# Patient Record
Sex: MALE | Race: WHITE | NOT HISPANIC OR LATINO | Employment: OTHER | URBAN - METROPOLITAN AREA
[De-identification: names, ages, dates, MRNs, and addresses within clinical notes are randomized per-mention and may not be internally consistent; named-entity substitution may affect disease eponyms.]

---

## 2021-11-11 ENCOUNTER — TELEPHONE (OUTPATIENT)
Dept: GERIATRICS | Facility: CLINIC | Age: 76
End: 2021-11-11
Payer: COMMERCIAL

## 2021-11-17 ENCOUNTER — ASSISTED LIVING VISIT (OUTPATIENT)
Dept: GERIATRICS | Facility: CLINIC | Age: 76
End: 2021-11-17
Payer: MEDICARE

## 2021-11-17 VITALS
DIASTOLIC BLOOD PRESSURE: 78 MMHG | BODY MASS INDEX: 28.89 KG/M2 | WEIGHT: 190 LBS | HEART RATE: 77 BPM | SYSTOLIC BLOOD PRESSURE: 121 MMHG | OXYGEN SATURATION: 95 % | TEMPERATURE: 97.8 F | RESPIRATION RATE: 18 BRPM

## 2021-11-17 DIAGNOSIS — F32.A DEPRESSION, UNSPECIFIED DEPRESSION TYPE: ICD-10-CM

## 2021-11-17 DIAGNOSIS — M54.2 CERVICALGIA: ICD-10-CM

## 2021-11-17 DIAGNOSIS — I60.9 SAH (SUBARACHNOID HEMORRHAGE) (H): ICD-10-CM

## 2021-11-17 DIAGNOSIS — Z71.89 ACP (ADVANCE CARE PLANNING): ICD-10-CM

## 2021-11-17 DIAGNOSIS — R29.6 RECURRENT FALLS: Primary | ICD-10-CM

## 2021-11-17 DIAGNOSIS — N40.0 BENIGN PROSTATIC HYPERPLASIA WITHOUT LOWER URINARY TRACT SYMPTOMS: ICD-10-CM

## 2021-11-17 RX ORDER — LATANOPROST 50 UG/ML
1 SOLUTION/ DROPS OPHTHALMIC AT BEDTIME
Status: ON HOLD | COMMUNITY

## 2021-11-17 RX ORDER — BRIMONIDINE TARTRATE AND TIMOLOL MALEATE 2; 5 MG/ML; MG/ML
1 SOLUTION OPHTHALMIC 2 TIMES DAILY
Status: ON HOLD | COMMUNITY
End: 2024-10-02

## 2021-11-17 RX ORDER — POLYETHYLENE GLYCOL 3350 17 G/17G
1 POWDER, FOR SOLUTION ORAL DAILY PRN
Status: ON HOLD | COMMUNITY

## 2021-11-17 RX ORDER — BRIMONIDINE TARTRATE 2 MG/ML
1 SOLUTION/ DROPS OPHTHALMIC 2 TIMES DAILY
Status: ON HOLD | COMMUNITY
End: 2024-10-02

## 2021-11-17 RX ORDER — DULOXETIN HYDROCHLORIDE 30 MG/1
30 CAPSULE, DELAYED RELEASE ORAL 2 TIMES DAILY
Status: ON HOLD | COMMUNITY

## 2021-11-17 RX ORDER — MORPHINE SULFATE 15 MG/1
15 TABLET ORAL 3 TIMES DAILY PRN
COMMUNITY
End: 2022-01-10

## 2021-11-17 RX ORDER — ACETAMINOPHEN 500 MG
500 TABLET ORAL
Status: ON HOLD | COMMUNITY

## 2021-11-17 NOTE — LETTER
2021        RE: Franco Bhakta  [address] [facility] [address]        [place] [facility]  PRIMARY CARE PROVIDER AND CLINIC:  Blaise Rodriguez, MEMO CNP, [address] [facility] [address] / [facility] [address]  Chief Complaint   Patient presents with     Establish Care     [place] Medical Record Number:  [id number]  Place of Service where encounter took place:  [facility] ([facility]) [233]    Franco Bhakta  is a 75 year old  (1945), admitted to the above facility from [facility]..  Admitted to this facility for  rehab, medical management and nursing care.    HPI:    HPI information obtained from: facility chart records, facility staff, patient report, [facility] chart review and family/first contact dtr report.   Brief Summary of Hospital Course:     Falls: h/o falls. Living indep in apt. After spouse , became isolative, started to have increase etoh use per dtr.  Has recurrent falls. ED visit 21 after fall, head trauma. Per CT had small SAH. Thought fall r/t mechanical fall vs syncope.  Has 1st deg. AV block.    SAH: s/p fall as above. Neuro consult.  Asa dc'd.  Started on keppra, depakote for seizure prophylaxis. dc'd to TCU.  21 had alt. Mental status. Head CT stable.  Thought to have metabolic encephalopathy.  Keppra, depakote dc'd.  Has memory loss, confusion at times.    Cervicalgia: s/p cervical fusion, chronic LUE pain, decreased sensation.  Had been followed by pain clinic per dtr. Neurontin, lyrica ineffective. Currently taking cymbalta, prn MS.    BPH: cont on flomax. No recent dysuria    Depression: anxiety at times.  Unhappy with AL placement-wants to live independently.  Has cog. Impairment per testing at TCU. At AL, ACT score 4.4, MMSE 25/30.  Cont. On cymbalta as above.      DMII: cont. On latus.  BG levels variable 100s-200s, > 300 x1 since admit.  Po intake stable. No recent hgba1c      Updates on  Status Since Skilled nursing Admission: po intake stable.  Gait steady with walker    CODE STATUS/ADVANCE DIRECTIVES DISCUSSION:   DNR/DNI code   Patient's living condition: lives in an assisted living facility  ALLERGIES: Atorvastatin, Lisinopril, Oxycodone, and Sertraline  PAST MEDICAL HISTORY:  has a past medical history of Chronic kidney disease, Diabetes (H), Hyperlipidemia, and Hypertension.  PAST SURGICAL HISTORY:   has a past surgical history that includes joint replacement.  FAMILY HISTORY: family history is not on file.  SOCIAL HISTORY:   reports that he has quit smoking. He does not have any smokeless tobacco history on file.    Post Discharge Medication Reconciliation Status: discharge medications reconciled and changed, per note/orders    Current Outpatient Medications   Medication Sig Dispense Refill     acetaminophen (TYLENOL) 500 MG tablet Take 500 mg by mouth 3 times daily       brimonidine (ALPHAGAN) 0.2 % ophthalmic solution Place 1 drop into the right eye 2 times daily       brimonidine-timolol (COMBIGAN) 0.2-0.5 % ophthalmic solution Place 1 drop into the right eye 2 times daily       DULoxetine (CYMBALTA) 30 MG capsule Take 30 mg by mouth 2 times daily       insulin glargine (LANTUS) 100 UNIT/ML PEN Inject 4 Units Subcutaneous At Bedtime        latanoprost (XALATAN) 0.005 % ophthalmic solution Place 1 drop into the right eye At Bedtime       morphine (MSIR) 15 MG IR tablet Take 15 mg by mouth 3 times daily as needed for severe pain       polyethylene glycol (MIRALAX) 17 g packet Take 1 packet by mouth daily as needed for constipation       tamsulosin (FLOMAX) 0.4 MG 24 hr capsule Take 0.4 mg by mouth every morning           ROS:  No chest pain, shortness of breath, fevers, chills, headache, nausea, vomiting, dysuria or bowel abnormalities.  Appetite is  normal.  No pain except LUE.    Vitals:  /78   Pulse 77   Temp 97.8  F (36.6  C)   Resp 18   Wt 86.2 kg (190 lb)   SpO2 95%   BMI  28.89 kg/m    Exam:  GENERAL APPEARANCE:  Alert, in no distress, appears healthy, cooperative  ENT:  Mouth and posterior oropharynx normal, moist mucous membranes, [place], no rhinitis  EYES:  EOM, conjunctivae, lids, pupils and irises normal, PERRL, no drainage  NECK:  No adenopathy,masses or thyromegaly, no carotid bruit, FROM  RESP:  respiratory effort and palpation of chest normal, lungs clear to auscultation , no respiratory distress  CV:  Palpation and auscultation of heart done , regular rate and rhythm, no murmur, rub, or gallop, no edema  ABDOMEN:  normal bowel sounds, soft, nontender, no hepatosplenomegaly or other masses, no guarding or rebound, no bruits  M/S:   gait steady with walker.  muscle strength 5/5 all 4 ext. good ROM shoulders  NEURO:   Cranial nerves 2-12 are normal tested and grossly at patient's baseline, no tremor, speech clear  PSYCH:  insight and judgement impaired, memory impaired , affect and mood normal    Lab/Diagnostic data:  Recent labs in [facility] reviewed by me today.     ASSESSMENT/PLAN:  (R29.6) Recurrent falls  (primary encounter diagnosis)  Comment: s/p fall with SAH. Gait currently stable with walker  Plan: 1. Therapies to start later this week  2. Walker at all times  3. Monitor for changes in gait, LE weakness    (I60.9) SAH (subarachnoid hemorrhage) (H)  Comment: s/p fall. F/u CT showed stable small SAH. Asa discontinued. Alt. Mental status with repeat hosp. Stay.  Metabolic encephalopathy.  Keppra, depakote dc'd    Plan: 1. Cont. Secured memory care unit  2. SW to do further cog. Testing  3. Monitor for changes in neuros    (M54.2) Cervicalgia  Comment: ongoing LUE pain  Plan: 1. Cont. Tylenol  2. Cont. Prn MS  3. dtr does not want further f/u at pain clinic    (N40.0) Benign prostatic hyperplasia without lower urinary tract symptoms  Comment: gen. Stable.  Plan: 1. Cont. flomax  2. Monitor for dysuria, s/s urinary retention    (F32.A) Depression, unspecified depression  type  Comment: mood gen. Stable. occ increased anxiety. Unhappy with AL placement  Plan: 1. Cont. cymbalta  2. Monitor for exit-seeking behaviors  3. Monitor for insomnia    (E11.65,  Z79.4) Insulin dependent type 2 diabetes mellitus, uncontrolled (H)  Comment: variable BG levels  Plan: 1. Cont. basaglar  2. Cont. Bid BG levels  3. Reassess BG levels over next week  4. hgba1c in next 1-2 mos    (Z71.89) ACP (advance care planning)  Comment: DNR/DNI. Comfort focus  Plan: POLST in chart      Electronically signed by:  MEMO Atwood CNP                           Sincerely,        MEMO Atwood CNP

## 2021-11-17 NOTE — PROGRESS NOTES
[place] [facility]  PRIMARY CARE PROVIDER AND CLINIC:  Blaise Rodriguez, APRN CNP, [address] / [place] [address]  Chief Complaint   Patient presents with     Establish Care     [place] Medical Record Number:  [id number]  Place of Service where encounter took place:  [facility] ([facility]) [233]    Franco Bhakta  is a 75 year old  (1945), admitted to the above facility from [facility]..  Admitted to this facility for  rehab, medical management and nursing care.    HPI:    HPI information obtained from: facility chart records, facility staff, patient report, [place] Epic chart review and family/first contact dtr report.   Brief Summary of Hospital Course:     Falls: h/o falls. Living indep in apt. After spouse , became isolative, started to have increase etoh use per dtr.  Has recurrent falls. ED visit 21 after fall, head trauma. Per CT had small SAH. Thought fall r/t mechanical fall vs syncope.  Has 1st deg. AV block.    SAH: s/p fall as above. Neuro consult.  Asa dc'd.  Started on keppra, depakote for seizure prophylaxis. dc'd to TCU.  21 had alt. Mental status. Head CT stable.  Thought to have metabolic encephalopathy.  Keppra, depakote dc'd.  Has memory loss, confusion at times.    Cervicalgia: s/p cervical fusion, chronic LUE pain, decreased sensation.  Had been followed by pain clinic per dtr. Neurontin, lyrica ineffective. Currently taking cymbalta, prn MS.    BPH: cont on flomax. No recent dysuria    Depression: anxiety at times.  Unhappy with AL placement-wants to live independently.  Has cog. Impairment per testing at TCU. At AL, ACT score 4.4, MMSE 25/30.  Cont. On cymbalta as above.      DMII: cont. On latus.  BG levels variable 100s-200s, > 300 x1 since admit.  Po intake stable. No recent hgba1c      Updates on Status Since Skilled nursing Admission: po intake stable.  Gait steady with walker    CODE STATUS/ADVANCE  DIRECTIVES DISCUSSION:   DNR/DNI code   Patient's living condition: lives in an assisted living facility  ALLERGIES: Atorvastatin, Lisinopril, Oxycodone, and Sertraline  PAST MEDICAL HISTORY:  has a past medical history of Chronic kidney disease, Diabetes (H), Hyperlipidemia, and Hypertension.  PAST SURGICAL HISTORY:   has a past surgical history that includes joint replacement.  FAMILY HISTORY: family history is not on file.  SOCIAL HISTORY:   reports that he has quit smoking. He does not have any smokeless tobacco history on file.    Post Discharge Medication Reconciliation Status: discharge medications reconciled and changed, per note/orders    Current Outpatient Medications   Medication Sig Dispense Refill     acetaminophen (TYLENOL) 500 MG tablet Take 500 mg by mouth 3 times daily       brimonidine (ALPHAGAN) 0.2 % ophthalmic solution Place 1 drop into the right eye 2 times daily       brimonidine-timolol (COMBIGAN) 0.2-0.5 % ophthalmic solution Place 1 drop into the right eye 2 times daily       DULoxetine (CYMBALTA) 30 MG capsule Take 30 mg by mouth 2 times daily       insulin glargine (LANTUS) 100 UNIT/ML PEN Inject 4 Units Subcutaneous At Bedtime        latanoprost (XALATAN) 0.005 % ophthalmic solution Place 1 drop into the right eye At Bedtime       morphine (MSIR) 15 MG IR tablet Take 15 mg by mouth 3 times daily as needed for severe pain       polyethylene glycol (MIRALAX) 17 g packet Take 1 packet by mouth daily as needed for constipation       tamsulosin (FLOMAX) 0.4 MG 24 hr capsule Take 0.4 mg by mouth every morning           ROS:  No chest pain, shortness of breath, fevers, chills, headache, nausea, vomiting, dysuria or bowel abnormalities.  Appetite is  normal.  No pain except LUE.    Vitals:  /78   Pulse 77   Temp 97.8  F (36.6  C)   Resp 18   Wt 86.2 kg (190 lb)   SpO2 95%   BMI 28.89 kg/m    Exam:  GENERAL APPEARANCE:  Alert, in no distress, appears healthy, cooperative  ENT:  Mouth  and posterior oropharynx normal, moist mucous membranes, [place], no rhinitis  EYES:  EOM, conjunctivae, lids, pupils and irises normal, PERRL, no drainage  NECK:  No adenopathy,masses or thyromegaly, no carotid bruit, FROM  RESP:  respiratory effort and palpation of chest normal, lungs clear to auscultation , no respiratory distress  CV:  Palpation and auscultation of heart done , regular rate and rhythm, no murmur, rub, or gallop, no edema  ABDOMEN:  normal bowel sounds, soft, nontender, no hepatosplenomegaly or other masses, no guarding or rebound, no bruits  M/S:   gait steady with walker.  muscle strength 5/5 all 4 ext. good ROM shoulders  NEURO:   Cranial nerves 2-12 are normal tested and grossly at patient's baseline, no tremor, speech clear  PSYCH:  insight and judgement impaired, memory impaired , affect and mood normal    Lab/Diagnostic data:  Recent labs in [facility] reviewed by me today.     ASSESSMENT/PLAN:  (R29.6) Recurrent falls  (primary encounter diagnosis)  Comment: s/p fall with SAH. Gait currently stable with walker  Plan: 1. Therapies to start later this week  2. Walker at all times  3. Monitor for changes in gait, LE weakness    (I60.9) SAH (subarachnoid hemorrhage) (H)  Comment: s/p fall. F/u CT showed stable small SAH. Asa discontinued. Alt. Mental status with repeat hosp. Stay.  Metabolic encephalopathy.  Keppra, depakote dc'[place]    Plan: 1. Cont. Secured memory care unit  2. SW to do further cog. Testing  3. Monitor for changes in neuros    (M54.2) Cervicalgia  Comment: ongoing LUE pain  Plan: 1. Cont. Tylenol  2. Cont. Prn MS  3. dtr does not want further f/u at pain clinic    (N40.0) Benign prostatic hyperplasia without lower urinary tract symptoms  Comment: gen. Stable.  Plan: 1. Cont. flomax  2. Monitor for dysuria, s/s urinary retention    (F32.A) Depression, unspecified depression type  Comment: mood gen. Stable. occ increased anxiety. Unhappy with AL placement  Plan: 1. Cont. cymbalta  2.  Monitor for exit-seeking behaviors  3. Monitor for insomnia    (E11.65,  Z79.4) Insulin dependent type 2 diabetes mellitus, uncontrolled (H)  Comment: variable BG levels  Plan: 1. Cont. basaglar  2. Cont. Bid BG levels  3. Reassess BG levels over next week  4. hgba1c in next 1-2 mos    (Z71.89) ACP (advance care planning)  Comment: DNR/DNI. Comfort focus  Plan: POLST in chart      Electronically signed by:  MEMO Atwood CNP

## 2021-11-24 ENCOUNTER — ASSISTED LIVING VISIT (OUTPATIENT)
Dept: GERIATRICS | Facility: CLINIC | Age: 76
End: 2021-11-24
Payer: MEDICARE

## 2021-11-24 VITALS
SYSTOLIC BLOOD PRESSURE: 137 MMHG | OXYGEN SATURATION: 93 % | HEART RATE: 59 BPM | DIASTOLIC BLOOD PRESSURE: 79 MMHG | RESPIRATION RATE: 18 BRPM

## 2021-11-24 DIAGNOSIS — F32.A DEPRESSION, UNSPECIFIED DEPRESSION TYPE: ICD-10-CM

## 2021-11-24 DIAGNOSIS — M79.622 PAIN OF LEFT UPPER ARM: ICD-10-CM

## 2021-11-24 NOTE — PROGRESS NOTES
[place] [facility]  [place] Medical Record Number:  [id number]  [facility] [233[facility]  Chief Complaint   Patient presents with     Diabetes     Depression       HPI:    Franco Bhakta  is a 76 year old (1945), who is being seen today for an episodic care visit.  HPI information obtained from: facility chart records, facility staff, patient report, [facility] chart review and family/first contact dtr report. Today's concern is: DMII, depression, L UE pain. For DMII cont. On lantus.  BG levels variable 100s-upper 200s with few >300.  S/p cervical spine fusion, chronic LUE pain.  Cont. On tylenol, prn MS. Reports pain has been gen stable past couple days. Gait steady with walker.  Had HC RN visit-depression screen, mod. To severe depression.  Reported suicidal ideation, no plans to harm self.  Per family reports, has had ongoing depression since loss of spouse.  Suicidal ideation not new.  Cont. On cymbalta. Po intake stable. Mood gen. Stable per staff.       Past Medical and Surgical History reviewed in Epic today.    MEDICATIONS:    Current Outpatient Medications   Medication Sig Dispense Refill     insulin glargine (LANTUS PEN) 100 UNIT/ML pen Inject 2 Units Subcutaneous every morning And 4 units q pm       acetaminophen (TYLENOL) 500 MG tablet Take 500 mg by mouth 3 times daily       brimonidine (ALPHAGAN) 0.2 % ophthalmic solution Place 1 drop into the right eye 2 times daily       brimonidine-timolol (COMBIGAN) 0.2-0.5 % ophthalmic solution Place 1 drop into the right eye 2 times daily       DULoxetine (CYMBALTA) 30 MG capsule Take 30 mg by mouth 2 times daily       latanoprost (XALATAN) 0.005 % ophthalmic solution Place 1 drop into the right eye At Bedtime       morphine (MSIR) 15 MG IR tablet Take 15 mg by mouth 3 times daily as needed for severe pain       polyethylene glycol (MIRALAX) 17 g packet Take 1 packet by mouth daily  as needed for constipation       tamsulosin (FLOMAX) 0.4 MG 24 hr capsule Take 0.4 mg by mouth every morning           REVIEW OF SYSTEMS:  CONSTITUTIONAL:  body aches, EYES:  mac deg, ENT:  neg, CV:  neg, RESPIRATORY: neg, :  Nocturia, GI:  neg, NEURO:  numbness or tingling , PSYCH: reports occ depression, no current suicial ideation, denies plan to harm self and MUSCULOSKELETAL: muscle aches, soreness    Objective:  /79   Pulse 59   Resp 18   SpO2 93%   Exam:  GENERAL APPEARANCE:  Alert, in no distress, appears healthy  ENT:  Mouth and posterior oropharynx normal, moist mucous membranes, [place]  EYES:  EOM, conjunctivae, lids, pupils and irises normal, PERRL  RESP:  respiratory effort and palpation of chest normal, lungs clear to auscultation , no respiratory distress  CV:  Palpation and auscultation of heart done , regular rate and rhythm, no murmur, rub, or gallop, no edema  ABDOMEN:  normal bowel sounds, soft, nontender, no hepatosplenomegaly or other masses, no guarding or rebound  M/S:   Gait and station normal  muscle strength 5/5 all 4 ext.  NEURO:   Cranial nerves 2-12 are normal tested and grossly at patient's baseline, no tremor  PSYCH:  affect abnormal -flat, mood appears stable.  no apparent anxiety    Labs:     Most Recent 3 CBC's:Recent Labs   Lab Test 09/27/16  1705 09/26/16  1402   WBC 7.5 7.4   HGB 16.0 16.5   MCV 92 93    151     Most Recent 3 BMP's:Recent Labs   Lab Test 09/27/16  1705 09/26/16  1402    138   POTASSIUM 4.3 4.2   CHLORIDE 103 105   CO2 27 27   BUN 22 20   CR 1.09 0.93   ANIONGAP 8 6   VIVIAN 8.8 9.0   * 167*       ASSESSMENT/PLAN:  (E11.65,  Z79.4) Insulin dependent type 2 diabetes mellitus, uncontrolled (H)  (primary encounter diagnosis)  Comment: variable BG levels, elevated at times  Plan: 1. Increase lantus to 4 units qpm, 2 units qam  2. Cont. Tid BG levels  3. hgba1c next week  4. Reassess BG levels over next few days    (F32.A) Depression,  unspecified depression type  Comment: affect flat today. Denies plan to harm self.   Plan: 1. Cont. cymbalta  2. To see SW  3. Left vm with dtr to discuss depression further, cont. Comfort care focus  4. Cont. To invite to activities throughout the day  5. Monitor for changes in mood    (M79.622) Pain of left upper arm  Comment: gen. stable  Plan: 1. Cont. Tylenol  2. Cont. Prn MS  3. Cont. cymbalta as above  4. Monitor for decreased ROM LUE, difficulty with walker    Electronically signed by:  MEMO Atwood CNP

## 2021-11-24 NOTE — LETTER
11/24/2021        RE: Franco Bhakta  [address] [facility] [address]        [place] [facility]  [place] Medical Record Number:  [id number]  [facility] [233[facility]  Chief Complaint   Patient presents with     Diabetes     Depression       HPI:    Franco Bhakta  is a 76 year old (1945), who is being seen today for an episodic care visit.  HPI information obtained from: facility chart records, facility staff, patient report, [facility] chart review and family/first contact dtr report. Today's concern is: DMII, depression, L UE pain. For DMII cont. On lantus.  BG levels variable 100s-upper 200s with few >300.  S/p cervical spine fusion, chronic LUE pain.  Cont. On tylenol, prn MS. Reports pain has been gen stable past couple days. Gait steady with walker.  Had HC RN visit-depression screen, mod. To severe depression.  Reported suicidal ideation, no plans to harm self.  Per family reports, has had ongoing depression since loss of spouse.  Suicidal ideation not new.  Cont. On cymbalta. Po intake stable. Mood gen. Stable per staff.       Past Medical and Surgical History reviewed in Epic today.    MEDICATIONS:    Current Outpatient Medications   Medication Sig Dispense Refill     insulin glargine (LANTUS PEN) 100 UNIT/ML pen Inject 2 Units Subcutaneous every morning And 4 units q pm       acetaminophen (TYLENOL) 500 MG tablet Take 500 mg by mouth 3 times daily       brimonidine (ALPHAGAN) 0.2 % ophthalmic solution Place 1 drop into the right eye 2 times daily       brimonidine-timolol (COMBIGAN) 0.2-0.5 % ophthalmic solution Place 1 drop into the right eye 2 times daily       DULoxetine (CYMBALTA) 30 MG capsule Take 30 mg by mouth 2 times daily       latanoprost (XALATAN) 0.005 % ophthalmic solution Place 1 drop into the right eye At Bedtime       morphine (MSIR) 15 MG IR tablet Take 15 mg by mouth 3 times daily  as needed for severe pain       polyethylene glycol (MIRALAX) 17 g packet Take 1 packet by mouth daily as needed for constipation       tamsulosin (FLOMAX) 0.4 MG 24 hr capsule Take 0.4 mg by mouth every morning           REVIEW OF SYSTEMS:  CONSTITUTIONAL:  body aches, EYES:  mac deg, ENT:  neg, CV:  neg, RESPIRATORY: neg, :  Nocturia, GI:  neg, NEURO:  numbness or tingling , PSYCH: reports occ depression, no current suicial ideation, denies plan to harm self and MUSCULOSKELETAL: muscle aches, soreness    Objective:  /79   Pulse 59   Resp 18   SpO2 93%   Exam:  GENERAL APPEARANCE:  Alert, in no distress, appears healthy  ENT:  Mouth and posterior oropharynx normal, moist mucous membranes, [place]  EYES:  EOM, conjunctivae, lids, pupils and irises normal, PERRL  RESP:  respiratory effort and palpation of chest normal, lungs clear to auscultation , no respiratory distress  CV:  Palpation and auscultation of heart done , regular rate and rhythm, no murmur, rub, or gallop, no edema  ABDOMEN:  normal bowel sounds, soft, nontender, no hepatosplenomegaly or other masses, no guarding or rebound  M/S:   Gait and station normal  muscle strength 5/5 all 4 ext.  NEURO:   Cranial nerves 2-12 are normal tested and grossly at patient's baseline, no tremor  PSYCH:  affect abnormal -flat, mood appears stable.  no apparent anxiety    Labs:     Most Recent 3 CBC's:Recent Labs   Lab Test 09/27/16  1705 09/26/16  1402   WBC 7.5 7.4   HGB 16.0 16.5   MCV 92 93    151     Most Recent 3 BMP's:Recent Labs   Lab Test 09/27/16  1705 09/26/16  1402    138   POTASSIUM 4.3 4.2   CHLORIDE 103 105   CO2 27 27   BUN 22 20   CR 1.09 0.93   ANIONGAP 8 6   VIVIAN 8.8 9.0   * 167*       ASSESSMENT/PLAN:  (E11.65,  Z79.4) Insulin dependent type 2 diabetes mellitus, uncontrolled (H)  (primary encounter diagnosis)  Comment: variable BG levels, elevated at times  Plan: 1. Increase lantus to 4 units qpm, 2 units qam  2. Cont. Tid BG  levels  3. hgba1c next week  4. Reassess BG levels over next few days    (F32.A) Depression, unspecified depression type  Comment: affect flat today. Denies plan to harm self.   Plan: 1. Cont. cymbalta  2. To see SW  3. Left vm with dtr to discuss depression further, cont. Comfort care focus  4. Cont. To invite to activities throughout the day  5. Monitor for changes in mood    (M79.622) Pain of left upper arm  Comment: gen. stable  Plan: 1. Cont. Tylenol  2. Cont. Prn MS  3. Cont. cymbalta as above  4. Monitor for decreased ROM LUE, difficulty with walker    Electronically signed by:  MMEO Atwood CNP                 Sincerely,        MEMO Atwood CNP

## 2021-12-11 ENCOUNTER — LAB REQUISITION (OUTPATIENT)
Dept: LAB | Facility: CLINIC | Age: 76
End: 2021-12-11
Payer: COMMERCIAL

## 2021-12-11 DIAGNOSIS — E11.9 TYPE 2 DIABETES MELLITUS WITHOUT COMPLICATIONS (H): ICD-10-CM

## 2021-12-12 ENCOUNTER — LAB REQUISITION (OUTPATIENT)
Dept: LAB | Facility: CLINIC | Age: 76
End: 2021-12-12
Payer: COMMERCIAL

## 2021-12-12 DIAGNOSIS — E11.9 TYPE 2 DIABETES MELLITUS WITHOUT COMPLICATIONS (H): ICD-10-CM

## 2021-12-13 LAB — HBA1C MFR BLD: 6.7 %

## 2021-12-13 PROCEDURE — P9603 ONE-WAY ALLOW PRORATED MILES: HCPCS | Mod: ORL | Performed by: NURSE PRACTITIONER

## 2021-12-13 PROCEDURE — 83036 HEMOGLOBIN GLYCOSYLATED A1C: CPT | Mod: ORL | Performed by: NURSE PRACTITIONER

## 2021-12-13 PROCEDURE — 36415 COLL VENOUS BLD VENIPUNCTURE: CPT | Mod: ORL | Performed by: NURSE PRACTITIONER

## 2021-12-14 LAB — HBA1C MFR BLD: 6.7 %

## 2021-12-14 PROCEDURE — 83036 HEMOGLOBIN GLYCOSYLATED A1C: CPT | Mod: ORL | Performed by: NURSE PRACTITIONER

## 2021-12-14 PROCEDURE — 36415 COLL VENOUS BLD VENIPUNCTURE: CPT | Mod: ORL | Performed by: NURSE PRACTITIONER

## 2021-12-14 PROCEDURE — P9604 ONE-WAY ALLOW PRORATED TRIP: HCPCS | Mod: ORL | Performed by: NURSE PRACTITIONER

## 2021-12-17 ENCOUNTER — ASSISTED LIVING VISIT (OUTPATIENT)
Dept: GERIATRICS | Facility: CLINIC | Age: 76
End: 2021-12-17
Payer: COMMERCIAL

## 2021-12-17 VITALS
RESPIRATION RATE: 20 BRPM | TEMPERATURE: 97.4 F | DIASTOLIC BLOOD PRESSURE: 78 MMHG | SYSTOLIC BLOOD PRESSURE: 144 MMHG | HEART RATE: 72 BPM | BODY MASS INDEX: 31.07 KG/M2 | WEIGHT: 205 LBS | HEIGHT: 68 IN

## 2021-12-17 DIAGNOSIS — Z53.9 ERRONEOUS ENCOUNTER--DISREGARD: Primary | ICD-10-CM

## 2021-12-17 ASSESSMENT — MIFFLIN-ST. JEOR: SCORE: 1624.37

## 2021-12-17 NOTE — LETTER
12/17/2021        RE: Franco Bhakta  [address] [facility] [address]        Franco Bhakta is a 76 year old male due to be seen at [facility] on December 17, 2021    Unfortunately pt's grandson passed, and pt is out with family today so not seen.   Diana Ledesma MD               Sincerely,        Diana Ledesma MD

## 2021-12-17 NOTE — PROGRESS NOTES
Franco Bhakta is a 76 year old male due to be seen at [facility] on December 17, 2021    Unfortunately pt's grandson passed, and pt is out with family today so not seen.   Diana Ledesma MD

## 2021-12-22 ENCOUNTER — ASSISTED LIVING VISIT (OUTPATIENT)
Dept: GERIATRICS | Facility: CLINIC | Age: 76
End: 2021-12-22
Payer: COMMERCIAL

## 2021-12-22 VITALS
OXYGEN SATURATION: 95 % | DIASTOLIC BLOOD PRESSURE: 85 MMHG | HEART RATE: 64 BPM | RESPIRATION RATE: 16 BRPM | SYSTOLIC BLOOD PRESSURE: 135 MMHG

## 2021-12-22 DIAGNOSIS — R52 PAIN: ICD-10-CM

## 2021-12-22 DIAGNOSIS — K59.00 CONSTIPATION, UNSPECIFIED CONSTIPATION TYPE: ICD-10-CM

## 2021-12-22 RX ORDER — TROLAMINE SALICYLATE 10 G/100G
1 CREAM TOPICAL 2 TIMES DAILY
COMMUNITY
End: 2024-09-30

## 2021-12-22 NOTE — LETTER
12/22/2021        RE: Franco Bhakta  [address] [facility] [id number]        [place] [facility]  [place] Medical Record Number:  [id number]  [facility]  Chief Complaint   Patient presents with     Diabetes       HPI:    Franco Bhakta  is a 76 year old (1945), who is being seen today for an episodic care visit.  HPI information obtained from: facility chart records, facility staff, patient report and [facility] chart review. Today's concern is: DMII, pain, constipation.  For DMII cont. On lantus.  BG levels upper 200s-300s at times.  Per staff, snacks throughout the day, not diet compliant.  Last hgb A1C 6.7% earlier this month. Has chronic LUE pain.  Reports pain of this area stable as is neck pain.  Reports some increased pain of R hip, R knee.  S/p RTKA.  No recent falls reported.  Cont. On tylenol, prn MS.  For constipation cont. On prn miralax.  Has been taking prn MS on regular basis. occ increased constipation.  Prn miralax effective.       Past Medical and Surgical History reviewed in Epic today.    MEDICATIONS:    Current Outpatient Medications   Medication Sig Dispense Refill     insulin glargine (LANTUS PEN) 100 UNIT/ML pen Inject 4 Units Subcutaneous 2 times daily       trolamine salicylate (ASPERCREME) 10 % external cream Apply 1 g topically 2 times daily       acetaminophen (TYLENOL) 500 MG tablet Take 500 mg by mouth 3 times daily       brimonidine (ALPHAGAN) 0.2 % ophthalmic solution Place 1 drop into the right eye 2 times daily       brimonidine-timolol (COMBIGAN) 0.2-0.5 % ophthalmic solution Place 1 drop into the right eye 2 times daily       DULoxetine (CYMBALTA) 30 MG capsule Take 30 mg by mouth 2 times daily       latanoprost (XALATAN) 0.005 % ophthalmic solution Place 1 drop into the right eye At Bedtime       morphine (MSIR) 15 MG IR tablet Take 15 mg by mouth 3 times  daily as needed for severe pain       polyethylene glycol (MIRALAX) 17 g packet Take 1 packet by mouth daily as needed for constipation       tamsulosin (FLOMAX) 0.4 MG 24 hr capsule Take 0.4 mg by mouth every morning           REVIEW OF SYSTEMS:  No chest pain, shortness of breath, fevers, chills, headache, nausea, vomiting, dysuria or bowel abnormalities.  Appetite is  normal.  No pain except R hip, R knee at times, worse in am, improves later in day.    Objective:  /85   Pulse 64   Resp 16   SpO2 95%   Exam:  GENERAL APPEARANCE:  Alert, in no distress, appears healthy  ENT:  Mouth and posterior oropharynx normal, moist mucous membranes, [place]  EYES:  EOM, conjunctivae, lids, pupils and irises normal, PERRL  RESP:  respiratory effort and palpation of chest normal, lungs clear to auscultation , no respiratory distress  CV:  Palpation and auscultation of heart done , regular rate and rhythm, no murmur, rub, or gallop, no edema  ABDOMEN:  normal bowel sounds, soft, nontender, no hepatosplenomegaly or other masses, no guarding or rebound  M/S:   Gait and station normal  muscle strength 5/5 all 4 ext., normal tone  NEURO:   Cranial nerves 2-12 are normal tested and grossly at patient's baseline, no tremor  PSYCH:  insight and judgement impaired, memory impaired , affect and mood normal    Labs:   Recent labs in [facility] reviewed by me today.     ASSESSMENT/PLAN:  (E11.65,  Z79.4) Insulin dependent type 2 diabetes mellitus, uncontrolled (H)  (primary encounter diagnosis)  Comment: elevated BG levels past couple weeks, not diet compliant  Plan: 1. Increase lantus to 4 units bid  2. Bmp next week  3. If bmp stable, may start metformin  4. Cont. Bid BG levels.  hgba1c in next 2-3 mos    (R52) Pain  Comment: chronic LUE. Neck pain stable as is LUE pain. Some increase in R hip, R knee pain  Plan: 1. Cont. Tylenol  2. Cont. Prn MS  3.start aspercreme bid to R hip, knee  4. Reassess over next couple weeks    (K59.00)  Constipation, unspecified constipation type  Comment: gen. Stable. occ increased s/s. Regularly taking prn MS  Plan: 1. Cont. Prn miralax  2. Encourage fluids  3. Maintain act. Level  4. For further increase in s/s, may sched. miralax    Electronically signed by:  MEMO Atwood CNP                 Sincerely,        MEMO Atwood CNP

## 2021-12-22 NOTE — PROGRESS NOTES
[place] [facility]  [place] Medical Record Number:  [id number]  [facility]  Chief Complaint   Patient presents with     Diabetes       HPI:    Franco Bhakta  is a 76 year old (1945), who is being seen today for an episodic care visit.  HPI information obtained from: facility chart records, facility staff, patient report and [facility] chart review. Today's concern is: DMII, pain, constipation.  For DMII cont. On lantus.  BG levels upper 200s-300s at times.  Per staff, snacks throughout the day, not diet compliant.  Last hgb A1C 6.7% earlier this month. Has chronic LUE pain.  Reports pain of this area stable as is neck pain.  Reports some increased pain of R hip, R knee.  S/p RTKA.  No recent falls reported.  Cont. On tylenol, prn MS.  For constipation cont. On prn miralax.  Has been taking prn MS on regular basis. occ increased constipation.  Prn miralax effective.       Past Medical and Surgical History reviewed in Epic today.    MEDICATIONS:    Current Outpatient Medications   Medication Sig Dispense Refill     insulin glargine (LANTUS PEN) 100 UNIT/ML pen Inject 4 Units Subcutaneous 2 times daily       trolamine salicylate (ASPERCREME) 10 % external cream Apply 1 g topically 2 times daily       acetaminophen (TYLENOL) 500 MG tablet Take 500 mg by mouth 3 times daily       brimonidine (ALPHAGAN) 0.2 % ophthalmic solution Place 1 drop into the right eye 2 times daily       brimonidine-timolol (COMBIGAN) 0.2-0.5 % ophthalmic solution Place 1 drop into the right eye 2 times daily       DULoxetine (CYMBALTA) 30 MG capsule Take 30 mg by mouth 2 times daily       latanoprost (XALATAN) 0.005 % ophthalmic solution Place 1 drop into the right eye At Bedtime       morphine (MSIR) 15 MG IR tablet Take 15 mg by mouth 3 times daily as needed for severe pain       polyethylene glycol (MIRALAX) 17 g packet Take 1 packet by mouth  daily as needed for constipation       tamsulosin (FLOMAX) 0.4 MG 24 hr capsule Take 0.4 mg by mouth every morning           REVIEW OF SYSTEMS:  No chest pain, shortness of breath, fevers, chills, headache, nausea, vomiting, dysuria or bowel abnormalities.  Appetite is  normal.  No pain except R hip, R knee at times, worse in am, improves later in day.    Objective:  /85   Pulse 64   Resp 16   SpO2 95%   Exam:  GENERAL APPEARANCE:  Alert, in no distress, appears healthy  ENT:  Mouth and posterior oropharynx normal, moist mucous membranes, [place]  EYES:  EOM, conjunctivae, lids, pupils and irises normal, PERRL  RESP:  respiratory effort and palpation of chest normal, lungs clear to auscultation , no respiratory distress  CV:  Palpation and auscultation of heart done , regular rate and rhythm, no murmur, rub, or gallop, no edema  ABDOMEN:  normal bowel sounds, soft, nontender, no hepatosplenomegaly or other masses, no guarding or rebound  M/S:   Gait and station normal  muscle strength 5/5 all 4 ext., normal tone  NEURO:   Cranial nerves 2-12 are normal tested and grossly at patient's baseline, no tremor  PSYCH:  insight and judgement impaired, memory impaired , affect and mood normal    Labs:   Recent labs in [facility] reviewed by me today.     ASSESSMENT/PLAN:  (E11.65,  Z79.4) Insulin dependent type 2 diabetes mellitus, uncontrolled (H)  (primary encounter diagnosis)  Comment: elevated BG levels past couple weeks, not diet compliant  Plan: 1. Increase lantus to 4 units bid  2. Bmp next week  3. If bmp stable, may start metformin  4. Cont. Bid BG levels.  hgba1c in next 2-3 mos    (R52) Pain  Comment: chronic LUE. Neck pain stable as is LUE pain. Some increase in R hip, R knee pain  Plan: 1. Cont. Tylenol  2. Cont. Prn MS  3.start aspercreme bid to R hip, knee  4. Reassess over next couple weeks    (K59.00) Constipation, unspecified constipation type  Comment: gen. Stable. occ increased s/s. Regularly taking prn  MS  Plan: 1. Cont. Prn miralax  2. Encourage fluids  3. Maintain act. Level  4. For further increase in s/s, may sched. miralax    Electronically signed by:  MEMO Atwood CNP

## 2022-01-09 DIAGNOSIS — R52 PAIN: Primary | ICD-10-CM

## 2022-01-10 RX ORDER — MORPHINE SULFATE 15 MG/1
TABLET ORAL
Qty: 60 TABLET | Refills: 0 | Status: SHIPPED | OUTPATIENT
Start: 2022-01-10 | End: 2022-02-08

## 2022-02-09 ENCOUNTER — DISCHARGE SUMMARY NURSING HOME (OUTPATIENT)
Dept: GERIATRICS | Facility: CLINIC | Age: 77
End: 2022-02-09
Payer: COMMERCIAL

## 2022-02-09 VITALS
BODY MASS INDEX: 31.17 KG/M2 | TEMPERATURE: 98.2 F | HEART RATE: 78 BPM | SYSTOLIC BLOOD PRESSURE: 133 MMHG | WEIGHT: 205 LBS | OXYGEN SATURATION: 96 % | DIASTOLIC BLOOD PRESSURE: 67 MMHG | RESPIRATION RATE: 16 BRPM

## 2022-02-09 DIAGNOSIS — M79.622 PAIN OF LEFT UPPER ARM: ICD-10-CM

## 2022-02-09 DIAGNOSIS — N40.0 BENIGN PROSTATIC HYPERPLASIA WITHOUT LOWER URINARY TRACT SYMPTOMS: ICD-10-CM

## 2022-02-09 NOTE — LETTER
2/9/2022        RE: Franco Bhakta  [address] [facility] [address]        [place] [facility] DISCHARGE SUMMARY  PATIENT'S NAME: Franco Bhakta  YOB: 1945  MEDICAL RECORD NUMBER:  [id number]  Place of Service where encounter took place:  [facility] ([facility]) [233]    PRIMARY CARE PROVIDER AND CLINIC RESPONSIBLE AFTER TRANSFER:   MEMO Atwood CNP, [address] / [place] [address]    Assisted Living: [facility], [place]     Transferring providers: MEMO Atwood CNP, Diana Ledesma MD  Recent Hospitalization/ED: [facility] 09/14/22 through 09/17/22 and  [facility] DATES: 9/10/2021 to 11/11/2021  Date of VALERIE Admission: November 11, 2021  Date of [facility] (anticipated) Discharge: 2/15/22  Discharged to: new assisted living for patient [facility]  Cognitive Scores: ACL: 4.4/5.8  Physical Function: amb. indep.  DME: none2  CODE STATUS/ADVANCE DIRECTIVES DISCUSSION:  DNR / DNI   ALLERGIES: Atorvastatin, Lisinopril, Oxycodone, and Sertraline    DISCHARGE DIAGNOSIS/NURSING FACILITY COURSE:   (E11.65,  Z79.4) Insulin dependent type 2 diabetes mellitus, uncontrolled (H)  (primary encounter diagnosis)  Comment: uncontrolled.  Had recent increased lantus dose. Not diet compliant. BG levels 200s.  Plan: 1. Increase lantus to 6 units bid  2. Cont. Bid BG levels  3. Monitor for changes in po intake    (M79.622) Pain of left upper arm  Comment: chronic, increased s/s at times. Prn MS effective  Plan: 1. Cont. Prn MS  2. Cont. Sched. Tylenol, aspercreme  3. Cont. cymbalta  4. Refer to PT, OT    (N40.0) Benign prostatic hyperplasia without lower urinary tract symptoms  Comment: no recent s/s urinary retention  Plan: 1. Cont. flomax  2. Monitor for changes in UOP, dysuria    Past Medical History:  has a past medical history of Chronic kidney disease, Diabetes (H), Hyperlipidemia, and  Hypertension.    Discharge Medications:    Current Outpatient Medications   Medication Sig Dispense Refill     insulin glargine (LANTUS PEN) 100 UNIT/ML pen Inject 6 Units Subcutaneous 2 times daily       acetaminophen (TYLENOL) 500 MG tablet Take 500 mg by mouth 3 times daily       brimonidine (ALPHAGAN) 0.2 % ophthalmic solution Place 1 drop into the right eye 2 times daily       brimonidine-timolol (COMBIGAN) 0.2-0.5 % ophthalmic solution Place 1 drop into the right eye 2 times daily       DULoxetine (CYMBALTA) 30 MG capsule Take 30 mg by mouth 2 times daily       latanoprost (XALATAN) 0.005 % ophthalmic solution Place 1 drop into the right eye At Bedtime       morphine (MSIR) 15 MG IR tablet 1 TABLET BY MOUTH THREE TIMES DAILY AS NEEDED 21 tablet 0     polyethylene glycol (MIRALAX) 17 g packet Take 1 packet by mouth daily as needed for constipation       tamsulosin (FLOMAX) 0.4 MG 24 hr capsule Take 0.4 mg by mouth every morning       trolamine salicylate (ASPERCREME) 10 % external cream Apply 1 g topically 2 times daily         Medication Changes/Rationale:     Increased lantus due to BG levels 200s    Controlled medications sent with patient:   Medication: MS , to be determined number of tabs given to patient at the time of discharge to take home     ROS:   No chest pain, shortness of breath, fevers, chills, headache, nausea, vomiting, dysuria or bowel abnormalities.  Appetite is  normal.  No pain except occ LUE.    Physical Exam:   Vitals: /67   Pulse 78   Temp 98.2  F (36.8  C)   Resp 16   Wt 93 kg (205 lb)   SpO2 96%   BMI 31.17 kg/m    BMI= Body mass index is 31.17 kg/m .  GENERAL APPEARANCE:  Alert, in no distress, appears healthy  ENT:  Mouth and posterior oropharynx normal, moist mucous membranes, normal hearing acuity  EYES:  EOM, conjunctivae, lids, pupils and irises normal, PERRL  RESP:  respiratory effort and palpation of chest normal, lungs clear to auscultation , no respiratory  distress  CV:  Palpation and auscultation of heart done , regular rate and rhythm, no murmur, rub, or gallop, no edema  ABDOMEN:  normal bowel sounds, soft, nontender, no hepatosplenomegaly or other masses, no guarding or rebound  M/S:   Gait and station normal  muscle strength 5/5 all 4 ext.  NEURO:   Cranial nerves 2-12 are normal tested and grossly at patient's baseline, no tremor  PSYCH:  insight and judgement impaired, memory impaired , affect and mood normal     SNF labs: Recent labs in [facility] reviewed by me today.       DISCHARGE PLAN:    Follow up labs: No labs orders/due    Medical Follow Up:      Follow up with primary care provider in 3 weeks    MTM referral needed and placed by this provider: No    Current [place] scheduled appointments:   none    Discharge Services: PT, OT    Discharge Instructions Verbalized to Patient at Discharge:     None      TOTAL DISCHARGE TIME:   Greater than 30 minutes  Electronically signed by:  MEMO Atwood CNP         Documentation of Face-to-Face and Certification for Home Health Services     Patient: Franco Bhakta   YOB: 1945  MR Number: [id number]  Today's Date: 2/9/2022    I certify that patient: Franco Bhakta is under my care and that I, or a nurse practitioner or physician's assistant working with me, had a face-to-face encounter that meets the physician face-to-face encounter requirements with this patient on: 2/9/2022.    This encounter with the patient was in whole, or in part, for the following medical condition, which is the primary reason for home health care: LUE pain, memory loss.    I certify that, based on my findings, the following services are medically necessary home health services: Occupational Therapy and Physical Therapy.    My clinical findings support the need for the above services because: Occupational Therapy Services are needed to assess and treat cognitive ability and address ADL safety due to impairment in  memory. and Physical Therapy Services are needed to assess and treat the following functional impairments: LUE pain.    Further, I certify that my clinical findings support that this patient is homebound (i.e. absences from home require considerable and taxing effort and are for medical reasons or Scient[employer] services or infrequently or of short duration when for other reasons) because: Requires assistance of another person or specialized equipment to access medical services because patient:  memory loss..    Based on the above findings. I certify that this patient is confined to the home and needs intermittent skilled nursing care, physical therapy and/or speech therapy.  The patient is under my care, and I have initiated the establishment of the plan of care.  This patient will be followed by a physician who will periodically review the plan of care.  Physician/Provider to provide follow up care: Blaise Rodriguez    Responsible Medicare certified [place] Physician: Diana Ledesma  Physician Signature: See electronic signature associated with these discharge orders.  Date: 2/9/2022              Sincerely,        MEMO Atwood CNP

## 2022-02-09 NOTE — PROGRESS NOTES
[place] [facility] DISCHARGE SUMMARY  PATIENT'S NAME: Franco Bhakta  YOB: 1945  MEDICAL RECORD NUMBER:  [id number]  Place of Service where encounter took place:  [facility] ([facility]) [233]    PRIMARY CARE PROVIDER AND CLINIC RESPONSIBLE AFTER TRANSFER:   MEMO Atwood CNP, [address] / [place] [address]    Assisted Living: [facility], [place]     Transferring providers: MEMO Atwood CNP, Diana Ledesma MD  Recent Hospitalization/ED: [facility] 09/14/22 through 09/17/22 and  [facility] DATES: 9/10/2021 to 11/11/2021  Date of VALERIE Admission: November 11, 2021  Date of [facility] (anticipated) Discharge: 2/15/22  Discharged to: new assisted living for patient [facility]  Cognitive Scores: ACL: 4.4/5.8  Physical Function: amb. indep.  DME: none2  CODE STATUS/ADVANCE DIRECTIVES DISCUSSION:  DNR / DNI   ALLERGIES: Atorvastatin, Lisinopril, Oxycodone, and Sertraline    DISCHARGE DIAGNOSIS/NURSING FACILITY COURSE:   (E11.65,  Z79.4) Insulin dependent type 2 diabetes mellitus, uncontrolled (H)  (primary encounter diagnosis)  Comment: uncontrolled.  Had recent increased lantus dose. Not diet compliant. BG levels 200s.  Plan: 1. Increase lantus to 6 units bid  2. Cont. Bid BG levels  3. Monitor for changes in po intake    (M79.622) Pain of left upper arm  Comment: chronic, increased s/s at times. Prn MS effective  Plan: 1. Cont. Prn MS  2. Cont. Sched. Tylenol, aspercreme  3. Cont. cymbalta  4. Refer to PT, OT    (N40.0) Benign prostatic hyperplasia without lower urinary tract symptoms  Comment: no recent s/s urinary retention  Plan: 1. Cont. flomax  2. Monitor for changes in UOP, dysuria    Past Medical History:  has a past medical history of Chronic kidney disease, Diabetes (H), Hyperlipidemia, and Hypertension.    Discharge Medications:    Current Outpatient Medications   Medication Sig Dispense Refill      insulin glargine (LANTUS PEN) 100 UNIT/ML pen Inject 6 Units Subcutaneous 2 times daily       acetaminophen (TYLENOL) 500 MG tablet Take 500 mg by mouth 3 times daily       brimonidine (ALPHAGAN) 0.2 % ophthalmic solution Place 1 drop into the right eye 2 times daily       brimonidine-timolol (COMBIGAN) 0.2-0.5 % ophthalmic solution Place 1 drop into the right eye 2 times daily       DULoxetine (CYMBALTA) 30 MG capsule Take 30 mg by mouth 2 times daily       latanoprost (XALATAN) 0.005 % ophthalmic solution Place 1 drop into the right eye At Bedtime       morphine (MSIR) 15 MG IR tablet 1 TABLET BY MOUTH THREE TIMES DAILY AS NEEDED 21 tablet 0     polyethylene glycol (MIRALAX) 17 g packet Take 1 packet by mouth daily as needed for constipation       tamsulosin (FLOMAX) 0.4 MG 24 hr capsule Take 0.4 mg by mouth every morning       trolamine salicylate (ASPERCREME) 10 % external cream Apply 1 g topically 2 times daily         Medication Changes/Rationale:     Increased lantus due to BG levels 200s    Controlled medications sent with patient:   Medication: MS , to be determined number of tabs given to patient at the time of discharge to take home     ROS:   No chest pain, shortness of breath, fevers, chills, headache, nausea, vomiting, dysuria or bowel abnormalities.  Appetite is  normal.  No pain except occ LUE.    Physical Exam:   Vitals: /67   Pulse 78   Temp 98.2  F (36.8  C)   Resp 16   Wt 93 kg (205 lb)   SpO2 96%   BMI 31.17 kg/m    BMI= Body mass index is 31.17 kg/m .  GENERAL APPEARANCE:  Alert, in no distress, appears healthy  ENT:  Mouth and posterior oropharynx normal, moist mucous membranes, normal hearing acuity  EYES:  EOM, conjunctivae, lids, pupils and irises normal, PERRL  RESP:  respiratory effort and palpation of chest normal, lungs clear to auscultation , no respiratory distress  CV:  Palpation and auscultation of heart done , regular rate and rhythm, no murmur, rub, or gallop, no  edema  ABDOMEN:  normal bowel sounds, soft, nontender, no hepatosplenomegaly or other masses, no guarding or rebound  M/S:   Gait and station normal  muscle strength 5/5 all 4 ext.  NEURO:   Cranial nerves 2-12 are normal tested and grossly at patient's baseline, no tremor  PSYCH:  insight and judgement impaired, memory impaired , affect and mood normal     SNF labs: Recent labs in [facility] reviewed by me today.       DISCHARGE PLAN:    Follow up labs: No labs orders/due    Medical Follow Up:      Follow up with primary care provider in 3 weeks    MT referral needed and placed by this provider: No    Current [place] scheduled appointments:   none    Discharge Services: PT, OT    Discharge Instructions Verbalized to Patient at Discharge:     None      TOTAL DISCHARGE TIME:   Greater than 30 minutes  Electronically signed by:  MEMO Atwood CNP         Documentation of Face-to-Face and Certification for Home Health Services     Patient: Franco Bhakta   YOB: 1945  MR Number: [id number]  Today's Date: 2/9/2022    I certify that patient: Franco Bhakta is under my care and that I, or a nurse practitioner or physician's assistant working with me, had a face-to-face encounter that meets the physician face-to-face encounter requirements with this patient on: 2/9/2022.    This encounter with the patient was in whole, or in part, for the following medical condition, which is the primary reason for home health care: LUE pain, memory loss.    I certify that, based on my findings, the following services are medically necessary home health services: Occupational Therapy and Physical Therapy.    My clinical findings support the need for the above services because: Occupational Therapy Services are needed to assess and treat cognitive ability and address ADL safety due to impairment in memory. and Physical Therapy Services are needed to assess and treat the following functional impairments: LUE  pain.    Further, I certify that my clinical findings support that this patient is homebound (i.e. absences from home require considerable and taxing effort and are for medical reasons or Tenriism services or infrequently or of short duration when for other reasons) because: Requires assistance of another person or specialized equipment to access medical services because patient:  memory loss..    Based on the above findings. I certify that this patient is confined to the home and needs intermittent skilled nursing care, physical therapy and/or speech therapy.  The patient is under my care, and I have initiated the establishment of the plan of care.  This patient will be followed by a physician who will periodically review the plan of care.  Physician/Provider to provide follow up care: Blaise Rodriguez    Responsible Medicare certified [facility]: Diana Ledesma  Physician Signature: See electronic signature associated with these discharge orders.  Date: 2/9/2022

## 2022-02-15 ENCOUNTER — MEDICAL CORRESPONDENCE (OUTPATIENT)
Dept: HEALTH INFORMATION MANAGEMENT | Facility: CLINIC | Age: 77
End: 2022-02-15

## 2023-07-17 ENCOUNTER — MEDICAL CORRESPONDENCE (OUTPATIENT)
Dept: HEALTH INFORMATION MANAGEMENT | Facility: CLINIC | Age: 78
End: 2023-07-17

## 2023-09-28 ENCOUNTER — MEDICAL CORRESPONDENCE (OUTPATIENT)
Dept: HEALTH INFORMATION MANAGEMENT | Facility: CLINIC | Age: 78
End: 2023-09-28
Payer: COMMERCIAL

## 2024-07-08 ENCOUNTER — MEDICAL CORRESPONDENCE (OUTPATIENT)
Dept: HEALTH INFORMATION MANAGEMENT | Facility: CLINIC | Age: 79
End: 2024-07-08

## 2024-09-18 ENCOUNTER — MEDICAL CORRESPONDENCE (OUTPATIENT)
Dept: HEALTH INFORMATION MANAGEMENT | Facility: CLINIC | Age: 79
End: 2024-09-18
Payer: COMMERCIAL

## 2024-09-25 ENCOUNTER — DOCUMENTATION ONLY (OUTPATIENT)
Dept: OTHER | Facility: CLINIC | Age: 79
End: 2024-09-25
Payer: COMMERCIAL

## 2024-09-30 ENCOUNTER — ANESTHESIA EVENT (OUTPATIENT)
Dept: SURGERY | Facility: CLINIC | Age: 79
DRG: 469 | End: 2024-09-30
Payer: COMMERCIAL

## 2024-09-30 RX ORDER — CHOLECALCIFEROL (VITAMIN D3) 50 MCG
1 TABLET ORAL DAILY
COMMUNITY

## 2024-09-30 RX ORDER — NYSTATIN 100000 [USP'U]/G
POWDER TOPICAL 2 TIMES DAILY PRN
COMMUNITY

## 2024-09-30 RX ORDER — ASPIRIN 81 MG/1
81 TABLET ORAL DAILY
Status: ON HOLD | COMMUNITY
End: 2024-10-03

## 2024-09-30 RX ORDER — FOLIC ACID 1 MG/1
1 TABLET ORAL DAILY
COMMUNITY

## 2024-09-30 RX ORDER — FUROSEMIDE 20 MG
10 TABLET ORAL DAILY
COMMUNITY

## 2024-09-30 RX ORDER — ACETAMINOPHEN 325 MG/1
975 TABLET ORAL ONCE
Status: CANCELLED | OUTPATIENT
Start: 2024-09-30 | End: 2024-09-30

## 2024-09-30 RX ORDER — BISACODYL 10 MG
10 SUPPOSITORY, RECTAL RECTAL DAILY PRN
COMMUNITY

## 2024-09-30 RX ORDER — TROLAMINE SALICYLATE 10 G/100G
CREAM TOPICAL PRN
COMMUNITY

## 2024-09-30 RX ORDER — AMOXICILLIN 250 MG
2 CAPSULE ORAL DAILY
COMMUNITY

## 2024-09-30 ASSESSMENT — LIFESTYLE VARIABLES: TOBACCO_USE: 1

## 2024-10-01 ENCOUNTER — APPOINTMENT (OUTPATIENT)
Dept: GENERAL RADIOLOGY | Facility: CLINIC | Age: 79
DRG: 469 | End: 2024-10-01
Attending: ORTHOPAEDIC SURGERY
Payer: COMMERCIAL

## 2024-10-01 ENCOUNTER — ANESTHESIA (OUTPATIENT)
Dept: SURGERY | Facility: CLINIC | Age: 79
DRG: 469 | End: 2024-10-01
Payer: COMMERCIAL

## 2024-10-01 ENCOUNTER — HOSPITAL ENCOUNTER (INPATIENT)
Facility: CLINIC | Age: 79
LOS: 4 days | Discharge: NURSING, REHAB OR LONG-TERM CARE | End: 2024-10-05
Attending: ORTHOPAEDIC SURGERY | Admitting: ORTHOPAEDIC SURGERY
Payer: COMMERCIAL

## 2024-10-01 ENCOUNTER — APPOINTMENT (OUTPATIENT)
Dept: CARDIOLOGY | Facility: CLINIC | Age: 79
DRG: 469 | End: 2024-10-01
Attending: ANESTHESIOLOGY
Payer: COMMERCIAL

## 2024-10-01 DIAGNOSIS — Z96.642 HISTORY OF REVISION OF TOTAL REPLACEMENT OF LEFT HIP JOINT: Primary | ICD-10-CM

## 2024-10-01 PROBLEM — I95.81 HYPOTENSION AFTER PROCEDURE: Status: ACTIVE | Noted: 2024-10-01

## 2024-10-01 LAB
ABO/RH(D): NORMAL
ALBUMIN SERPL BCG-MCNC: 3.8 G/DL (ref 3.5–5.2)
ALP SERPL-CCNC: 72 U/L (ref 40–150)
ALT SERPL W P-5'-P-CCNC: 11 U/L (ref 0–70)
ANION GAP SERPL CALCULATED.3IONS-SCNC: 20 MMOL/L (ref 7–15)
ANTIBODY SCREEN: NEGATIVE
APTT PPP: 31 SECONDS (ref 22–38)
AST SERPL W P-5'-P-CCNC: 19 U/L (ref 0–45)
BILIRUB SERPL-MCNC: 1.5 MG/DL
BUN SERPL-MCNC: 28.1 MG/DL (ref 8–23)
CA-I BLD-MCNC: 4.8 MG/DL (ref 4.4–5.2)
CALCIUM SERPL-MCNC: 9.3 MG/DL (ref 8.8–10.4)
CHLORIDE SERPL-SCNC: 101 MMOL/L (ref 98–107)
CREAT SERPL-MCNC: 1.28 MG/DL (ref 0.67–1.17)
EGFRCR SERPLBLD CKD-EPI 2021: 57 ML/MIN/1.73M2
ERYTHROCYTE [DISTWIDTH] IN BLOOD BY AUTOMATED COUNT: 13.3 % (ref 10–15)
FIBRINOGEN PPP-MCNC: 325 MG/DL (ref 170–510)
FLUAV RNA SPEC QL NAA+PROBE: NEGATIVE
FLUBV RNA RESP QL NAA+PROBE: NEGATIVE
GLUCOSE BLDC GLUCOMTR-MCNC: 128 MG/DL (ref 70–99)
GLUCOSE BLDC GLUCOMTR-MCNC: 168 MG/DL (ref 70–99)
GLUCOSE SERPL-MCNC: 108 MG/DL (ref 70–99)
GLUCOSE SERPL-MCNC: 165 MG/DL (ref 70–99)
HCO3 SERPL-SCNC: 16 MMOL/L (ref 22–29)
HCT VFR BLD AUTO: 36.1 % (ref 40–53)
HGB BLD-MCNC: 11.9 G/DL (ref 13.3–17.7)
HGB BLD-MCNC: 12.2 G/DL (ref 13.3–17.7)
INR PPP: 1.24 (ref 0.85–1.15)
LACTATE SERPL-SCNC: 3.8 MMOL/L (ref 0.7–2)
LVEF ECHO: NORMAL
MAGNESIUM SERPL-MCNC: 2 MG/DL (ref 1.7–2.3)
MCH RBC QN AUTO: 34.1 PG (ref 26.5–33)
MCHC RBC AUTO-ENTMCNC: 33.8 G/DL (ref 31.5–36.5)
MCV RBC AUTO: 101 FL (ref 78–100)
PHOSPHATE SERPL-MCNC: 3.4 MG/DL (ref 2.5–4.5)
PLATELET # BLD AUTO: 165 10E3/UL (ref 150–450)
POTASSIUM SERPL-SCNC: 4.6 MMOL/L (ref 3.4–5.3)
POTASSIUM SERPL-SCNC: 4.7 MMOL/L (ref 3.4–5.3)
PROT SERPL-MCNC: 6.5 G/DL (ref 6.4–8.3)
RBC # BLD AUTO: 3.58 10E6/UL (ref 4.4–5.9)
RSV RNA SPEC NAA+PROBE: NEGATIVE
SARS-COV-2 RNA RESP QL NAA+PROBE: NEGATIVE
SODIUM SERPL-SCNC: 137 MMOL/L (ref 135–145)
SPECIMEN EXPIRATION DATE: NORMAL
TROPONIN T SERPL HS-MCNC: 27 NG/L
WBC # BLD AUTO: 10 10E3/UL (ref 4–11)

## 2024-10-01 PROCEDURE — 82330 ASSAY OF CALCIUM: CPT

## 2024-10-01 PROCEDURE — 258N000003 HC RX IP 258 OP 636: Performed by: NURSE ANESTHETIST, CERTIFIED REGISTERED

## 2024-10-01 PROCEDURE — 85610 PROTHROMBIN TIME: CPT

## 2024-10-01 PROCEDURE — 999N000179 XR SURGERY CARM FLUORO LESS THAN 5 MIN W STILLS

## 2024-10-01 PROCEDURE — 85384 FIBRINOGEN ACTIVITY: CPT

## 2024-10-01 PROCEDURE — 258N000003 HC RX IP 258 OP 636: Performed by: PHYSICIAN ASSISTANT

## 2024-10-01 PROCEDURE — 250N000011 HC RX IP 250 OP 636: Performed by: PHYSICIAN ASSISTANT

## 2024-10-01 PROCEDURE — 250N000009 HC RX 250: Performed by: NURSE ANESTHETIST, CERTIFIED REGISTERED

## 2024-10-01 PROCEDURE — 250N000009 HC RX 250

## 2024-10-01 PROCEDURE — 272N000001 HC OR GENERAL SUPPLY STERILE: Performed by: ORTHOPAEDIC SURGERY

## 2024-10-01 PROCEDURE — 99291 CRITICAL CARE FIRST HOUR: CPT

## 2024-10-01 PROCEDURE — 710N000011 HC RECOVERY PHASE 1, LEVEL 3, PER MIN: Performed by: ORTHOPAEDIC SURGERY

## 2024-10-01 PROCEDURE — 250N000011 HC RX IP 250 OP 636: Performed by: ANESTHESIOLOGY

## 2024-10-01 PROCEDURE — 86901 BLOOD TYPING SEROLOGIC RH(D): CPT

## 2024-10-01 PROCEDURE — 258N000003 HC RX IP 258 OP 636: Performed by: ORTHOPAEDIC SURGERY

## 2024-10-01 PROCEDURE — 83605 ASSAY OF LACTIC ACID: CPT

## 2024-10-01 PROCEDURE — 272N000002 HC OR SUPPLY OTHER OPNP: Performed by: ORTHOPAEDIC SURGERY

## 2024-10-01 PROCEDURE — 84100 ASSAY OF PHOSPHORUS: CPT

## 2024-10-01 PROCEDURE — 84484 ASSAY OF TROPONIN QUANT: CPT | Performed by: ANESTHESIOLOGY

## 2024-10-01 PROCEDURE — 82947 ASSAY GLUCOSE BLOOD QUANT: CPT

## 2024-10-01 PROCEDURE — 258N000003 HC RX IP 258 OP 636: Performed by: ANESTHESIOLOGY

## 2024-10-01 PROCEDURE — 0SRB04Z REPLACEMENT OF LEFT HIP JOINT WITH CERAMIC ON POLYETHYLENE SYNTHETIC SUBSTITUTE, OPEN APPROACH: ICD-10-PCS | Performed by: ORTHOPAEDIC SURGERY

## 2024-10-01 PROCEDURE — 87641 MR-STAPH DNA AMP PROBE: CPT

## 2024-10-01 PROCEDURE — 250N000011 HC RX IP 250 OP 636: Performed by: ORTHOPAEDIC SURGERY

## 2024-10-01 PROCEDURE — 999N000208 ECHOCARDIOGRAM COMPLETE

## 2024-10-01 PROCEDURE — 250N000011 HC RX IP 250 OP 636: Performed by: NURSE ANESTHETIST, CERTIFIED REGISTERED

## 2024-10-01 PROCEDURE — 85730 THROMBOPLASTIN TIME PARTIAL: CPT

## 2024-10-01 PROCEDURE — 36415 COLL VENOUS BLD VENIPUNCTURE: CPT | Performed by: ANESTHESIOLOGY

## 2024-10-01 PROCEDURE — 120N000004 HC R&B MS OVERFLOW

## 2024-10-01 PROCEDURE — 999N000063 XR PELVIS PORT 1/2 VIEWS

## 2024-10-01 PROCEDURE — 0QP704Z REMOVAL OF INTERNAL FIXATION DEVICE FROM LEFT UPPER FEMUR, OPEN APPROACH: ICD-10-PCS | Performed by: ORTHOPAEDIC SURGERY

## 2024-10-01 PROCEDURE — 93005 ELECTROCARDIOGRAM TRACING: CPT

## 2024-10-01 PROCEDURE — 85018 HEMOGLOBIN: CPT | Performed by: ANESTHESIOLOGY

## 2024-10-01 PROCEDURE — 87637 SARSCOV2&INF A&B&RSV AMP PRB: CPT

## 2024-10-01 PROCEDURE — 250N000025 HC SEVOFLURANE, PER MIN: Performed by: ORTHOPAEDIC SURGERY

## 2024-10-01 PROCEDURE — 85018 HEMOGLOBIN: CPT

## 2024-10-01 PROCEDURE — 93306 TTE W/DOPPLER COMPLETE: CPT | Mod: 26 | Performed by: INTERNAL MEDICINE

## 2024-10-01 PROCEDURE — 250N000009 HC RX 250: Performed by: ORTHOPAEDIC SURGERY

## 2024-10-01 PROCEDURE — 82947 ASSAY GLUCOSE BLOOD QUANT: CPT | Performed by: ANESTHESIOLOGY

## 2024-10-01 PROCEDURE — 83735 ASSAY OF MAGNESIUM: CPT

## 2024-10-01 PROCEDURE — 99207 PR NO BILLABLE SERVICE THIS VISIT: CPT | Performed by: HOSPITALIST

## 2024-10-01 PROCEDURE — 93010 ELECTROCARDIOGRAM REPORT: CPT | Performed by: INTERNAL MEDICINE

## 2024-10-01 PROCEDURE — 258N000001 HC RX 258: Performed by: ORTHOPAEDIC SURGERY

## 2024-10-01 PROCEDURE — 999N000141 HC STATISTIC PRE-PROCEDURE NURSING ASSESSMENT: Performed by: ORTHOPAEDIC SURGERY

## 2024-10-01 PROCEDURE — C1713 ANCHOR/SCREW BN/BN,TIS/BN: HCPCS | Performed by: ORTHOPAEDIC SURGERY

## 2024-10-01 PROCEDURE — P9045 ALBUMIN (HUMAN), 5%, 250 ML: HCPCS | Mod: JZ | Performed by: ANESTHESIOLOGY

## 2024-10-01 PROCEDURE — 360N000084 HC SURGERY LEVEL 4 W/ FLUORO, PER MIN: Performed by: ORTHOPAEDIC SURGERY

## 2024-10-01 PROCEDURE — 250N000013 HC RX MED GY IP 250 OP 250 PS 637: Performed by: PHYSICIAN ASSISTANT

## 2024-10-01 PROCEDURE — 86900 BLOOD TYPING SEROLOGIC ABO: CPT

## 2024-10-01 PROCEDURE — 84132 ASSAY OF SERUM POTASSIUM: CPT | Performed by: ANESTHESIOLOGY

## 2024-10-01 PROCEDURE — 370N000017 HC ANESTHESIA TECHNICAL FEE, PER MIN: Performed by: ORTHOPAEDIC SURGERY

## 2024-10-01 PROCEDURE — 250N000009 HC RX 250: Performed by: ANESTHESIOLOGY

## 2024-10-01 PROCEDURE — 255N000002 HC RX 255 OP 636: Performed by: ANESTHESIOLOGY

## 2024-10-01 PROCEDURE — 3E033XZ INTRODUCTION OF VASOPRESSOR INTO PERIPHERAL VEIN, PERCUTANEOUS APPROACH: ICD-10-PCS | Performed by: ORTHOPAEDIC SURGERY

## 2024-10-01 PROCEDURE — C1776 JOINT DEVICE (IMPLANTABLE): HCPCS | Performed by: ORTHOPAEDIC SURGERY

## 2024-10-01 DEVICE — PINNACLE CANCELLOUS BONE SCREW 6.5MM X 25MM
Type: IMPLANTABLE DEVICE | Site: HIP | Status: FUNCTIONAL
Brand: PINNACLE

## 2024-10-01 DEVICE — APEX HOLE ELIMINATOR - PS
Type: IMPLANTABLE DEVICE | Site: HIP | Status: FUNCTIONAL
Brand: APEX

## 2024-10-01 DEVICE — CORAIL HIP SYSTEM CEMENTLESS FEMORAL STEM HA COATED REVISION 12/14 AMT 135 DEGREES STANDARD COLLAR SIZE 14
Type: IMPLANTABLE DEVICE | Site: HIP | Status: FUNCTIONAL
Brand: CORAIL

## 2024-10-01 DEVICE — PINNACLE CANCELLOUS BONE SCREW 6.5MM X 20MM
Type: IMPLANTABLE DEVICE | Site: HIP | Status: FUNCTIONAL
Brand: PINNACLE

## 2024-10-01 DEVICE — PINNACLE HIP SOLUTIONS ALTRX POLYETHYLENE ACETABULAR LINER +4 NEUTRAL 36MM ID 54MM OD
Type: IMPLANTABLE DEVICE | Site: HIP | Status: FUNCTIONAL
Brand: PINNACLE ALTRX

## 2024-10-01 DEVICE — IMP CUP ACE PINNACLE 54MM 1217-22-054: Type: IMPLANTABLE DEVICE | Site: HIP | Status: FUNCTIONAL

## 2024-10-01 DEVICE — BIOLOX DELTA CERAMIC FEMORAL HEAD +5.0 36MM DIA 12/14 TAPER
Type: IMPLANTABLE DEVICE | Site: HIP | Status: FUNCTIONAL
Brand: BIOLOX DELTA

## 2024-10-01 DEVICE — K-WIRE: Type: IMPLANTABLE DEVICE | Site: HIP | Status: FUNCTIONAL

## 2024-10-01 RX ORDER — FENTANYL CITRATE 0.05 MG/ML
50 INJECTION, SOLUTION INTRAMUSCULAR; INTRAVENOUS EVERY 5 MIN PRN
Status: DISCONTINUED | OUTPATIENT
Start: 2024-10-01 | End: 2024-10-01 | Stop reason: HOSPADM

## 2024-10-01 RX ORDER — LABETALOL HYDROCHLORIDE 5 MG/ML
10 INJECTION, SOLUTION INTRAVENOUS
Status: DISCONTINUED | OUTPATIENT
Start: 2024-10-01 | End: 2024-10-01 | Stop reason: HOSPADM

## 2024-10-01 RX ORDER — AMOXICILLIN 250 MG
2 CAPSULE ORAL DAILY
Status: DISCONTINUED | OUTPATIENT
Start: 2024-10-02 | End: 2024-10-01

## 2024-10-01 RX ORDER — HYDROXYZINE HYDROCHLORIDE 10 MG/1
10 TABLET, FILM COATED ORAL EVERY 6 HOURS PRN
Status: DISCONTINUED | OUTPATIENT
Start: 2024-10-01 | End: 2024-10-05 | Stop reason: HOSPADM

## 2024-10-01 RX ORDER — PROCHLORPERAZINE MALEATE 5 MG
5 TABLET ORAL EVERY 6 HOURS PRN
Status: DISCONTINUED | OUTPATIENT
Start: 2024-10-01 | End: 2024-10-01

## 2024-10-01 RX ORDER — DEXTROSE MONOHYDRATE 25 G/50ML
25-50 INJECTION, SOLUTION INTRAVENOUS
Status: DISCONTINUED | OUTPATIENT
Start: 2024-10-01 | End: 2024-10-05 | Stop reason: HOSPADM

## 2024-10-01 RX ORDER — AMOXICILLIN 250 MG
1 CAPSULE ORAL 2 TIMES DAILY
Status: DISCONTINUED | OUTPATIENT
Start: 2024-10-01 | End: 2024-10-05 | Stop reason: HOSPADM

## 2024-10-01 RX ORDER — ONDANSETRON 2 MG/ML
4 INJECTION INTRAMUSCULAR; INTRAVENOUS EVERY 6 HOURS PRN
Status: DISCONTINUED | OUTPATIENT
Start: 2024-10-01 | End: 2024-10-05 | Stop reason: HOSPADM

## 2024-10-01 RX ORDER — ONDANSETRON 2 MG/ML
INJECTION INTRAMUSCULAR; INTRAVENOUS PRN
Status: DISCONTINUED | OUTPATIENT
Start: 2024-10-01 | End: 2024-10-01

## 2024-10-01 RX ORDER — ROPIVACAINE IN 0.9% SOD CHL/PF 0.1 %
.01-.125 PLASTIC BAG, INJECTION (ML) EPIDURAL CONTINUOUS
Status: DISCONTINUED | OUTPATIENT
Start: 2024-10-01 | End: 2024-10-02

## 2024-10-01 RX ORDER — BRIMONIDINE TARTRATE AND TIMOLOL MALEATE 2; 5 MG/ML; MG/ML
1 SOLUTION OPHTHALMIC 2 TIMES DAILY
Status: DISCONTINUED | OUTPATIENT
Start: 2024-10-01 | End: 2024-10-01

## 2024-10-01 RX ORDER — LIDOCAINE HYDROCHLORIDE 20 MG/ML
INJECTION, SOLUTION INFILTRATION; PERINEURAL PRN
Status: DISCONTINUED | OUTPATIENT
Start: 2024-10-01 | End: 2024-10-01

## 2024-10-01 RX ORDER — ACETAMINOPHEN 325 MG/1
650 TABLET ORAL EVERY 4 HOURS PRN
Status: DISCONTINUED | OUTPATIENT
Start: 2024-10-04 | End: 2024-10-05 | Stop reason: HOSPADM

## 2024-10-01 RX ORDER — ONDANSETRON 4 MG/1
4 TABLET, ORALLY DISINTEGRATING ORAL EVERY 6 HOURS PRN
Status: DISCONTINUED | OUTPATIENT
Start: 2024-10-01 | End: 2024-10-01

## 2024-10-01 RX ORDER — ACETAMINOPHEN 325 MG/1
650 TABLET ORAL EVERY 4 HOURS PRN
Status: DISCONTINUED | OUTPATIENT
Start: 2024-10-01 | End: 2024-10-01

## 2024-10-01 RX ORDER — ACETAMINOPHEN 325 MG/10.15ML
650 LIQUID ORAL EVERY 4 HOURS PRN
Status: DISCONTINUED | OUTPATIENT
Start: 2024-10-04 | End: 2024-10-02

## 2024-10-01 RX ORDER — DEXAMETHASONE SODIUM PHOSPHATE 4 MG/ML
4 INJECTION, SOLUTION INTRA-ARTICULAR; INTRALESIONAL; INTRAMUSCULAR; INTRAVENOUS; SOFT TISSUE
Status: DISCONTINUED | OUTPATIENT
Start: 2024-10-01 | End: 2024-10-01 | Stop reason: HOSPADM

## 2024-10-01 RX ORDER — PROPOFOL 10 MG/ML
INJECTION, EMULSION INTRAVENOUS PRN
Status: DISCONTINUED | OUTPATIENT
Start: 2024-10-01 | End: 2024-10-01

## 2024-10-01 RX ORDER — SODIUM CHLORIDE, SODIUM LACTATE, POTASSIUM CHLORIDE, CALCIUM CHLORIDE 600; 310; 30; 20 MG/100ML; MG/100ML; MG/100ML; MG/100ML
INJECTION, SOLUTION INTRAVENOUS CONTINUOUS
Status: DISCONTINUED | OUTPATIENT
Start: 2024-10-01 | End: 2024-10-04

## 2024-10-01 RX ORDER — HYDROMORPHONE HCL IN WATER/PF 6 MG/30 ML
0.4 PATIENT CONTROLLED ANALGESIA SYRINGE INTRAVENOUS EVERY 5 MIN PRN
Status: DISCONTINUED | OUTPATIENT
Start: 2024-10-01 | End: 2024-10-01 | Stop reason: HOSPADM

## 2024-10-01 RX ORDER — NYSTATIN 100000 [USP'U]/G
POWDER TOPICAL 2 TIMES DAILY
Status: DISCONTINUED | OUTPATIENT
Start: 2024-10-01 | End: 2024-10-01

## 2024-10-01 RX ORDER — CHOLECALCIFEROL (VITAMIN D3) 50 MCG
50 TABLET ORAL DAILY
Status: DISCONTINUED | OUTPATIENT
Start: 2024-10-02 | End: 2024-10-05 | Stop reason: HOSPADM

## 2024-10-01 RX ORDER — PHENYLEPHRINE HCL IN 0.9% NACL 50MG/250ML
.1-6 PLASTIC BAG, INJECTION (ML) INTRAVENOUS CONTINUOUS
Status: DISCONTINUED | OUTPATIENT
Start: 2024-10-01 | End: 2024-10-01 | Stop reason: HOSPADM

## 2024-10-01 RX ORDER — TAMSULOSIN HYDROCHLORIDE 0.4 MG/1
0.4 CAPSULE ORAL EVERY MORNING
Status: DISCONTINUED | OUTPATIENT
Start: 2024-10-02 | End: 2024-10-05 | Stop reason: HOSPADM

## 2024-10-01 RX ORDER — POLYETHYLENE GLYCOL 3350 17 G/17G
17 POWDER, FOR SOLUTION ORAL DAILY
Status: DISCONTINUED | OUTPATIENT
Start: 2024-10-02 | End: 2024-10-05 | Stop reason: HOSPADM

## 2024-10-01 RX ORDER — BISACODYL 10 MG
10 SUPPOSITORY, RECTAL RECTAL DAILY PRN
Status: DISCONTINUED | OUTPATIENT
Start: 2024-10-04 | End: 2024-10-01

## 2024-10-01 RX ORDER — AMOXICILLIN 250 MG
1 CAPSULE ORAL 2 TIMES DAILY PRN
Status: DISCONTINUED | OUTPATIENT
Start: 2024-10-01 | End: 2024-10-05 | Stop reason: HOSPADM

## 2024-10-01 RX ORDER — FOLIC ACID 1 MG/1
1 TABLET ORAL DAILY
Status: DISCONTINUED | OUTPATIENT
Start: 2024-10-02 | End: 2024-10-05 | Stop reason: HOSPADM

## 2024-10-01 RX ORDER — LATANOPROST 50 UG/ML
1 SOLUTION/ DROPS OPHTHALMIC AT BEDTIME
Status: DISCONTINUED | OUTPATIENT
Start: 2024-10-01 | End: 2024-10-05 | Stop reason: HOSPADM

## 2024-10-01 RX ORDER — AMOXICILLIN 250 MG
2 CAPSULE ORAL 2 TIMES DAILY PRN
Status: DISCONTINUED | OUTPATIENT
Start: 2024-10-01 | End: 2024-10-05 | Stop reason: HOSPADM

## 2024-10-01 RX ORDER — POLYETHYLENE GLYCOL 3350 17 G/17G
17 POWDER, FOR SOLUTION ORAL DAILY PRN
Status: DISCONTINUED | OUTPATIENT
Start: 2024-10-01 | End: 2024-10-01

## 2024-10-01 RX ORDER — BISACODYL 10 MG
10 SUPPOSITORY, RECTAL RECTAL DAILY PRN
Status: DISCONTINUED | OUTPATIENT
Start: 2024-10-01 | End: 2024-10-01

## 2024-10-01 RX ORDER — LIDOCAINE 40 MG/G
CREAM TOPICAL
Status: DISCONTINUED | OUTPATIENT
Start: 2024-10-01 | End: 2024-10-01 | Stop reason: HOSPADM

## 2024-10-01 RX ORDER — ONDANSETRON 2 MG/ML
4 INJECTION INTRAMUSCULAR; INTRAVENOUS EVERY 6 HOURS PRN
Status: DISCONTINUED | OUTPATIENT
Start: 2024-10-01 | End: 2024-10-01

## 2024-10-01 RX ORDER — CEFAZOLIN SODIUM 1 G/3ML
1 INJECTION, POWDER, FOR SOLUTION INTRAMUSCULAR; INTRAVENOUS EVERY 8 HOURS
Status: COMPLETED | OUTPATIENT
Start: 2024-10-01 | End: 2024-10-02

## 2024-10-01 RX ORDER — SODIUM CHLORIDE, SODIUM LACTATE, POTASSIUM CHLORIDE, CALCIUM CHLORIDE 600; 310; 30; 20 MG/100ML; MG/100ML; MG/100ML; MG/100ML
INJECTION, SOLUTION INTRAVENOUS CONTINUOUS
Status: DISCONTINUED | OUTPATIENT
Start: 2024-10-01 | End: 2024-10-01 | Stop reason: HOSPADM

## 2024-10-01 RX ORDER — FUROSEMIDE 20 MG/1
10 TABLET ORAL DAILY
Status: DISCONTINUED | OUTPATIENT
Start: 2024-10-02 | End: 2024-10-05 | Stop reason: HOSPADM

## 2024-10-01 RX ORDER — NALOXONE HYDROCHLORIDE 0.4 MG/ML
0.4 INJECTION, SOLUTION INTRAMUSCULAR; INTRAVENOUS; SUBCUTANEOUS
Status: DISCONTINUED | OUTPATIENT
Start: 2024-10-01 | End: 2024-10-05 | Stop reason: HOSPADM

## 2024-10-01 RX ORDER — TROLAMINE SALICYLATE 10 G/100G
CREAM TOPICAL DAILY PRN
Status: DISCONTINUED | OUTPATIENT
Start: 2024-10-01 | End: 2024-10-05 | Stop reason: HOSPADM

## 2024-10-01 RX ORDER — HYDROMORPHONE HCL IN WATER/PF 6 MG/30 ML
0.2 PATIENT CONTROLLED ANALGESIA SYRINGE INTRAVENOUS EVERY 5 MIN PRN
Status: DISCONTINUED | OUTPATIENT
Start: 2024-10-01 | End: 2024-10-01 | Stop reason: HOSPADM

## 2024-10-01 RX ORDER — VANCOMYCIN HYDROCHLORIDE 1 G/20ML
INJECTION, POWDER, LYOPHILIZED, FOR SOLUTION INTRAVENOUS
Status: DISCONTINUED
Start: 2024-10-01 | End: 2024-10-01 | Stop reason: HOSPADM

## 2024-10-01 RX ORDER — LIDOCAINE 40 MG/G
CREAM TOPICAL
Status: DISCONTINUED | OUTPATIENT
Start: 2024-10-01 | End: 2024-10-05 | Stop reason: HOSPADM

## 2024-10-01 RX ORDER — ACETAMINOPHEN 325 MG/1
975 TABLET ORAL EVERY 8 HOURS
Status: DISPENSED | OUTPATIENT
Start: 2024-10-01 | End: 2024-10-04

## 2024-10-01 RX ORDER — GLYCOPYRROLATE 0.2 MG/ML
INJECTION, SOLUTION INTRAMUSCULAR; INTRAVENOUS PRN
Status: DISCONTINUED | OUTPATIENT
Start: 2024-10-01 | End: 2024-10-01

## 2024-10-01 RX ORDER — DEXAMETHASONE SODIUM PHOSPHATE 4 MG/ML
INJECTION, SOLUTION INTRA-ARTICULAR; INTRALESIONAL; INTRAMUSCULAR; INTRAVENOUS; SOFT TISSUE PRN
Status: DISCONTINUED | OUTPATIENT
Start: 2024-10-01 | End: 2024-10-01

## 2024-10-01 RX ORDER — FENTANYL CITRATE 50 UG/ML
INJECTION, SOLUTION INTRAMUSCULAR; INTRAVENOUS PRN
Status: DISCONTINUED | OUTPATIENT
Start: 2024-10-01 | End: 2024-10-01

## 2024-10-01 RX ORDER — HYDROMORPHONE HCL IN WATER/PF 6 MG/30 ML
0.2 PATIENT CONTROLLED ANALGESIA SYRINGE INTRAVENOUS
Status: DISCONTINUED | OUTPATIENT
Start: 2024-10-01 | End: 2024-10-05 | Stop reason: HOSPADM

## 2024-10-01 RX ORDER — PROCHLORPERAZINE MALEATE 5 MG
5 TABLET ORAL EVERY 6 HOURS PRN
Status: DISCONTINUED | OUTPATIENT
Start: 2024-10-01 | End: 2024-10-05 | Stop reason: HOSPADM

## 2024-10-01 RX ORDER — PROCHLORPERAZINE 25 MG
12.5 SUPPOSITORY, RECTAL RECTAL EVERY 12 HOURS PRN
Status: DISCONTINUED | OUTPATIENT
Start: 2024-10-01 | End: 2024-10-05 | Stop reason: HOSPADM

## 2024-10-01 RX ORDER — NALOXONE HYDROCHLORIDE 0.4 MG/ML
0.2 INJECTION, SOLUTION INTRAMUSCULAR; INTRAVENOUS; SUBCUTANEOUS
Status: DISCONTINUED | OUTPATIENT
Start: 2024-10-01 | End: 2024-10-05 | Stop reason: HOSPADM

## 2024-10-01 RX ORDER — HYDROMORPHONE HCL IN WATER/PF 6 MG/30 ML
0.4 PATIENT CONTROLLED ANALGESIA SYRINGE INTRAVENOUS
Status: DISCONTINUED | OUTPATIENT
Start: 2024-10-01 | End: 2024-10-05 | Stop reason: HOSPADM

## 2024-10-01 RX ORDER — POLYETHYLENE GLYCOL 3350 17 G/17G
17 POWDER, FOR SOLUTION ORAL DAILY PRN
Status: DISCONTINUED | OUTPATIENT
Start: 2024-10-01 | End: 2024-10-05 | Stop reason: HOSPADM

## 2024-10-01 RX ORDER — NICOTINE POLACRILEX 4 MG
15-30 LOZENGE BUCCAL
Status: DISCONTINUED | OUTPATIENT
Start: 2024-10-01 | End: 2024-10-05 | Stop reason: HOSPADM

## 2024-10-01 RX ORDER — HYDROMORPHONE HYDROCHLORIDE 2 MG/1
4 TABLET ORAL EVERY 4 HOURS PRN
Status: DISCONTINUED | OUTPATIENT
Start: 2024-10-01 | End: 2024-10-05 | Stop reason: HOSPADM

## 2024-10-01 RX ORDER — ONDANSETRON 2 MG/ML
4 INJECTION INTRAMUSCULAR; INTRAVENOUS EVERY 30 MIN PRN
Status: DISCONTINUED | OUTPATIENT
Start: 2024-10-01 | End: 2024-10-01 | Stop reason: HOSPADM

## 2024-10-01 RX ORDER — BRIMONIDINE TARTRATE 2 MG/ML
1 SOLUTION/ DROPS OPHTHALMIC 2 TIMES DAILY
Status: DISCONTINUED | OUTPATIENT
Start: 2024-10-01 | End: 2024-10-01

## 2024-10-01 RX ORDER — ONDANSETRON 4 MG/1
4 TABLET, ORALLY DISINTEGRATING ORAL EVERY 6 HOURS PRN
Status: DISCONTINUED | OUTPATIENT
Start: 2024-10-01 | End: 2024-10-05 | Stop reason: HOSPADM

## 2024-10-01 RX ORDER — FENTANYL CITRATE 50 UG/ML
50 INJECTION, SOLUTION INTRAMUSCULAR; INTRAVENOUS
Status: DISCONTINUED | OUTPATIENT
Start: 2024-10-01 | End: 2024-10-01 | Stop reason: HOSPADM

## 2024-10-01 RX ORDER — NALOXONE HYDROCHLORIDE 0.4 MG/ML
0.1 INJECTION, SOLUTION INTRAMUSCULAR; INTRAVENOUS; SUBCUTANEOUS
Status: DISCONTINUED | OUTPATIENT
Start: 2024-10-01 | End: 2024-10-01 | Stop reason: HOSPADM

## 2024-10-01 RX ORDER — CEFAZOLIN SODIUM/WATER 2 G/20 ML
2 SYRINGE (ML) INTRAVENOUS SEE ADMIN INSTRUCTIONS
Status: DISCONTINUED | OUTPATIENT
Start: 2024-10-01 | End: 2024-10-01 | Stop reason: HOSPADM

## 2024-10-01 RX ORDER — SODIUM CHLORIDE, SODIUM LACTATE, POTASSIUM CHLORIDE, CALCIUM CHLORIDE 600; 310; 30; 20 MG/100ML; MG/100ML; MG/100ML; MG/100ML
INJECTION, SOLUTION INTRAVENOUS CONTINUOUS PRN
Status: DISCONTINUED | OUTPATIENT
Start: 2024-10-01 | End: 2024-10-01

## 2024-10-01 RX ORDER — HYDROMORPHONE HYDROCHLORIDE 2 MG/1
2 TABLET ORAL EVERY 4 HOURS PRN
Status: DISCONTINUED | OUTPATIENT
Start: 2024-10-01 | End: 2024-10-05 | Stop reason: HOSPADM

## 2024-10-01 RX ORDER — ONDANSETRON 4 MG/1
4 TABLET, ORALLY DISINTEGRATING ORAL EVERY 30 MIN PRN
Status: DISCONTINUED | OUTPATIENT
Start: 2024-10-01 | End: 2024-10-01 | Stop reason: HOSPADM

## 2024-10-01 RX ORDER — CEFAZOLIN SODIUM/WATER 2 G/20 ML
2 SYRINGE (ML) INTRAVENOUS
Status: COMPLETED | OUTPATIENT
Start: 2024-10-01 | End: 2024-10-01

## 2024-10-01 RX ORDER — VANCOMYCIN HYDROCHLORIDE 1 G/20ML
INJECTION, POWDER, LYOPHILIZED, FOR SOLUTION INTRAVENOUS PRN
Status: DISCONTINUED | OUTPATIENT
Start: 2024-10-01 | End: 2024-10-01 | Stop reason: HOSPADM

## 2024-10-01 RX ORDER — FENTANYL CITRATE 0.05 MG/ML
25 INJECTION, SOLUTION INTRAMUSCULAR; INTRAVENOUS EVERY 5 MIN PRN
Status: DISCONTINUED | OUTPATIENT
Start: 2024-10-01 | End: 2024-10-01 | Stop reason: HOSPADM

## 2024-10-01 RX ORDER — BISACODYL 10 MG
10 SUPPOSITORY, RECTAL RECTAL DAILY PRN
Status: DISCONTINUED | OUTPATIENT
Start: 2024-10-01 | End: 2024-10-05 | Stop reason: HOSPADM

## 2024-10-01 RX ORDER — DULOXETIN HYDROCHLORIDE 30 MG/1
30 CAPSULE, DELAYED RELEASE ORAL 2 TIMES DAILY
Status: DISCONTINUED | OUTPATIENT
Start: 2024-10-01 | End: 2024-10-05 | Stop reason: HOSPADM

## 2024-10-01 RX ORDER — HYDRALAZINE HYDROCHLORIDE 20 MG/ML
2.5-5 INJECTION INTRAMUSCULAR; INTRAVENOUS EVERY 10 MIN PRN
Status: DISCONTINUED | OUTPATIENT
Start: 2024-10-01 | End: 2024-10-01 | Stop reason: HOSPADM

## 2024-10-01 RX ORDER — MAGNESIUM HYDROXIDE 1200 MG/15ML
LIQUID ORAL PRN
Status: DISCONTINUED | OUTPATIENT
Start: 2024-10-01 | End: 2024-10-01 | Stop reason: HOSPADM

## 2024-10-01 RX ORDER — ASPIRIN 325 MG
325 TABLET, DELAYED RELEASE (ENTERIC COATED) ORAL DAILY
Status: DISCONTINUED | OUTPATIENT
Start: 2024-10-02 | End: 2024-10-05 | Stop reason: HOSPADM

## 2024-10-01 RX ORDER — TRANEXAMIC ACID 650 MG/1
1950 TABLET ORAL ONCE
Status: COMPLETED | OUTPATIENT
Start: 2024-10-01 | End: 2024-10-01

## 2024-10-01 RX ADMIN — HUMAN ALBUMIN MICROSPHERES AND PERFLUTREN 9 ML: 10; .22 INJECTION, SOLUTION INTRAVENOUS at 20:29

## 2024-10-01 RX ADMIN — DEXAMETHASONE SODIUM PHOSPHATE 4 MG: 4 INJECTION, SOLUTION INTRA-ARTICULAR; INTRALESIONAL; INTRAMUSCULAR; INTRAVENOUS; SOFT TISSUE at 14:05

## 2024-10-01 RX ADMIN — PROPOFOL 150 MG: 10 INJECTION, EMULSION INTRAVENOUS at 13:22

## 2024-10-01 RX ADMIN — ALBUMIN HUMAN 12.5 G: 0.05 INJECTION, SOLUTION INTRAVENOUS at 19:36

## 2024-10-01 RX ADMIN — ROCURONIUM BROMIDE 20 MG: 50 INJECTION, SOLUTION INTRAVENOUS at 14:59

## 2024-10-01 RX ADMIN — MICONAZOLE NITRATE: 2 POWDER TOPICAL at 23:35

## 2024-10-01 RX ADMIN — Medication 200 MG: at 17:25

## 2024-10-01 RX ADMIN — ROCURONIUM BROMIDE 10 MG: 50 INJECTION, SOLUTION INTRAVENOUS at 15:39

## 2024-10-01 RX ADMIN — FENTANYL CITRATE 50 MCG: 50 INJECTION INTRAMUSCULAR; INTRAVENOUS at 16:30

## 2024-10-01 RX ADMIN — ROCURONIUM BROMIDE 10 MG: 50 INJECTION, SOLUTION INTRAVENOUS at 16:03

## 2024-10-01 RX ADMIN — SODIUM CHLORIDE, POTASSIUM CHLORIDE, SODIUM LACTATE AND CALCIUM CHLORIDE: 600; 310; 30; 20 INJECTION, SOLUTION INTRAVENOUS at 13:11

## 2024-10-01 RX ADMIN — SODIUM CHLORIDE, POTASSIUM CHLORIDE, SODIUM LACTATE AND CALCIUM CHLORIDE: 600; 310; 30; 20 INJECTION, SOLUTION INTRAVENOUS at 22:14

## 2024-10-01 RX ADMIN — Medication 0.5 MCG/KG/MIN: at 19:40

## 2024-10-01 RX ADMIN — ROCURONIUM BROMIDE 50 MG: 50 INJECTION, SOLUTION INTRAVENOUS at 13:22

## 2024-10-01 RX ADMIN — SODIUM CHLORIDE, POTASSIUM CHLORIDE, SODIUM LACTATE AND CALCIUM CHLORIDE: 600; 310; 30; 20 INJECTION, SOLUTION INTRAVENOUS at 10:59

## 2024-10-01 RX ADMIN — PHENYLEPHRINE HYDROCHLORIDE 100 MCG: 10 INJECTION INTRAVENOUS at 17:06

## 2024-10-01 RX ADMIN — CEFAZOLIN 1 G: 1 INJECTION, POWDER, FOR SOLUTION INTRAMUSCULAR; INTRAVENOUS at 23:34

## 2024-10-01 RX ADMIN — TRANEXAMIC ACID 1950 MG: 650 TABLET ORAL at 10:59

## 2024-10-01 RX ADMIN — PHENYLEPHRINE HYDROCHLORIDE 100 MCG: 10 INJECTION INTRAVENOUS at 16:37

## 2024-10-01 RX ADMIN — GLYCOPYRROLATE 0.2 MG: 0.2 INJECTION, SOLUTION INTRAMUSCULAR; INTRAVENOUS at 14:35

## 2024-10-01 RX ADMIN — PHENYLEPHRINE HYDROCHLORIDE 50 MCG: 10 INJECTION INTRAVENOUS at 17:10

## 2024-10-01 RX ADMIN — NOREPINEPHRINE BITARTRATE 0.03 MCG/KG/MIN: 0.02 INJECTION, SOLUTION INTRAVENOUS at 22:03

## 2024-10-01 RX ADMIN — Medication 2 G: at 13:27

## 2024-10-01 RX ADMIN — SODIUM CHLORIDE, POTASSIUM CHLORIDE, SODIUM LACTATE AND CALCIUM CHLORIDE: 600; 310; 30; 20 INJECTION, SOLUTION INTRAVENOUS at 20:23

## 2024-10-01 RX ADMIN — PHENYLEPHRINE HYDROCHLORIDE 0.2 MCG/KG/MIN: 10 INJECTION INTRAVENOUS at 13:46

## 2024-10-01 RX ADMIN — ROCURONIUM BROMIDE 20 MG: 50 INJECTION, SOLUTION INTRAVENOUS at 14:05

## 2024-10-01 RX ADMIN — PHENYLEPHRINE HYDROCHLORIDE 50 MCG: 10 INJECTION INTRAVENOUS at 16:09

## 2024-10-01 RX ADMIN — LIDOCAINE HYDROCHLORIDE 100 MG: 20 INJECTION, SOLUTION INFILTRATION; PERINEURAL at 13:22

## 2024-10-01 RX ADMIN — FENTANYL CITRATE 100 MCG: 50 INJECTION INTRAMUSCULAR; INTRAVENOUS at 13:22

## 2024-10-01 RX ADMIN — FENTANYL CITRATE 50 MCG: 50 INJECTION INTRAMUSCULAR; INTRAVENOUS at 15:17

## 2024-10-01 RX ADMIN — VASOPRESSIN 1 UNITS/HR: 20 INJECTION, SOLUTION INTRAMUSCULAR; SUBCUTANEOUS at 19:30

## 2024-10-01 RX ADMIN — PHENYLEPHRINE HYDROCHLORIDE 0.4 MCG/KG/MIN: 10 INJECTION INTRAVENOUS at 13:49

## 2024-10-01 RX ADMIN — PROPOFOL 50 MG: 10 INJECTION, EMULSION INTRAVENOUS at 13:27

## 2024-10-01 RX ADMIN — ONDANSETRON 4 MG: 2 INJECTION INTRAMUSCULAR; INTRAVENOUS at 16:52

## 2024-10-01 RX ADMIN — PHENYLEPHRINE HYDROCHLORIDE 100 MCG: 10 INJECTION INTRAVENOUS at 14:59

## 2024-10-01 RX ADMIN — LATANOPROST 1 DROP: 50 SOLUTION OPHTHALMIC at 23:35

## 2024-10-01 ASSESSMENT — ACTIVITIES OF DAILY LIVING (ADL)
ADLS_ACUITY_SCORE: 28
ADLS_ACUITY_SCORE: 26
ADLS_ACUITY_SCORE: 26
ADLS_ACUITY_SCORE: 28
ADLS_ACUITY_SCORE: 26
ADLS_ACUITY_SCORE: 26
ADLS_ACUITY_SCORE: 35
ADLS_ACUITY_SCORE: 26
ADLS_ACUITY_SCORE: 28
ADLS_ACUITY_SCORE: 26
ADLS_ACUITY_SCORE: 35
ADLS_ACUITY_SCORE: 28

## 2024-10-01 NOTE — ANESTHESIA CARE TRANSFER NOTE
Patient: Chris Bhakta    Procedure: Procedure(s):  LEFT TOTAL HIP ARTHROPLASTY  LEFT FEMORAL INTRAMEDULARY NAIL REMOVAL       Diagnosis: Osteoarthritis of left hip [M16.12]  Diagnosis Additional Information: No value filed.    Anesthesia Type:   General     Note:    Oropharynx: oropharynx clear of all foreign objects  Level of Consciousness: awake  Oxygen Supplementation: face mask    Independent Airway: airway patency satisfactory and stable  Dentition: dentition unchanged  Vital Signs Stable: post-procedure vital signs reviewed and stable  Report to RN Given: handoff report given  Patient transferred to: PACU    Handoff Report: Identifed the Patient, Identified the Reponsible Provider, Reviewed the pertinent medical history, Discussed the surgical course, Reviewed Intra-OP anesthesia mangement and issues during anesthesia, Set expectations for post-procedure period and Allowed opportunity for questions and acknowledgement of understanding  Vitals:  Vitals Value Taken Time   BP     Temp     Pulse     Resp     SpO2         Electronically Signed By: MEMO Strauss CRNA  October 1, 2024  5:38 PM

## 2024-10-01 NOTE — ANESTHESIA PROCEDURE NOTES
Fascia iliaca Procedure Note    Pre-Procedure   Staff -        Anesthesiologist:  Ailyn Gonzalez MD       Performed By: anesthesiologist       Location: pre-op       Pre-Anesthestic Checklist: patient identified, IV checked, site marked, risks and benefits discussed, informed consent, monitors and equipment checked, pre-op evaluation, at physician/surgeon's request and post-op pain management  Timeout:       Correct Patient: Yes        Correct Procedure: Yes        Correct Site: Yes        Correct Position: Yes        Correct Laterality: Yes        Site Marked: Yes  Procedure Documentation  Procedure: Fascia iliaca       Patient Position: supine       Patient Prep/Sterile Barriers: sterile gloves, mask       Skin prep: Chloraprep       Local skin infiltrated with mL of 1% lidocaine.        Needle Type: short bevel and insulated       Needle Gauge: 20.        Needle Length (millimeters): 100        Ultrasound guided       1. Ultrasound was used to identify targeted nerve, plexus, vascular marker, or fascial plane and place a needle adjacent to it in real-time.       2. Ultrasound was used to visualize the spread of anesthetic in close proximity to the above referenced structure.       3. A permanent image is entered into the patient's record.    Assessment/Narrative         The placement was negative for: blood aspirated, painful injection and site bleeding       Paresthesias: No.       Test dose of mL at.         Test dose negative, 3 minutes after injection, for signs of intravascular, subdural, or intrathecal injection.       Bolus given via needle..        Secured via.        Insertion/Infusion Method: Single Shot       Complications: none       Injection made incrementally with aspirations every 5 mL.     Comments:  Ultrasound Interpretation, Peripheral Nerve Block    1. Under ultrasound guidance, the needle was inserted and placed in close proximity to the target nerve(s).  2. Ultrasound was also used to  "visualize the spread of the anesthetic in close proximity to the nerve(s) being blocked.  Local anesthetic was administered in incremental doses, with intermittent negative aspiration.    3. The nerve(s) appeared anatomically normal.  4. There were no apparent abnormal pathological findings.  5. A permanent ultrasound image was saved in the patient's record.    Pt tolerated well.    No complications.      The surgeon has given a verbal order transferring care of this patient to me for the performance of a regional analgesia block for post-op pain control. It is requested of me because I am uniquely trained and qualified to perform this block and the surgeon is neither trained nor qualified to perform this procedure.      FOR [facility] ([facility]) ONLY:   Pain Team Contact information: please page the Pain Team Via [employer]. Search \"Pain\". During daytime hours, please page the attending first. At night please page the resident first.      "

## 2024-10-01 NOTE — ANESTHESIA PREPROCEDURE EVALUATION
Anesthesia Pre-Procedure Evaluation    Patient: Chris Bhakta   MRN: [id number] : 1945        Procedure : Procedure(s):  LEFT TOTAL HIP ARTHROPLASTY  LEFT FEMORAL INTRAMEDULARY NAIL REMOVAL          Past Medical History:   Diagnosis Date    Anxiety disorder     BPH (benign prostatic hyperplasia)     CAD (coronary artery disease)     Cardiomyopathy (H)     Cataract     Cervical cord myelomalacia (H)     CHF (congestive heart failure) (H)     Chronic ischemic heart disease     Chronic pain syndrome with opioid dependence     CKD (chronic kidney disease) stage 3, GFR 30-59 ml/min (H)     Closed comminuted intertrochanteric fracture of proximal end of left femur (H)     Closed fracture of neck of left femur (H)     Congenital multiple renal cysts     Cyst of right kidney     Dysarthria     Dyslipidemia     Dysphagia     Dyspnea     Exudative age-related macular degeneration of right eye with inactive choroidal neovascularization (H)     Femur fracture, left 2024    surgery in october    Folic acid deficiency     GERD (gastroesophageal reflux disease)     Hyperglycemia     Hyperlipidemia     Hypertension     Insomnia     Macular degeneration     MDD (major depressive disorder)     Mixed hyperlipidemia     Obesity     RAYMOND (obstructive sleep apnea)     Osteoarthritis     Osteoporosis     Pathological fracture of left femur due to age-related osteoporosis (H)     Primary osteoarthritis of right knee     PVD (peripheral vascular disease) (H)     Renal insufficiency syndrome     SAH (subarachnoid hemorrhage) (H)     Somnolence     Spinal stenosis     TBI (traumatic brain injury) (H)     Toxic encephalopathy     Type 2 diabetes mellitus (H)     Urinary incontinence       Past Surgical History:   Procedure Laterality Date    CARPAL TUNNEL RELEASE      CERVICAL FUSION      CERVICAL SPINE SURGERY  2009    X2    CORONARY ARTERY BYPASS GRAFT  2003    KIDNEY CYST REMOVAL      LUMBAR DECOMPRESSION L3-S1  2010     LUMBAR SPINE SURGERY  2009    PTCA OF LAD (FOR FAILED GRAFT FAILURE)  2004    TOTAL HIP ARTHROPLASTY Right 2020    ULNAR TUNNEL RELEASE  2012      Allergies   Allergen Reactions    Iodinated Contrast Media Shortness Of Breath    Atorvastatin Fatigue    Lisinopril Cough    Oxycodone Confusion    Sertraline Dizziness      Social History     Tobacco Use    Smoking status: Former     Current packs/day: 0.00     Average packs/day: 0.5 packs/day for 53.9 years (26.9 ttl pk-yrs)     Types: Cigarettes     Start date: 1970     Quit date: 2024     Years since quittin.1    Smokeless tobacco: Never   Substance Use Topics    Alcohol use: Not Currently     Comment: rare      Wt Readings from Last 1 Encounters:   22 93 kg (205 lb)        Anesthesia Evaluation   Pt has had prior anesthetic.     No history of anesthetic complications       ROS/MED HX  ENT/Pulmonary:     (+) sleep apnea,               tobacco use, Current use,                       Neurologic: Comment: H/o TBI; Cervical cord myelomalacia; encephalopathy; SAH      Cardiovascular:     (+) Dyslipidemia hypertension- Peripheral Vascular Disease-  CAD -  CABG-date: . stent (to LAD 2/2 graft failure)-      CHF etiology: Ischemic Last EF: 40 date: 2024                             METS/Exercise Tolerance:     Hematologic:       Musculoskeletal:   (+)  arthritis,             GI/Hepatic:     (+) GERD,                   Renal/Genitourinary:     (+) renal disease (Stage 3; Congenital cystic), type: CRI,            Endo:     (+)  type II DM,             Obesity,       Psychiatric/Substance Use:     (+) psychiatric history anxiety and depression  H/O chronic opiod use .     Infectious Disease:       Malignancy:       Other:      (+)  , H/O Chronic Pain,            OUTSIDE LABS:  CBC:   Lab Results   Component Value Date    WBC 7.5 2016    WBC 7.4 2016    HGB 16.0 2016    HGB 16.5 2016    HCT 45.6 2016    HCT 47.3  09/26/2016     09/27/2016     09/26/2016     BMP:   Lab Results   Component Value Date     09/27/2016     09/26/2016    POTASSIUM 4.3 09/27/2016    POTASSIUM 4.2 09/26/2016    CHLORIDE 103 09/27/2016    CHLORIDE 105 09/26/2016    CO2 27 09/27/2016    CO2 27 09/26/2016    BUN 22 09/27/2016    BUN 20 09/26/2016    CR 1.09 09/27/2016    CR 0.93 09/26/2016     (H) 09/27/2016     (H) 09/26/2016     COAGS:   Lab Results   Component Value Date    PTT 31 09/26/2016    INR 1.08 09/26/2016     POC:   Lab Results   Component Value Date     (H) 09/28/2016     HEPATIC:   Lab Results   Component Value Date    ALBUMIN 3.6 09/26/2016    PROTTOTAL 7.5 09/26/2016    ALT 27 09/26/2016    AST 22 09/26/2016    ALKPHOS 92 09/26/2016    BILITOTAL 1.2 09/26/2016     OTHER:   Lab Results   Component Value Date    A1C 6.7 (H) 12/14/2021    VIVIAN 8.8 09/27/2016    LIPASE 66 (L) 09/26/2016       Anesthesia Plan    ASA Status:  4    NPO Status:  NPO Appropriate    Anesthesia Type: General.     - Airway: ETT   Induction: Intravenous.   Maintenance: Balanced.   Techniques and Equipment:     - Lines/Monitors: 2nd IV     Consents    Anesthesia Plan(s) and associated risks, benefits, and realistic alternatives discussed. Questions answered and patient/representative(s) expressed understanding.     - Discussed:     - Discussed with:  Patient            Postoperative Care    Pain management: IV analgesics, Oral pain medications, Multi-modal analgesia, Peripheral nerve block (Single Shot).   PONV prophylaxis: Ondansetron (or other 5HT-3)     Comments:               Carlos Bucio MD    I have reviewed the pertinent notes and labs in the chart from the past 30 days and (re)examined the patient.  Any updates or changes from those notes are reflected in this note.             # Drug Induced Platelet Defect: home medication list includes an antiplatelet medication

## 2024-10-01 NOTE — ANESTHESIA PROCEDURE NOTES
Airway       Patient location during procedure: OR       Procedure Start/Stop Times: 10/1/2024 1:23 PM  Staff -        Anesthesiologist:  Ailyn Gonzalez MD       CRNA: Marie Reyes APRN CRNA       Performed By: CRNA  Consent for Airway        Urgency: elective  Indications and Patient Condition       Indications for airway management: yuval-procedural       Induction type:intravenous       Mask difficulty assessment: 1 - vent by mask    Final Airway Details       Final airway type: endotracheal airway       Successful airway: ETT - single  Endotracheal Airway Details        ETT size (mm): 8.0       Cuffed: yes       Cuff volume (mL): 10       Successful intubation technique: direct laryngoscopy       DL Blade Type: MAC 4       Grade View of Cords: 1       Adjucts: stylet       Position: Right       Measured from: gums/teeth       Secured at (cm): 23       Bite block used: None    Post intubation assessment        Placement verified by: capnometry, equal breath sounds and chest rise        Number of attempts at approach: 1       Number of other approaches attempted: 0       Secured with: tape       Ease of procedure: easy       Dentition: Intact and Unchanged    Medication(s) Administered   Medication Administration Time: 10/1/2024 1:23 PM

## 2024-10-01 NOTE — PROGRESS NOTES
Bp slowly dropping to 61/45. Position change and iv albumen per dr Abraham. Pt remains alert on room air.

## 2024-10-01 NOTE — OP NOTE
[facility]  OPERATIVE NOTE:    Chris Bhakta  [id number]  Date of Surgery: 10/1/2024     PRE-OPERATIVE DIAGNOSIS: left hip subtrochanteric nonunion and osteoarthritis left hip    POST-OPERATIVE DIAGNOSIS:  same    PROCEDURE:  Procedure(s):  LEFT TOTAL HIP ARTHROPLASTY  LEFT FEMORAL INTRAMEDULARY NAIL REMOVAL    SURGEON:  Surgeons and Role:     * Kathryn Nicole MD - Primary     * Les Douglas PA-C - Assisting    DESCRIPTION OF PROCEDURE:    Patient was taken to the operating room and placed on the operating table in supine position.  After adequate induction of a general anesthetic he was transferred to the lateral position held this way with the Keyes hip cristina is held with his left hip up axillary roll was placed care was taken to pad all bony prominences.  He was given 2 g of Ancef for infection prophylaxis given 1 hour prior to incision and performed sterile prep and drape of the left hip and left lower extremity after sterile prep and drape a lateral incision was made and sharply dissected down to the tensor fascia divided in line with its fibers.  Then exposed the proximal femur.  We dissected through the abductor muscles and found the tasneem and this screw going into the head.  Then went to the distal portion of the femur and removed the distal locking screw.  We then removed the screw compression screw going into the head and then at that point we were able to remove the tasneem without difficulty.  At the site of the subsequent nonunion she had very stable fibrous nonunion.  No we proceeded to take down the anterior one third of the abductors tagged and retracted medially.  Did a T capsulotomy preserving the capsule later repair.  They are then able to dislocate the hip upon dislocation of the hip we made an appropriate femoral neck cut and placed retractors anteriorly superiorly and posteriorly to expose the acetabulum.  We then reamed to 55 mm and eventually put in  a 56 mm cup prior to placement of the cup we took reamings from the hip femoral head to uses bone graft once the cup was fully seated 2 screws were applied for additional fixation.  We then put in a manhole cover followed by a +4 neutral liner for a 6 mm cup and a 36 mm head.  Directed our attention to the femur and we reamed and rasped and used the broach eventually we were able to put in a size 14 revision Karaya stem trial.  Obtained x-rays intraoperatively AP pelvis which demonstrated ideal position of the cup and screws would fit on the femoral side with close to restoration of leg length and offset.  We noted that he had a proximately 2 cm leg length discrepancy preoperatively and the plan was to try to improve that to 1 cm.  Then removed the trial components irrigated used the pulse was jet lavage down the actual stem once the stem was fully seated we applied the +536 mm ceramic head.  Reduced the hip found be very stable in full extension X rotation flexion adduction and internal Tatian with restoration of leg length and offset.  We then soaked the hip in a dilute social Betadine for 3 minutes and irrigated using the pulsatile jet lavage.  We used approximately 3 L of antibiotic saline and the pulsatile jet lavage.  Then soaked the wound and with Irrisept for a minute and irrigated again using pulsatile lavage used approximately 3 L antibiotic saline and the pulsatile jet lavage.  Then repaired the capsule using 0 Ethibond sutures.  The abductors repaired used #2 Ethibond.  Fascial layer was closed using 0 Ethibond.  Using oh strata fix.  The deep subcu was closed using 0 Vicryl suture subcu was closed with 2-0 Vicryl skin was closed with a running 3-0 Monocryl subcuticular stitch followed by Prineo dressing distal incision that was used to remove the distal interlock was closed after irrigating using 0 Vicryl for the fascia 2-0 Vicryl subcu and staples for the skin sterile dressings applied should be noted  that prior to closure of the wound    Vancomycin was placed in the wound.  Patient tolerated the procedure there were no intraoperative complication.  The patient at the PAR stable condition.  To be for the patient to weight-bear as tolerated and  Anterior hip precautions.  24 hours Ancef for infection prophylaxis 30 days of aspirin for DVT prophylaxis should also be noted that prior to seeing the patient he was noted to have very poor hygiene and this was all cleaned out by her nursing staff and the physician prior to listening the patient.  And this was discussed with the family after surgery and they had stated that he had difficulty with the nursing staff at the current facility and they are planning on transferring their father once we have a cover from surgery.  This ends dictation dictating physician is Vorlicky.  It should be noted that my assistant Jimmy Douglas was used throughout the entirety of the case he was vital in the safe transfer the patient to and from the operating table traction closure of wound.

## 2024-10-01 NOTE — BRIEF OP NOTE
[facility]    Brief Operative Note    Pre-operative diagnosis: Osteoarthritis of left hip [M16.12]  Post-operative diagnosis Same as pre-operative diagnosis    Procedure: LEFT TOTAL HIP ARTHROPLASTY, Left - Hip  LEFT FEMORAL INTRAMEDULARY NAIL REMOVAL, Left - Hip    Surgeon: Surgeons and Role:     * Kathryn Nicole MD - Primary     * Les Douglas PA-C - Assisting  Anesthesia: General with Block   Estimated Blood Loss: 200 ml    Drains: None  Specimens: * No specimens in log *  Findings:   None.  Complications: None.  Implants:   Implant Name Type Inv. Item Serial No.  Lot No. LRB No. Used Action   IMP WIRE CARIDAD [id number] 1210-6450S - [id number]  IMP WIRE CARIDAD [id number] 1210-6450S  [employer] [id number] Left 1 Used as a Supply   cross screw      Left 1 Explanted   leg screw      Left 1 Explanted   intramedulary nail      Left 1 Explanted   IMP CUP ACE PINNACLE 54MM 1217- - [id number] Total Joint Component/Insert IMP CUP ACE PINNACLE 54MM 1217-  [employer]- [id number] Left 1 Implanted   IMP APEX HOLE ELIMINATOR HIP DEPUY DURALOC 1246- - [id number] Metallic Hardware/Gordon IMP APEX HOLE ELIMINATOR HIP DEPUY DURALOC 1246-  [employer]- [id number] Left 1 Implanted   IMP SCR BONE CAN ACE 6.5X25MM 1217- - [id number] Metallic Hardware/Gordon IMP SCR BONE CAN ACE 6.5X25MM 1217-  [employer]- [id number] Left 1 Implanted   IMP SCR BONE CAN ACE 6.5X20MM 1217- - [id number] Metallic Hardware/Gordon IMP SCR BONE CAN ACE 6.5X20MM 1217-  [employer]- [id number] Left 1 Implanted   IMP SCR BONE CAN ACE 6.5X25MM 1217- - [id number] Metallic Hardware/Gordon IMP SCR BONE CAN ACE 6.5X25MM 1217-  [employer]- [id number] Left 1 Implanted   IMP LINER HIP DEPUY PINNACLE ALTRX [id number] +4 1221- - [id number] Total Joint Component/Insert IMP LINER HIP DEPUY PINNACLE ALTRX [id number] +4  1221-  [employer] [facility] [employer]- [id number] Left 1 Implanted   STEM CORAIL REVISION STD SZ 14 - [id number] Total Joint Component/Insert STEM CORAIL REVISION STD SZ 14  [employer] [facility] [employer]- [id number] Left 1 Implanted   IMP HEAD FEMORAL DEPUY CERAMIC 36MM +5MM [id number] - [id number] Total Joint Component/Insert IMP HEAD FEMORAL DEPUY CERAMIC 36MM +5MM [id number]  [employer] [facility] [employer]- [id number] Left 1 Implanted

## 2024-10-02 ENCOUNTER — APPOINTMENT (OUTPATIENT)
Dept: PHYSICAL THERAPY | Facility: CLINIC | Age: 79
DRG: 469 | End: 2024-10-02
Attending: PHYSICIAN ASSISTANT
Payer: COMMERCIAL

## 2024-10-02 LAB
ALBUMIN SERPL BCG-MCNC: 3.5 G/DL (ref 3.5–5.2)
ALP SERPL-CCNC: 67 U/L (ref 40–150)
ALT SERPL W P-5'-P-CCNC: 9 U/L (ref 0–70)
ANION GAP SERPL CALCULATED.3IONS-SCNC: 11 MMOL/L (ref 7–15)
AST SERPL W P-5'-P-CCNC: 20 U/L (ref 0–45)
BILIRUB DIRECT SERPL-MCNC: 0.25 MG/DL (ref 0–0.3)
BILIRUB SERPL-MCNC: 0.7 MG/DL
BUN SERPL-MCNC: 30.4 MG/DL (ref 8–23)
CALCIUM SERPL-MCNC: 8.9 MG/DL (ref 8.8–10.4)
CHLORIDE SERPL-SCNC: 105 MMOL/L (ref 98–107)
CREAT SERPL-MCNC: 1.37 MG/DL (ref 0.67–1.17)
EGFRCR SERPLBLD CKD-EPI 2021: 52 ML/MIN/1.73M2
ERYTHROCYTE [DISTWIDTH] IN BLOOD BY AUTOMATED COUNT: 13.2 % (ref 10–15)
EST. AVERAGE GLUCOSE BLD GHB EST-MCNC: 131 MG/DL
GLUCOSE BLDC GLUCOMTR-MCNC: 118 MG/DL (ref 70–99)
GLUCOSE BLDC GLUCOMTR-MCNC: 144 MG/DL (ref 70–99)
GLUCOSE BLDC GLUCOMTR-MCNC: 163 MG/DL (ref 70–99)
GLUCOSE BLDC GLUCOMTR-MCNC: 175 MG/DL (ref 70–99)
GLUCOSE BLDC GLUCOMTR-MCNC: 175 MG/DL (ref 70–99)
GLUCOSE BLDC GLUCOMTR-MCNC: 179 MG/DL (ref 70–99)
GLUCOSE BLDC GLUCOMTR-MCNC: 211 MG/DL (ref 70–99)
GLUCOSE SERPL-MCNC: 158 MG/DL (ref 70–99)
HBA1C MFR BLD: 6.2 %
HCO3 SERPL-SCNC: 22 MMOL/L (ref 22–29)
HCT VFR BLD AUTO: 33.4 % (ref 40–53)
HGB BLD-MCNC: 11.4 G/DL (ref 13.3–17.7)
LACTATE SERPL-SCNC: 0.7 MMOL/L (ref 0.7–2)
MCH RBC QN AUTO: 33.9 PG (ref 26.5–33)
MCHC RBC AUTO-ENTMCNC: 34.1 G/DL (ref 31.5–36.5)
MCV RBC AUTO: 99 FL (ref 78–100)
MRSA DNA SPEC QL NAA+PROBE: NEGATIVE
PLATELET # BLD AUTO: 147 10E3/UL (ref 150–450)
POTASSIUM SERPL-SCNC: 5.3 MMOL/L (ref 3.4–5.3)
PROT SERPL-MCNC: 5.9 G/DL (ref 6.4–8.3)
RBC # BLD AUTO: 3.36 10E6/UL (ref 4.4–5.9)
SA TARGET DNA: NEGATIVE
SODIUM SERPL-SCNC: 138 MMOL/L (ref 135–145)
TROPONIN T SERPL HS-MCNC: 129 NG/L
TROPONIN T SERPL HS-MCNC: 157 NG/L
TROPONIN T SERPL HS-MCNC: 166 NG/L
TROPONIN T SERPL HS-MCNC: 169 NG/L
WBC # BLD AUTO: 9.8 10E3/UL (ref 4–11)

## 2024-10-02 PROCEDURE — 85027 COMPLETE CBC AUTOMATED: CPT

## 2024-10-02 PROCEDURE — 97161 PT EVAL LOW COMPLEX 20 MIN: CPT | Mod: GP | Performed by: PHYSICAL THERAPIST

## 2024-10-02 PROCEDURE — 120N000001 HC R&B MED SURG/OB

## 2024-10-02 PROCEDURE — 82947 ASSAY GLUCOSE BLOOD QUANT: CPT

## 2024-10-02 PROCEDURE — 83036 HEMOGLOBIN GLYCOSYLATED A1C: CPT

## 2024-10-02 PROCEDURE — 84484 ASSAY OF TROPONIN QUANT: CPT

## 2024-10-02 PROCEDURE — 36415 COLL VENOUS BLD VENIPUNCTURE: CPT | Performed by: INTERNAL MEDICINE

## 2024-10-02 PROCEDURE — 93005 ELECTROCARDIOGRAM TRACING: CPT

## 2024-10-02 PROCEDURE — 93010 ELECTROCARDIOGRAM REPORT: CPT | Performed by: INTERNAL MEDICINE

## 2024-10-02 PROCEDURE — 250N000013 HC RX MED GY IP 250 OP 250 PS 637: Performed by: PHYSICIAN ASSISTANT

## 2024-10-02 PROCEDURE — 83605 ASSAY OF LACTIC ACID: CPT

## 2024-10-02 PROCEDURE — 250N000013 HC RX MED GY IP 250 OP 250 PS 637: Performed by: INTERNAL MEDICINE

## 2024-10-02 PROCEDURE — 97110 THERAPEUTIC EXERCISES: CPT | Mod: GP | Performed by: PHYSICAL THERAPIST

## 2024-10-02 PROCEDURE — 99233 SBSQ HOSP IP/OBS HIGH 50: CPT | Performed by: INTERNAL MEDICINE

## 2024-10-02 PROCEDURE — 84484 ASSAY OF TROPONIN QUANT: CPT | Performed by: INTERNAL MEDICINE

## 2024-10-02 PROCEDURE — 82248 BILIRUBIN DIRECT: CPT

## 2024-10-02 PROCEDURE — 250N000011 HC RX IP 250 OP 636: Performed by: INTERNAL MEDICINE

## 2024-10-02 PROCEDURE — 93010 ELECTROCARDIOGRAM REPORT: CPT | Mod: XP | Performed by: INTERNAL MEDICINE

## 2024-10-02 PROCEDURE — 250N000011 HC RX IP 250 OP 636: Performed by: PHYSICIAN ASSISTANT

## 2024-10-02 PROCEDURE — 84155 ASSAY OF PROTEIN SERUM: CPT

## 2024-10-02 PROCEDURE — 97530 THERAPEUTIC ACTIVITIES: CPT | Mod: GP | Performed by: PHYSICAL THERAPIST

## 2024-10-02 RX ORDER — DEXTROSE MONOHYDRATE 25 G/50ML
25-50 INJECTION, SOLUTION INTRAVENOUS
Status: DISCONTINUED | OUTPATIENT
Start: 2024-10-02 | End: 2024-10-02

## 2024-10-02 RX ORDER — ALBUTEROL SULFATE 5 MG/ML
2.5 SOLUTION RESPIRATORY (INHALATION)
Status: DISCONTINUED | OUTPATIENT
Start: 2024-10-02 | End: 2024-10-05 | Stop reason: HOSPADM

## 2024-10-02 RX ORDER — ENOXAPARIN SODIUM 100 MG/ML
40 INJECTION SUBCUTANEOUS EVERY 24 HOURS
Status: DISCONTINUED | OUTPATIENT
Start: 2024-10-02 | End: 2024-10-05 | Stop reason: HOSPADM

## 2024-10-02 RX ORDER — NICOTINE POLACRILEX 4 MG
15-30 LOZENGE BUCCAL
Status: DISCONTINUED | OUTPATIENT
Start: 2024-10-02 | End: 2024-10-02

## 2024-10-02 RX ADMIN — MICONAZOLE NITRATE: 2 POWDER TOPICAL at 08:10

## 2024-10-02 RX ADMIN — MICONAZOLE NITRATE: 2 POWDER TOPICAL at 23:40

## 2024-10-02 RX ADMIN — CEFAZOLIN 1 G: 1 INJECTION, POWDER, FOR SOLUTION INTRAMUSCULAR; INTRAVENOUS at 05:50

## 2024-10-02 RX ADMIN — DULOXETINE HYDROCHLORIDE 30 MG: 30 CAPSULE, DELAYED RELEASE ORAL at 08:07

## 2024-10-02 RX ADMIN — HYDROMORPHONE HYDROCHLORIDE 2 MG: 2 TABLET ORAL at 08:19

## 2024-10-02 RX ADMIN — POLYETHYLENE GLYCOL 3350 17 G: 17 POWDER, FOR SOLUTION ORAL at 08:08

## 2024-10-02 RX ADMIN — ENOXAPARIN SODIUM 40 MG: 40 INJECTION SUBCUTANEOUS at 12:00

## 2024-10-02 RX ADMIN — FOLIC ACID 1 MG: 1 TABLET ORAL at 08:08

## 2024-10-02 RX ADMIN — Medication 50 MCG: at 08:08

## 2024-10-02 RX ADMIN — HYDROMORPHONE HYDROCHLORIDE 4 MG: 2 TABLET ORAL at 12:36

## 2024-10-02 RX ADMIN — LATANOPROST 1 DROP: 50 SOLUTION OPHTHALMIC at 23:40

## 2024-10-02 RX ADMIN — SENNOSIDES AND DOCUSATE SODIUM 1 TABLET: 50; 8.6 TABLET ORAL at 21:02

## 2024-10-02 RX ADMIN — HYDROMORPHONE HYDROCHLORIDE 4 MG: 2 TABLET ORAL at 17:07

## 2024-10-02 RX ADMIN — DULOXETINE HYDROCHLORIDE 30 MG: 30 CAPSULE, DELAYED RELEASE ORAL at 21:02

## 2024-10-02 RX ADMIN — SENNOSIDES AND DOCUSATE SODIUM 1 TABLET: 50; 8.6 TABLET ORAL at 08:07

## 2024-10-02 RX ADMIN — ACETAMINOPHEN 975 MG: 325 TABLET ORAL at 21:02

## 2024-10-02 RX ADMIN — ACETAMINOPHEN 975 MG: 325 TABLET ORAL at 13:53

## 2024-10-02 ASSESSMENT — ACTIVITIES OF DAILY LIVING (ADL)
ADLS_ACUITY_SCORE: 41
ADLS_ACUITY_SCORE: 39
ADLS_ACUITY_SCORE: 41
ADLS_ACUITY_SCORE: 39
ADLS_ACUITY_SCORE: 39
ADLS_ACUITY_SCORE: 41
ADLS_ACUITY_SCORE: 39
ADLS_ACUITY_SCORE: 41
ADLS_ACUITY_SCORE: 39
ADLS_ACUITY_SCORE: 41
ADLS_ACUITY_SCORE: 41
ADLS_ACUITY_SCORE: 39

## 2024-10-02 NOTE — PROGRESS NOTES
Pt remains hypotensive yet calm, alert, oriented, on room air. Pt answers all appropriately with c/o left knee pain. Dr Abraham at bedside with ART line started in PACU. 750mg Albumin given. Vasopressin drip, and phenylephrine drip started. A second liter of LR started as well. Stat EKG completed, stat ECHO ordered, stat Hgb to lab returned WNL. Sats continue at 100% on room air. 2l O2 nc for comfort.

## 2024-10-02 NOTE — PROGRESS NOTES
[facility]    Medicine Progress Note - Hospitalist Service    Date of Admission:  10/1/2024    Assessment & Plan     78-year-old male patient with past medical history significant for CAD, CABG in 2009, heart failure, CKD stage III, and hypertension. The patient was admitted to the intensive care unit for postoperative shock after left total hip arthroplasty that was done on elective basis.  Pt was stable and transferred out of ICU on 10/2,hospital ist was given sign out for care transfer to floor.    Left total hip arthroplasty  Post operative hypotension/shock requiring IV pressor and ICU  -post op significant hypotension requiring transfer to ICU and placed on IV pressor therapy  -has been weaned off pressor therapy prior to transfer out of ICU,able to tolerate PO intake and vitals stable.  -post op cares per orthopedic surgery  -PT/OT  -subcutaneous lovenox started for DVT Ppx      Acute anemia:  -Hemoglobin: 11.4  -continue to monitor closely after starting lovenox  -currently stable    EBONI ON CKD:   -creatinine continues to remain elevated  -encourage PO intake  -IVF LR    Type II MI 2 to demand ischemia:  -Troponin elevation persistent,no symptoms at this time  -EKG NG reviewed   -ECHO: No RWMA  -monitor for any recurrence of symptoms,no further work up,less concerning for ACS    DM type 2:  -currently on sliding scale insulin  -monitor [facility]          Diet: Room Service  Regular Diet Adult    DVT Prophylaxis: Enoxaparin (Lovenox) SQ  Diaz Catheter: PRESENT, indication: ICU only: hourly urine output needed for patient care  Lines: None     Cardiac Monitoring: None  Code Status: No CPR- Do NOT Intubate      Clinically Significant Risk Factors             # Anion Gap Metabolic Acidosis: Highest Anion Gap = 20 mmol/L in last 2 days, will monitor and treat as appropriate   # Coagulation Defect: INR = 1.24 (Ref range: 0.85 - 1.15) and/or PTT = 31 Seconds (Ref range: 22 - 38 Seconds),  "will monitor for bleeding     # Heart failure with preserved ejection fraction: EF >50% and home medication list includes sacubitril/valsartan (Entresto)          # Overweight: Estimated body mass index is 26.16 kg/m  as calculated from the following:    Height as of this encounter: 1.72 m (5' 7.72\").    Weight as of this encounter: 77.4 kg (170 lb 9.6 oz)., PRESENT ON ADMISSION            Disposition Plan     Medically Ready for Discharge: Anticipated Tomorrow             Mel Soriano MD  Hospitalist Service  [facility]  Securely message with [employer] (more info)  Text page via [facility] Paging/Directory   ______________________________________________________________________    Interval History   Pt is sitting up in chair,denies chest pain/SOB/N/V,able to tolerate PO Intake.no family at bedside.    Physical Exam   Vital Signs: Temp: 99.1  F (37.3  C) Temp src: Axillary BP: 111/52 Pulse: 77   Resp: 13 SpO2: 97 % O2 Device: None (Room air) Oxygen Delivery: 2 LPM  Weight: 170 lbs 9.6 oz    Constitutional: Awake, alert, cooperative, no apparent distress  Respiratory: Clear to auscultation bilaterally, no crackles or wheezing  Cardiovascular: Regular rate and rhythm, normal S1 and S2, and no murmur noted  GI: Normal bowel sounds, soft, non-distended, non-tender  Skin/Integumen: No rashes, no cyanosis, no edema      Medical Decision Making         "

## 2024-10-02 NOTE — PROGRESS NOTES
10/02/24 1000   Appointment Info   Signing Clinician's Name / Credentials (PT) Candice Fontenot PT, DPT   Living Environment   People in Home alone   Current Living Arrangements assisted living   Home Accessibility no concerns   Living Environment Comments Pt reports assist with medications only.   Self-Care   Activity/Exercise/Self-Care Comment Stand pivot to wc/pivot squat at baseline. Reports independence with bathing and dressing. MR via care everywhere Home Care PT note from 1/2024 indicated the pt is not safe to ambulate without physical assist; therefore, recommended use of the wc for locomotion.   General Information   Onset of Illness/Injury or Date of Surgery 10/02/24   Referring Physician Mel Soriano MD   Patient/Family Therapy Goals Statement (PT) Plans to go to [facility]   Pertinent History of Current Problem (include personal factors and/or comorbidities that impact the POC) 79 yo male s/p fall back in July of 2023; had a hip nailing at that time. Now presents for a YELENA by Dr. Nicole on 10/1/2024. Pt has been in/out of TCU post original fall. States he plans to go back to TCU at disch.   Existing Precautions/Restrictions fall;other (see comments)  (Monitor BP close as pt with soft pressures.)   General Observations Supine, abductor pillow in place. ? if needed as orders are for no active abduction?   Cognition   Affect/Mental Status (Cognition) WNL   Orientation Status (Cognition) oriented x 4   Follows Commands (Cognition) WFL  (Needs cues to wait at times for instruction, then repeat of cues.)   Safety Deficit (Cognition) minimal deficit;impulsivity;safety precautions awareness   Pain Assessment   Patient Currently in Pain Yes, see Vital Sign flowsheet  (L hip discomfort with seated flexion/sitting position intially.)   Integumentary/Edema   Integumentary/Edema Comments slight surgical edema at the LLE   Posture    Posture Forward head position;Protracted shoulders   Posture Comments Pt  with multiple spinal surgeries in the past.   Range of Motion (ROM)   ROM Comment L hip supine able to flex the hip via heel side ~ 45 degrees within pain free range. Able to lift the LLE up from the bed with Min A to  remove pillows.   Strength (Manual Muscle Testing)   Strength Comments Min A SLR on the L, independent on the R.   Bed Mobility   Comment, (Bed Mobility) Sup>sit Max A, preference to attempt to roll ;however, pt then rotates the R hip and is in pain-edu on long sit via pivot. See Rx   Transfers   Comment, (Transfers) Squat pivot bed>wc, Min A x 2. One for balance, second assist to stabilize wc (brakes engaged, but chair still slides a bit on the tile floor).   Gait/Stairs (Locomotion)   Comment, (Gait/Stairs) Pt is non ambulatory, uses his wc-proplesl with feet and hands, demonstrates abitliy in room 15' plus turning for optimal positioning in room.   Balance   Balance Comments sitting balance inially Mod A to maintain secondary to pain on the L hip.   Sensory Examination   Sensory Perception Comments Some decreased sensation tolight touch, states he can still feel the touch, but limited.   Clinical Impression   Criteria for Skilled Therapeutic Intervention Yes, treatment indicated   PT Diagnosis (PT) Impaired transfers   Influenced by the following impairments Pain/discomfort, post op weakness and fatigue, no active abduction, decreased balance and activity tolerance.   Functional limitations due to impairments Decreased functional independence with mobility   Clinical Presentation (PT Evaluation Complexity) stable   Clinical Presentation Rationale see MR   Clinical Decision Making (Complexity) low complexity   Planned Therapy Interventions (PT) balance training;bed mobility training;gait training;home exercise program;patient/family education;ROM (range of motion);stair training;strengthening;stretching;transfer training;progressive activity/exercise;risk factor education;home program  "guidelines;wheelchair management/propulsion training   Risk & Benefits of therapy have been explained evaluation/treatment results reviewed;care plan/treatment goals reviewed;risks/benefits reviewed;current/potential barriers reviewed;participants voiced agreement with care plan;participants included;patient   PT Total Evaluation Time   PT Eval, Low Complexity Minutes (30692) 10   Physical Therapy Goals   PT Frequency Daily   PT Predicted Duration/Target Date for Goal Attainment 10/08/24   PT Goals Bed Mobility;Transfers;Wheelchair Mobility;PT Goal 1   PT: Bed Mobility Minimal assist;Supine to/from sit;Within precautions  (YELENA prec, no abduction on the L)   PT: Wheelchair Mobility Greater than 500 feet;Caregiver SBA;manual wheelchair   PT: Goal 1 Pt will complete squat pivot transfers bed<>chair consistently with CGA or less, maintaining L YELENA precautions.   Interventions   Interventions Quick Adds Therapeutic Activity;Therapeutic Procedure   Therapeutic Procedure/Exercise   Ther. Procedure: strength, endurance, ROM, flexibillity Minutes (35867) 25   Treatment Detail/Skilled Intervention Supine LE AROM and AAROM for cirulatoin and reduced stiffness: heel slides, APs x 10-15 reaps, heel slides with use of packaged wipes to reduce friction and mprove ROM -Min A. Max verbal cues for breathing and slowing pace. Enocuraged to \"pull\" just a little further with each rep to imrpove hip flexion ROM on the L. Setaed in wc pt compelted heel/toe lifts LaQs and mini march x 10 repse each. Enocuraged use of AROM outside of reahb.   Therapeutic Activity   Treatment Detail/Skilled Intervention Dangled EOB x 6 min, skilled VS assessment pre,during and post activity. Pt with initally soft, then high pressures. Pt able to lead PT to set up the wc as he would normally transfer, places wc to the L of his body, stands and pivots to the ian-pt does adduct the hip a bit twiht the transfer-Min A x 2. Up in wc pt able to manage wc and " repark for improved set up. Able to wash face, comb hair and brush teeth up in the wc with set up and good sequencing. Up in wc, all needs in reach, alarm on and BP stable at PT exit. RN updated via Door 6 secondary to being busy with another pt. Skilled monitoring of BP provided pre,during and post time up for optimal safety as pt tx to ICU for BP management post op.   PT Discharge Planning   PT Plan Consecutive stand/squat pivots for technique and strength benefits.   PT Discharge Recommendation (DC Rec) Transitional Care Facility   PT Rationale for DC Rec Pt reports he can not take care of himself in his current state. States he plans to go to [facility]. Reports he has been there before and would like to go back. Pt will benefit from skilled rehab services in a TCU setting for optimal safety and indepenence with mobility prior to rturn to his custodial,   PT Brief overview of current status Min A x 2 squat pivot bed<>wc. Goals of therapy will be to address safe mobility and make recs for d/c to next level of care. Pt and RN will continue to follow all falls risk precautions as documented by RN staff while hospitalized.   Total Session Time   Timed Code Treatment Minutes 25   Total Session Time (sum of timed and untimed services) 35

## 2024-10-02 NOTE — PROGRESS NOTES
Hospitalist consult acknowledged.  Came through as routine consult.  Per chart review-patient is in the ICU on pressors for hypotension postop.  Hospitalist service will not see the patient while on pressors.  To be managed by intensivist.  Please consult hospitalist service when ready to transfer out of ICU.

## 2024-10-02 NOTE — H&P
[facility] ICU H&P  10/1/2024    Date of Hospital Admission: 10/1/2024  Date of ICU Admission: 10/1/2024  Reason for Critical Care Admission: Post-operative hypotension  Date of Service (when I saw the patient): 10/1/2024    ASSESSMENT: Chris Bhakta is a 78 year old male with PMH significant for CAD, DM-II, HTN, CABG in 2009, cervical spinal myelomalacia, CKD-III who was admitted on 10/1/2024 after undergoing a planned left hip arthroplasty and prior hardware removal for a pathological fracture of his left femur s/t osteoporosis. The patient was admitted to the ICU from the PACU after post-operative hypotension that was refractory to fluid resuscitation and requiring the initiation of vasopressors.      HISTORY PRESENTING ILLNESS:   The patient was admitted from the PACU and arrived on phenylephrine and vasopressin and transitioned off those two infusion to continuous NorEpi infusion. During the patient's assessment the patient stated that he would like to be a DNR/DNI.      CHANGES and MAJOR THINGS TODAY:   - Admitted to the ICU from PACU  - Labs and tests as below  - Lactic acid and troponin levels in AM  - EKG in AM      PLAN:    Neuro:  # Acute post-operative pain  # Chronic pain disorder  # Hx Cervical spinal myelomalacia  - APAP 975 mg q6 hrs and 650 mg q4 hrs PRN  - Hydromorphone 0.2-0.4 mg IV q2 hrs PRN  - Hydromorphone 2-4 mg PO q4 hrs PRN  - Continued PTA Duloxetine 30 mg BID     # Hx Subarachnoid hemorrhage in 2021 s/t mechanical fall  # Hx Traumatic brain injury, s/t SAH  - No acute monitoring required during this hospitalization      Pulmonary:  # Mild hypoxic respiratory failure  - Oxygen saturation goal greater than 92% and titrating FiO2 to meet saturation goal  - Albuterol neb q2 hrs PRN      Cardiovascular:  # Acute post-operative hypotension, likely s/t post-operative vasoplegia  # CAD  # HFpEF  # Hx CABG in 2009  # HTN  # HLD   Patient hypotensive while recovering in the PACU and given a  total of 750 ml of albumin, 3L NS bolus, and had an arterial line placed by anesthesia provider.  - MAP goal greater than 65 mmHg  - Continuous NorEpi infusion to maintain MAP goal  - HOLDING PTA  mg qDay until ok with Orthopedic surgery  - HOLDING PTA Jardiance 10 mg qDay for hypotension  - HOLDING PTA Furosemide 10 mg qDay for hypotension  - HOLDING PTA Entresto 24-26 mg BID for hypotension  - Stat echocardiogram in PACU showing:  - LV systolic function is normal, the EF is 55-60%, and no regional wall motion abnormalities noted. Exam was noted to be a technically difficult study      GI/Nutrition:  # Elevated bilirubin level, unclear etiology  Patient's hepatic panel otherwise unremarkable with ALT, AST, and alk phos WNL.   - Zofran 4 mg q6 hrs PRN  - Compazine 4 mg q6 hrs PRN   - Senna 1 tab BID  - Miralax qDay  - Dulcolax qDay PRN  - CMP on admission to ICU  - Repeat hepatic panel in AM of 10/02    Diet: ADAT to regular diet      Renal/Fluids/Electrolytes:  # Lactic acidosis, likely s/t hypoperfusion  # Anion gap metabolic acidosis  # CDK, stage III  # Hx BPH  Pre-operative creatinine noted to range between 0.93-1.09.  - Strict I/Os q2 hrs per ICU protocol  - Diaz catheter for strict I/Os  - CMP, Mg, Phos, ionized calcium, lactic acid, and VBG on admission to ICU  - High intensity electrolyte replacement protocol ordered on admission to ICU  - BMP qDay  - HOLDING PTA Flomax 0.4 mg qAM for hypotension      Endocrine:  # DM-II   - Hypoglycemia protocols ordered  - Maintain BG less than 180 mg/dL  - Medium- intensity sliding scale insulin ordered on admission to ICU  - HOLDING PTA Metformin 500 mg qAM      ID:  # Susanne-operative antibiotics   - Obtain blood, sputum, and urine cultures if patient's temperature less than 95.0F, or greater than 101.5F  - MRSA and Covid19/Flu/RSV swabs sent on admission to ICU  - Cefazolin 1 g q8 hrs, managed by Orthopedic team      Hematology:    # Acute post-operative blood  "loss anemia  # Drug-induced coagulopathy   - Transfuse if hemoglobin less than 7.0  - CBC, INR, PTT, fibrinogen, and type and screen sent on admission to ICU  - CBC qAM       Musculoskeletal:  # Left total hip arthroplasty with hardware removal  EBL estimated at 250 ml and total intraoperative LR administration was totaled at 800 ml.  - PT/OT to eval and treat as needed  - Orthopedic team primary and appreciate recommendations       Skin:  # Left hip surgical incision  - Hip incision managed by Orthopedic team  - Appreciate diligent nursing cares      General Cares/Prophylaxis:    DVT Prophylaxis: Pneumatic Compression Devices  GI Prophylaxis: Not indicated  Restraints: Not indicated  Family Communication: Son updated at bedside  Code Status: No CPR- Do NOT Intubate    Lines/tubes/drains:  - PIVs  - Arterial line  - Diaz catheter    Disposition:  - [facility] ICU    I spent a total of 65 minutes (excluding procedure time) personally providing and directing critical care services at the bedside and on the critical care unit for MEMO Ruth CNP      Clinically Significant Risk Factors Present on Admission             # Anion Gap Metabolic Acidosis: Highest Anion Gap = 20 mmol/L in last 2 days, will monitor and treat as appropriate   # Coagulation Defect: INR = 1.24 (Ref range: 0.85 - 1.15) and/or PTT = 31 Seconds (Ref range: 22 - 38 Seconds), will monitor for bleeding  # Drug Induced Platelet Defect: home medication list includes an antiplatelet medication    # Heart failure with preserved ejection fraction: EF >50% and home medication list includes sacubitril/valsartan (Entresto)  # Circulatory Shock: required vasopressors within past 24 hours          # Overweight: Estimated body mass index is 26.16 kg/m  as calculated from the following:    Height as of this encounter: 1.72 m (5' 7.72\").    Weight as of this encounter: 77.4 kg (170 lb 9.6 oz).            Code Status: No CPR- Do NOT " Intubate       -----------------------------------------------------------------------    REVIEW OF SYSTEMS:  Review of Systems   Unable to perform ROS: Unstable vital signs       PAST MEDICAL HISTORY:   Past Medical History:   Diagnosis Date    Anxiety disorder     BPH (benign prostatic hyperplasia)     CAD (coronary artery disease)     Cardiomyopathy (H)     Cataract     Cervical cord myelomalacia (H)     CHF (congestive heart failure) (H)     Chronic ischemic heart disease     Chronic pain syndrome with opioid dependence     CKD (chronic kidney disease) stage 3, GFR 30-59 ml/min (H)     Closed comminuted intertrochanteric fracture of proximal end of left femur (H)     Closed fracture of neck of left femur (H)     Congenital multiple renal cysts     Cyst of right kidney     Dysarthria     Dyslipidemia     Dysphagia     Dyspnea     Exudative age-related macular degeneration of right eye with inactive choroidal neovascularization (H)     Femur fracture, left 09/2024    surgery in october    Folic acid deficiency     GERD (gastroesophageal reflux disease)     Hyperglycemia     Hyperlipidemia     Hypertension     Insomnia     Macular degeneration     MDD (major depressive disorder)     Mixed hyperlipidemia     Obesity     RAYMOND (obstructive sleep apnea)     Osteoarthritis     Osteoporosis     Pathological fracture of left femur due to age-related osteoporosis (H)     Primary osteoarthritis of right knee     PVD (peripheral vascular disease) (H)     Renal insufficiency syndrome     SAH (subarachnoid hemorrhage) (H)     Somnolence     Spinal stenosis     TBI (traumatic brain injury) (H)     Toxic encephalopathy     Type 2 diabetes mellitus (H)     Urinary incontinence      SURGICAL HISTORY:  Past Surgical History:   Procedure Laterality Date    CARPAL TUNNEL RELEASE      CERVICAL FUSION      CERVICAL SPINE SURGERY  2009    X2    CORONARY ARTERY BYPASS GRAFT  2003    KIDNEY CYST REMOVAL      LUMBAR DECOMPRESSION L3-S1   2010    LUMBAR SPINE SURGERY  2009    PTCA OF LAD (FOR FAILED GRAFT FAILURE)  2004    TOTAL HIP ARTHROPLASTY Right 2020    ULNAR TUNNEL RELEASE  2012     SOCIAL HISTORY:  Social History     Socioeconomic History    Marital status:      Spouse name: None    Number of children: None    Years of education: None    Highest education level: None   Tobacco Use    Smoking status: Former     Current packs/day: 0.00     Average packs/day: 0.5 packs/day for 53.9 years (26.9 ttl pk-yrs)     Types: Cigarettes     Start date: 1970     Quit date: 2024     Years since quittin.1    Smokeless tobacco: Never   Vaping Use    Vaping status: Never Used   Substance and Sexual Activity    Alcohol use: Not Currently     Comment: rare     Social Determinants of Health     Food Insecurity: No Food Insecurity (10/6/2023)    [facility]    Hunger Vital Sign     Worried About Running Out of Food in the Last Year: Never true     Ran Out of Food in the Last Year: Never true   Interpersonal Safety: Low Risk  (10/1/2024)    Interpersonal Safety     Do you feel physically and emotionally safe where you currently live?: Yes     Within the past 12 months, have you been hit, slapped, kicked or otherwise physically hurt by someone?: No     Within the past 12 months, have you been humiliated or emotionally abused in other ways by your partner or ex-partner?: No     FAMILY HISTORY:   History reviewed. No pertinent family history.  ALLERGIES:   Allergies   Allergen Reactions    Iodinated Contrast Media Shortness Of Breath    Atorvastatin Fatigue    Lisinopril Cough    Oxycodone Confusion    Sertraline Dizziness     MEDICATIONS:  Current Facility-Administered Medications   Medication Dose Route Frequency Provider Last Rate Last Admin    [START ON 10/4/2024] acetaminophen (TYLENOL) oral liquid 650 mg  650 mg Per NG tube Q4H PRN Amador Dan, MEMO CNP        [START ON 10/4/2024] acetaminophen (TYLENOL) tablet 650  mg  650 mg Oral Q4H PRN Les Douglas PA-C        acetaminophen (TYLENOL) tablet 975 mg  975 mg Oral Q8H Les Douglas PA-C        albuterol (PROVENTIL) neb solution 2.5 mg  2.5 mg Nebulization Q2H PRN Amador Dan APRN CNP        [Held by provider] aspirin (ASA) EC tablet 325 mg  325 mg Oral Daily Les Douglas PA-C        benzocaine-menthol (CHLORASEPTIC) 6-10 MG lozenge 1 lozenge  1 lozenge Buccal Q1H PRN Les Douglas PA-C        bisacodyl (DULCOLAX) suppository 10 mg  10 mg Rectal Daily PRN Amador Dan APRN CNP        ceFAZolin (ANCEF) 1 g vial to attach to  ml bag for ADULT or 50 ml bag for PEDS  1 g Intravenous Q8H Les Douglas PA-C   1 g at 10/01/24 2334    glucose gel 15-30 g  15-30 g Oral Q15 Min PRN Amador Dan APRN CNP        Or    dextrose 50 % injection 25-50 mL  25-50 mL Intravenous Q15 Min PRN Amador Dan APRN CNP        Or    glucagon injection 1 mg  1 mg Subcutaneous Q15 Min PRN Amador Dan APRN CNP        DULoxetine (CYMBALTA) DR capsule 30 mg  30 mg Oral BID Les Douglas PA-C        [Held by provider] empagliflozin (JARDIANCE) tablet 10 mg  10 mg Oral Daily Les Douglas PA-C        folic acid (FOLVITE) tablet 1 mg  1 mg Oral Daily Les Douglas PA-C        [Held by provider] furosemide (LASIX) half-tab 10 mg  10 mg Oral Daily Les Douglas PA-C        HYDROmorphone (DILAUDID) injection 0.2 mg  0.2 mg Intravenous Q2H PRN Lse Douglas PA-C        Or    HYDROmorphone (DILAUDID) injection 0.4 mg  0.4 mg Intravenous Q2H PRN Les Douglas PA-C        HYDROmorphone (DILAUDID) tablet 2 mg  2 mg Oral Q4H PRN Les Douglas PA-C        Or    HYDROmorphone (DILAUDID) tablet 4 mg  4 mg Oral Q4H PRN Les Douglas PA-C        hydrOXYzine HCl (ATARAX) tablet 10 mg  10 mg Oral Q6H PRN Les Douglas PA-C        insulin aspart (NovoLOG) injection (RAPID ACTING)  1-7 Units Subcutaneous  TID AC Amador Dan APRN CNP        insulin aspart (NovoLOG) injection (RAPID ACTING)  1-5 Units Subcutaneous At Bedtime Amador Dan APRN CNP        lactated ringers infusion   Intravenous Continuous Les Douglas PA-C 75 mL/hr at 10/01/24 2214 New Bag at 10/01/24 2214    latanoprost (XALATAN) 0.005 % ophthalmic solution 1 drop  1 drop Both Eyes At Bedtime Les Douglas PA-C   1 drop at 10/01/24 2335    lidocaine (LMX4) cream   Topical Q1H PRN Les Douglas PA-C        lidocaine 1 % 0.1-1 mL  0.1-1 mL Other Q1H PRN Les Douglas PA-C        [START ON 10/3/2024] magnesium hydroxide (MILK OF MAGNESIA) suspension 30 mL  30 mL Oral Daily PRN Les Douglas PA-C        miconazole (MICATIN) 2 % powder   Topical BID Les Douglas PA-C   Given at 10/01/24 2335    naloxone (NARCAN) injection 0.2 mg  0.2 mg Intravenous Q2 Min PRN Eriberto Simmons RPH        Or    naloxone (NARCAN) injection 0.4 mg  0.4 mg Intravenous Q2 Min PRN Eriberto Simmons RPH        Or    naloxone (NARCAN) injection 0.2 mg  0.2 mg Intramuscular Q2 Min PRN Eriberto Simmons RPH        Or    naloxone (NARCAN) injection 0.4 mg  0.4 mg Intramuscular Q2 Min PRN Eriberto Simmons RPH        norepinephrine (LEVOPHED) 4 mg in  mL PERIPHERAL infusion  0.01-0.125 mcg/kg/min Intravenous Continuous Amador Dan APRN CNP 17.4 mL/hr at 10/02/24 0110 0.06 mcg/kg/min at 10/02/24 0110    ondansetron (ZOFRAN ODT) ODT tab 4 mg  4 mg Oral Q6H PRN Amador Dan APRN CNP        Or    ondansetron (ZOFRAN) injection 4 mg  4 mg Intravenous Q6H PRN SyAmador waldron APRN CNP        polyethylene glycol (MIRALAX) Packet 17 g  17 g Oral Daily Les Douglas PA-C        polyethylene glycol (MIRALAX) Packet 17 g  17 g Oral Daily PRN Amador Dan APRN CNP        prochlorperazine (COMPAZINE) injection 5 mg  5 mg Intravenous Q6H PRN Amador Dan APRN CNP        Or    prochlorperazine (COMPAZINE) tablet 5 mg  5 mg Oral Q6H PRN Sy  MEMO English CNP        Or    prochlorperazine (COMPAZINE) suppository 12.5 mg  12.5 mg Rectal Q12H PRN Amador Dan APRN CNP        ROPivacaine (NAROPIN) 5 MG/ mg, ketorolac (TORADOL) 30 mg, EPINEPHrine (ADRENALIN) 0.6 mg in sodium chloride 0.9 % 50 mL (ORTHO BRANDON LOW DOSE)   INTRA-ARTICULAR On Call to OR Les Douglas PA-C        ROPivacaine (NAROPIN) 5 MG/ mg, ketorolac (TORADOL) 30 mg, EPINEPHrine (ADRENALIN) 0.6 mg in sodium chloride 0.9 % 50 mL (ORTHO BRANDON LOW DOSE)    PRN Kathryn Nicole MD   Given at 10/01/24 4177    [Held by provider] sacubitril-valsartan (ENTRESTO) 24-26 MG per tablet 1 tablet  1 tablet Oral BID Les Douglas PA-C        senna-docusate (SENOKOT-S/PERICOLACE) 8.6-50 MG per tablet 1 tablet  1 tablet Oral BID Les Duoglas PA-C        senna-docusate (SENOKOT-S/PERICOLACE) 8.6-50 MG per tablet 1 tablet  1 tablet Oral BID PRN Amador Dan APRN CNP        Or    senna-docusate (SENOKOT-S/PERICOLACE) 8.6-50 MG per tablet 2 tablet  2 tablet Oral BID PRN Amador Dan APRN CNP        sodium chloride (PF) 0.9% PF flush 3 mL  3 mL Intracatheter Q8H Les Douglas PA-C        sodium chloride (PF) 0.9% PF flush 3 mL  3 mL Intracatheter q1 min prn Les Douglas PA-C        [Held by provider] tamsulosin (FLOMAX) capsule 0.4 mg  0.4 mg Oral QAM Les Douglas PA-C        trolamine salicylate (ASPERCREME) 10 % cream   Topical Daily PRN Les Douglas PA-C        vitamin D3 (CHOLECALCIFEROL) tablet 50 mcg  50 mcg Oral Daily Les Douglas PA-C           PHYSICAL EXAMINATION:  Temp:  [96.9  F (36.1  C)-98.9  F (37.2  C)] 98.6  F (37  C)  Pulse:  [] 109  Resp:  [0-16] 14  BP: ()/(30-69) 89/59  MAP:  [47 mmHg-78 mmHg] 78 mmHg  Arterial Line BP: ()/(35-61) 133/52  SpO2:  [88 %-100 %] 99 %    Physical Exam  Vitals and nursing note reviewed.   Constitutional:       General: He is not in acute distress.     Appearance: He  is underweight. He is ill-appearing.      Interventions: Nasal cannula in place.   HENT:      Head: Normocephalic and atraumatic.      Mouth/Throat:      Mouth: Mucous membranes are moist.      Pharynx: Oropharynx is clear.   Eyes:      Extraocular Movements: Extraocular movements intact.      Conjunctiva/sclera: Conjunctivae normal.      Pupils: Pupils are equal, round, and reactive to light.   Cardiovascular:      Rate and Rhythm: Normal rate. Rhythm irregular.      Heart sounds: Normal heart sounds. No murmur heard.     No friction rub.   Pulmonary:      Effort: Pulmonary effort is normal. No respiratory distress.      Breath sounds: Normal breath sounds. No stridor. No wheezing, rhonchi or rales.   Abdominal:      General: Abdomen is flat. Bowel sounds are normal. There is no distension.      Palpations: Abdomen is soft.      Tenderness: There is no abdominal tenderness.   Musculoskeletal:      Right lower leg: No edema.      Left lower leg: No edema.   Skin:     General: Skin is dry.      Capillary Refill: Capillary refill takes 2 to 3 seconds.      Coloration: Skin is pale.   Neurological:      Mental Status: He is oriented to person, place, and time and easily aroused.      GCS: GCS eye subscore is 3. GCS verbal subscore is 5. GCS motor subscore is 6.      Cranial Nerves: Cranial nerves 2-12 are intact.   Psychiatric:         Behavior: Behavior is cooperative.         LABS: Reviewed.   Arterial Blood Gases   No lab results found in last 7 days.    Venous Blood Gas  No lab results found in last 7 days.     Complete Blood Count   Recent Labs   Lab 10/01/24  2242 10/01/24  1846   WBC 10.0  --    HGB 12.2* 11.9*     --      Basic Metabolic Panel  Recent Labs   Lab 10/01/24  2242 10/01/24  2144 10/01/24  1555 10/01/24  1014     --   --   --    POTASSIUM 4.7  --   --  4.6   CHLORIDE 101  --   --   --    CO2 16*  --   --   --    BUN 28.1*  --   --   --    CR 1.28*  --   --   --    * 168* 128*  108*     Liver Function Tests  Recent Labs   Lab 10/01/24  2242   AST 19   ALT 11   ALKPHOS 72   BILITOTAL 1.5*   ALBUMIN 3.8   INR 1.24*     Coagulation Profile  Recent Labs   Lab 10/01/24  2242   INR 1.24*   PTT 31       IMAGING:  Recent Results (from the past 24 hour(s))   Echocardiogram Complete   Result Value    LVEF  55-60%    Narrative    [id number]  [id number]  [id number]  [id number]^ERON^SHAGGY^SWAPNIL HERNANDEZ     [facility]  Echocardiography Laboratory  [address] [place] [id number]     Name: STEVE CERVANTES  MRN: [id number]  : 1945  Study Date: 10/01/2024 08:03 PM  Age: 78 yrs  Gender: Male  Patient Location: [facility]  Reason For Study: MI acute  Ordering Physician: SHAGGY LITTLEJOHN  Performed By: Brock Cao     BSA: 1.9 m2  Height: 67 in  Weight: 170 lb  HR: 105  BP: 110/67 mmHg  ______________________________________________________________________________  Procedure  Complete Portable Echo Adult. Optison (NDC #[id number]) given intravenously.  ______________________________________________________________________________  Interpretation Summary     Left ventricular systolic function is normal.  The visual ejection fraction is 55-60%.  No regional wall motion abnormalities noted.  The study was technically difficult. There is no comparison study available.  ______________________________________________________________________________  Left Ventricle  The left ventricle is normal in size. There is mild to moderate concentric  left ventricular hypertrophy. Left ventricular systolic function is normal.  The visual ejection fraction is 55-60%. Left ventricular diastolic function is  indeterminate. No regional wall motion abnormalities noted.     Right Ventricle  The right ventricle is normal size. The right ventricular systolic function is  normal.     Atria  Normal left atrial size. Right atrial size is normal.     Mitral Valve  The mitral valve leaflets are mildly  thickened. There is mild mitral annular  calcification. There is trace mitral regurgitation.     Tricuspid Valve  No tricuspid regurgitation. Right ventricular systolic pressure could not be  approximated due to inadequate tricuspid regurgitation. IVC diameter >2.1 cm  collapsing <50% with sniff suggests a high RA pressure estimated at 15 mmHg or  greater.     Aortic Valve  There is moderate trileaflet aortic sclerosis. No aortic stenosis is present.     Pulmonic Valve  The pulmonic valve is not well visualized.     Vessels  The aortic root is normal size.     Pericardium  There is no pericardial effusion.     Rhythm  Sinus rhythm was noted.  ______________________________________________________________________________  MMode/2D Measurements & Calculations     IVSd: 1.5 cm  LVIDd: 4.4 cm  LVIDs: 3.4 cm  LVPWd: 1.3 cm  FS: 23.5 %  LV mass(C)d: 242.7 grams  LV mass(C)dI: 128.6 grams/m2  Ao root diam: 3.3 cm  asc Aorta Diam: 3.9 cm  LVOT diam: 2.3 cm  LVOT area: 4.2 cm2  Ao root diam index Ht(cm/m): 1.9  Ao root diam index BSA (cm/m2): 1.7  Asc Ao diam index BSA (cm/m2): 2.1  Asc Ao diam index Ht(cm/m): 2.3  LA Volume (BP): 34.8 ml     LA Volume Index (BP): 18.4 ml/m2  RV Base: 4.4 cm  RWT: 0.58  TAPSE: 1.3 cm     Doppler Measurements & Calculations  MV E max roxane: 111.0 cm/sec  MV A max roxane: 76.3 cm/sec  MV E/A: 1.5  MV dec slope: 700.4 cm/sec2  MV dec time: 0.16 sec  PA acc time: 0.07 sec  E/E' av.8  Lateral E/e': 6.9  Medial E/e': 8.8     ______________________________________________________________________________  Report approved by: Jenna Castellano 10/01/2024 09:15 PM

## 2024-10-02 NOTE — ANESTHESIA POSTPROCEDURE EVALUATION
Patient: Chris Bhakta    Procedure: Procedure(s):  LEFT TOTAL HIP ARTHROPLASTY  LEFT FEMORAL INTRAMEDULARY NAIL REMOVAL       Anesthesia Type:  General    Note:  Disposition: ICU            ICU Sign Out: Anesthesiologist/ICU physician sign out WAS performed   Postop Pain Control: Uneventful            Sign Out: Well controlled pain   PONV: No   Neuro/Psych:    Airway/Respiratory: Uneventful            Sign Out: Acceptable/Baseline resp. status   CV/Hemodynamics:    Other NRE:    DID A NON-ROUTINE EVENT OCCUR?     Event details/Postop Comments:  Patient had significant post op hypotension.  Fluids, pressors started.  Amy placed.  Hemoglobin 11.6, stat echo obtained.             Last vitals:  Vitals Value Taken Time   BP 73/49 10/01/24 2018   Temp 36.7  C (98.1  F) 10/01/24 1945   Pulse 105 10/01/24 2023   Resp 11 10/01/24 2023   SpO2 100 % 10/01/24 1945   Vitals shown include unfiled device data.    Electronically Signed By: Janice Abraham  October 1, 2024  8:26 PM

## 2024-10-02 NOTE — PROGRESS NOTES
Subjective and Interval History  78-year-old male patient with past medical history significant for CAD, CABG in 2009, heart failure, CKD stage III, and hypertension.  The patient was admitted to the intensive care unit for postoperative shock after left total hip arthroplasty that was done on elective basis.  The patient has improved significantly, currently he is off pressors, and his blood pressure is at the high side, he is mentating great, alert oriented x 3 with no focal neurologic deficit.  He tolerated oral intake and diet.    Objective  CBC RESULTS:   Recent Labs   Lab Test 10/02/24  0540   WBC 9.8   RBC 3.36*   HGB 11.4*   HCT 33.4*   MCV 99   MCH 33.9*   MCHC 34.1   RDW 13.2   *     Recent Labs   Lab Test 10/02/24  0744 10/02/24  0543 10/02/24  0540 10/02/24  0151 10/01/24  2242   NA  --   --  138  --  137   POTASSIUM  --   --  5.3  --  4.7   CHLORIDE  --   --  105  --  101   CO2  --   --  22  --  16*   ANIONGAP  --   --  11  --  20*   * 163* 158*   < > 165*   BUN  --   --  30.4*  --  28.1*   CR  --   --  1.37*  --  1.28*   VIVIAN  --   --  8.9  --  9.3    < > = values in this interval not displayed.     Exam:  Constitutional: healthy, alert, and no distress  Head: Normocephalic. No masses, lesions, tenderness or abnormalities  Cardiovascular:  RRR. No murmurs, clicks gallops or rub  Respiratory: Good diaphragmatic excursion. Lungs clear  Gastrointestinal: Abdomen soft, non-tender. No masses, organomegaly  : Deferred  Musculoskeletal: swelling in the LLE post operatively after hip replacement  Skin: no suspicious lesions or rashes  Neurologic: Non focal   Psychiatric: mentation appears normal and affect normal/bright      Assessment and Plan:   This is an 86-year-old male patient with past medical history significant for CAD SP CABG, he was admitted to the intensive care unit for postoperative shock, likely vasoplegic +/- hypovolemic.  The patient has improved significantly with fluid  resuscitation.  He is currently off pressors.  Patient has stable to be transferred out of the intensive care unit.    Left total hip arthroplasty  Postoperative shock, resolved.  Anemia  Acute kidney injury on CKD  Type II myocardial infarction secondary to demand/supply mismatch ischemia    -Start patient on DVT prophylaxis with Lovenox 40 mg once daily  -Can remove the arterial line  -Continue IV fluid maintenance with LR 75 cc/h-monitor urine output and kidney function  -For the elevated troponin, it is still trending up to 157, I will order serial troponin every 6 hours, I will repeat EKG.  His EKG on admission to the intensive care unit showed nonspecific interventricular conduction delay with wide QRS, but otherwise no specific ischemic signs.  Echocardiogram showed no wall motion abnormalities.  -The patient is stable to transfer out of the intensive care unit, I signed out to the hospitalist service to assume care.    Chart documentation was completed, in part, with [employer] voice-recognition software. Even though reviewed, some grammatical, spelling, and word errors may remain.    Darci Bhatti MD   Pulmonary and Critical Care

## 2024-10-02 NOTE — PROGRESS NOTES
"Chris Bhakta  10/2/2024  POD # 1 s/p Removal of left IMN and Total hip replacement with revision stem.   Admit Date:  10/1/2024      Doing well.  Clean wound without signs of infection.  Normal healing wound.  No excessive bleeding  Pain well-controlled.  Objective: patient denies being in pain. He denies shortness of breath or chest pain.   Blood pressure 102/52, pulse 75, temperature 99.6  F (37.6  C), temperature source Axillary, resp. rate 10, height 1.72 m (5' 7.72\"), weight 77.4 kg (170 lb 9.6 oz), SpO2 96%.    Temperatures:  Current - Temp: 99.6  F (37.6  C); Max - Temp  Av.5  F (36.9  C)  Min: 96.9  F (36.1  C)  Max: 99.6  F (37.6  C)  Pulse range: Pulse  Av.7  Min: 70  Max: 121  Blood pressure range: Systolic (24hrs), Av , Min:56 , Max:137   ; Diastolic (24hrs), Av, Min:30, Max:70    Exam:  CMS: intact  alert, stable, wound ok  Dressing c/d/I  Calf s/nt  DF/PF   Communicates clearly with examiner.     Labs:  Recent Labs   Lab Test 10/02/24  0540 10/01/24  2242 10/01/24  1014   POTASSIUM 5.3 4.7 4.6     Recent Labs   Lab Test 10/02/24  0540 10/01/24  2242 10/01/24  1846   HGB 11.4* 12.2* 11.9*     Recent Labs   Lab Test 10/01/24  2242 09/26/16  1402   INR 1.24* 1.08     Recent Labs   Lab Test 10/02/24  0540 10/01/24  2242 09/27/16  1705   * 165 156       PLAN: WBAT in the LLE. No hip abduction. Will continue to follow closely. We appreciate the assistance in the care of this patient.     "

## 2024-10-02 NOTE — PLAN OF CARE
"  Problem: Adult Inpatient Plan of Care  Goal: Plan of Care Review  Description: The Plan of Care Review/Shift note should be completed every shift.  The Outcome Evaluation is a brief statement about your assessment that the patient is improving, declining, or no change.  This information will be displayed automatically on your shift  note.  Outcome: Progressing  Flowsheets (Taken 10/2/2024 0661)  Outcome Evaluation: Pt is A&Ox4, pt was on pressors when transferred up to ICU. pt arrived to ICU around 2115, hip immobilizer pillow in place. Pt' son was at bedside initially to help settle and then went home for the night. Pt was able to rest comfortably throughout the night, Denied any pain or wanting to take any pain meds. Able to titrate off pressors w/ continued IV maintenance fluids. SR-ST on tele, 90s-120s w/ frequent PACs. On RA, Lung sounds clear. No BM overnight, Denied any pain or discomfort, pt wanting to remain turned on side off of surgical site. Pillows in use for support. Afebrile.  Overall Patient Progress: improving  Goal: Patient-Specific Goal (Individualized)  Description: You can add care plan individualizations to a care plan. Examples of Individualization might be:  \"Parent requests to be called daily at 9am for status\", \"I have a hard time hearing out of my right ear\", or \"Do not touch me to wake me up as it startles  me\".  Outcome: Progressing  Goal: Absence of Hospital-Acquired Illness or Injury  Outcome: Progressing  Intervention: Identify and Manage Fall Risk  Recent Flowsheet Documentation  Taken 10/2/2024 0400 by Mandie Almanza, RN  Safety Promotion/Fall Prevention:   clutter free environment maintained   increase visualization of patient   lighting adjusted   room door open   room near nurse's station   room organization consistent   safety round/check completed   supervised activity   assistive device/personal items within reach  Taken 10/2/2024 0000 by Mandie Almanza, RN  Safety " Promotion/Fall Prevention:   clutter free environment maintained   increase visualization of patient   lighting adjusted   room door open   room near nurse's station   room organization consistent   safety round/check completed   supervised activity   assistive device/personal items within reach  Taken 10/1/2024 2121 by Mandie Almanza RN  Safety Promotion/Fall Prevention:   clutter free environment maintained   increase visualization of patient   lighting adjusted   room door open   room near nurse's station   room organization consistent   safety round/check completed   supervised activity   assistive device/personal items within reach  Intervention: Prevent Skin Injury  Recent Flowsheet Documentation  Taken 10/2/2024 0600 by Mandie Almanza RN  Body Position:   supine   turned   heels elevated   upper extremity elevated  Taken 10/2/2024 0400 by Mandie Almanza RN  Body Position:   supine   turned   heels elevated   upper extremity elevated  Skin Protection:   incontinence pads utilized   pulse oximeter probe site changed   silicone foam dressing in place   transparent dressing maintained  Device Skin Pressure Protection:   absorbent pad utilized/changed   adhesive use limited   positioning supports utilized   tubing/devices free from skin contact  Taken 10/2/2024 0200 by Mandie Almanza RN  Body Position:   supine   turned   heels elevated   upper extremity elevated  Taken 10/2/2024 0000 by Mandie Almanza RN  Body Position:   supine   turned   heels elevated   upper extremity elevated  Skin Protection:   incontinence pads utilized   pulse oximeter probe site changed   silicone foam dressing in place   transparent dressing maintained  Device Skin Pressure Protection:   absorbent pad utilized/changed   adhesive use limited   positioning supports utilized   tubing/devices free from skin contact  Taken 10/1/2024 2200 by Mandie Almanza RN  Body Position:   supine   turned   heels elevated   upper extremity  elevated  Taken 10/1/2024 2121 by Mandie Almanza RN  Body Position:   supine   turned   heels elevated   upper extremity elevated  Skin Protection:   incontinence pads utilized   pulse oximeter probe site changed   silicone foam dressing in place   transparent dressing maintained  Device Skin Pressure Protection:   absorbent pad utilized/changed   adhesive use limited   positioning supports utilized   tubing/devices free from skin contact  Intervention: Prevent and Manage VTE (Venous Thromboembolism) Risk  Recent Flowsheet Documentation  Taken 10/2/2024 0400 by Mandie Almanza RN  VTE Prevention/Management: SCDs on (sequential compression devices)  Taken 10/2/2024 0000 by Mandie Almanza RN  VTE Prevention/Management: SCDs on (sequential compression devices)  Taken 10/1/2024 2121 by Mandie Almanza RN  VTE Prevention/Management: SCDs on (sequential compression devices)  Intervention: Prevent Infection  Recent Flowsheet Documentation  Taken 10/2/2024 0400 by Mandie Almanza RN  Infection Prevention:   cohorting utilized   environmental surveillance performed   equipment surfaces disinfected   hand hygiene promoted   rest/sleep promoted  Taken 10/2/2024 0000 by Mandie Almanza RN  Infection Prevention:   cohorting utilized   environmental surveillance performed   equipment surfaces disinfected   hand hygiene promoted   rest/sleep promoted  Taken 10/1/2024 2121 by Mandie Almanza RN  Infection Prevention:   cohorting utilized   environmental surveillance performed   equipment surfaces disinfected   hand hygiene promoted   rest/sleep promoted  Goal: Optimal Comfort and Wellbeing  Outcome: Progressing  Intervention: Monitor Pain and Promote Comfort  Recent Flowsheet Documentation  Taken 10/1/2024 2200 by Mandie Almanza RN  Pain Management Interventions:   cold applied   pillow support provided   quiet environment facilitated   repositioned   rest  Intervention: Provide Person-Centered Care  Recent Flowsheet  Documentation  Taken 10/2/2024 0400 by Mandie Almanza RN  Trust Relationship/Rapport:   care explained   reassurance provided  Taken 10/2/2024 0000 by Mandie Almanza RN  Trust Relationship/Rapport:   care explained   reassurance provided  Taken 10/1/2024 2121 by Mandie Almanza RN  Trust Relationship/Rapport:   care explained   reassurance provided  Goal: Readiness for Transition of Care  Outcome: Progressing     Problem: Risk for Delirium  Goal: Optimal Coping  Outcome: Progressing  Intervention: Optimize Psychosocial Adjustment to Delirium  Recent Flowsheet Documentation  Taken 10/2/2024 0400 by Mandie Almanza RN  Supportive Measures:   active listening utilized   positive reinforcement provided   relaxation techniques promoted  Taken 10/2/2024 0000 by Mandie Almanza RN  Supportive Measures:   active listening utilized   positive reinforcement provided   relaxation techniques promoted  Taken 10/1/2024 2121 by Mandie Almanza RN  Supportive Measures:   active listening utilized   positive reinforcement provided   relaxation techniques promoted  Family/Support System Care:   involvement promoted   presence promoted  Goal: Improved Behavioral Control  Outcome: Progressing  Intervention: Prevent and Manage Agitation  Recent Flowsheet Documentation  Taken 10/2/2024 0400 by Mandie Almanza RN  Environment Familiarity/Consistency: daily routine followed  Taken 10/2/2024 0000 by Mandie Almanza RN  Environment Familiarity/Consistency: daily routine followed  Taken 10/1/2024 2121 by Mandie Almanza RN  Environment Familiarity/Consistency: daily routine followed  Intervention: Minimize Safety Risk  Recent Flowsheet Documentation  Taken 10/2/2024 0400 by Mandie Almanza RN  Communication Enhancement Strategies:   call light answered in person   one-step directions provided  Enhanced Safety Measures:   room near unit station   review medications for side effects with activity   pain management  Trust  Relationship/Rapport:   care explained   reassurance provided  Taken 10/2/2024 0000 by Mandie Almanza RN  Communication Enhancement Strategies:   call light answered in person   one-step directions provided  Enhanced Safety Measures:   room near unit station   review medications for side effects with activity   pain management  Trust Relationship/Rapport:   care explained   reassurance provided  Taken 10/1/2024 2121 by Mandie Almanza RN  Communication Enhancement Strategies:   call light answered in person   one-step directions provided  Enhanced Safety Measures:   room near unit station   review medications for side effects with activity   pain management  Trust Relationship/Rapport:   care explained   reassurance provided  Goal: Improved Attention and Thought Clarity  Outcome: Progressing  Intervention: Maximize Cognitive Function  Recent Flowsheet Documentation  Taken 10/2/2024 0400 by Mandie Almanza RN  Sensory Stimulation Regulation:   care clustered   lighting decreased   quiet environment promoted  Reorientation Measures:   calendar in view   clock in view   reorientation provided  Taken 10/2/2024 0000 by Mandie Almanza RN  Sensory Stimulation Regulation:   care clustered   lighting decreased   quiet environment promoted  Reorientation Measures:   calendar in view   clock in view   reorientation provided  Taken 10/1/2024 2121 by Mandie Almanza RN  Sensory Stimulation Regulation:   care clustered   lighting decreased   quiet environment promoted  Reorientation Measures:   calendar in view   clock in view   reorientation provided  Goal: Improved Sleep  Outcome: Progressing  Intervention: Promote Sleep  Recent Flowsheet Documentation  Taken 10/2/2024 0400 by Mandie Almanza RN  Sleep/Rest Enhancement:   comfort measures   noise level reduced   regular sleep/rest pattern promoted   relaxation techniques promoted   room darkened  Taken 10/2/2024 0000 by Mandie Almanza RN  Sleep/Rest Enhancement:    comfort measures   noise level reduced   regular sleep/rest pattern promoted   relaxation techniques promoted   room darkened  Taken 10/1/2024 2121 by Mandie Almanza RN  Sleep/Rest Enhancement:   comfort measures   noise level reduced   regular sleep/rest pattern promoted   relaxation techniques promoted   room darkened     Problem: Skin Injury Risk Increased  Goal: Skin Health and Integrity  Outcome: Progressing  Intervention: Plan: Nurse Driven Intervention: Moisture Management  Recent Flowsheet Documentation  Taken 10/2/2024 0400 by Mandie Almanza RN  Bathing/Skin Care:   back care   bath, chlorhexidine wipes   bath, complete   wipes, CHG   dressed/undressed   electrode patches/site rotation   incontinence care   linen changed  Taken 10/2/2024 0000 by Mandie Almanza RN  Bathing/Skin Care:   back care   bath, chlorhexidine wipes   bath, complete   wipes, CHG   dressed/undressed   electrode patches/site rotation   incontinence care   linen changed  Taken 10/1/2024 2121 by Mandie Almanza RN  Bathing/Skin Care:   back care   bath, chlorhexidine wipes   bath, complete   wipes, CHG   dressed/undressed   electrode patches/site rotation   incontinence care   linen changed  Intervention: Plan: Nurse Driven Intervention: Friction and Shear  Recent Flowsheet Documentation  Taken 10/2/2024 0400 by Mandie Almanza RN  Friction/Shear Interventions:   HOB 30 degrees or less   Silicone foam sacral dressing   Repositioning device (TAP system, etc.)   Lateral transfer device (hovermat, etc.)  Taken 10/2/2024 0000 by Mandie Almanza RN  Friction/Shear Interventions:   HOB 30 degrees or less   Silicone foam sacral dressing   Repositioning device (TAP system, etc.)   Lateral transfer device (hovermat, etc.)  Taken 10/1/2024 2121 by Mandie Almanza RN  Friction/Shear Interventions:   HOB 30 degrees or less   Silicone foam sacral dressing   Repositioning device (TAP system, etc.)   Lateral transfer device (hovermat,  etc.)  Intervention: Optimize Skin Protection  Recent Flowsheet Documentation  Taken 10/2/2024 0600 by Mandie Almanza RN  Head of Bed (HOB) Positioning: HOB at 15 degrees  Taken 10/2/2024 0400 by Mandie Almanza RN  Skin Protection:   incontinence pads utilized   pulse oximeter probe site changed   silicone foam dressing in place   transparent dressing maintained  Activity Management: bedrest  Head of Bed (HOB) Positioning: HOB at 15 degrees  Taken 10/2/2024 0200 by Mandie Almanza RN  Head of Bed (HOB) Positioning: HOB at 15 degrees  Taken 10/2/2024 0000 by Mandie Almanza RN  Skin Protection:   incontinence pads utilized   pulse oximeter probe site changed   silicone foam dressing in place   transparent dressing maintained  Activity Management: bedrest  Head of Bed (HOB) Positioning: HOB at 15 degrees  Taken 10/1/2024 2200 by Mandie Almanza RN  Head of Bed (HOB) Positioning: HOB at 15 degrees  Taken 10/1/2024 2121 by Mandie Almanza RN  Skin Protection:   incontinence pads utilized   pulse oximeter probe site changed   silicone foam dressing in place   transparent dressing maintained  Activity Management: bedrest  Head of Bed (HOB) Positioning: HOB at 15 degrees               Goal Outcome Evaluation:           Overall Patient Progress: improvingOverall Patient Progress: improving    Outcome Evaluation: Pt is A&Ox4, pt was on pressors when transferred up to ICU. pt arrived to ICU around 2115, hip immobilizer pillow in place. Pt' son was at bedside initially to help settle and then went home for the night. Pt was able to rest comfortably throughout the night, Denied any pain or wanting to take any pain meds. Able to titrate off pressors w/ continued IV maintenance fluids. SR-ST on tele, 90s-120s w/ frequent PACs. On RA, Lung sounds clear. No BM overnight, Denied any pain or discomfort, pt wanting to remain turned on side off of surgical site. Pillows in use for support. Afebrile.

## 2024-10-02 NOTE — PROGRESS NOTES
10/02/24 1000   Appointment Info   Signing Clinician's Name / Credentials (PT) Candice Fontenot PT, DPT   Living Environment   People in Home alone   Current Living Arrangements assisted living   Home Accessibility no concerns   Living Environment Comments Pt reports assist with medications only.   Self-Care   Activity/Exercise/Self-Care Comment Stand pivot to wc/pivot squat at baseline. Reports independence with bathing and dressing. MR via care everywhere Home Care PT note from 1/2024 indicated the pt is not safe to ambulate without physical assist; therefore, recommended use of the wc for locomotion.   General Information   Onset of Illness/Injury or Date of Surgery 10/02/24   Referring Physician Mel Soriano MD   Patient/Family Therapy Goals Statement (PT) Plans to go to [facility]   Pertinent History of Current Problem (include personal factors and/or comorbidities that impact the POC) 79 yo male s/p fall back in July of 2023; had a hip nailing at that time. Now presents for a YELENA by Dr. Nicole on 10/1/2024. Pt has been in/out of TCU post original fall. States he plans to go back to TCU at disch.   Existing Precautions/Restrictions fall;other (see comments)  (Monitor BP close as pt with soft pressures.)   General Observations Supine, abductor pillow in place. ? if needed as orders are for no active abduction?   Cognition   Affect/Mental Status (Cognition) WNL   Orientation Status (Cognition) oriented x 4   Follows Commands (Cognition) WFL  (Needs cues to wait at times for instruction, then repeat of cues.)   Safety Deficit (Cognition) minimal deficit;impulsivity;safety precautions awareness   Pain Assessment   Patient Currently in Pain Yes, see Vital Sign flowsheet  (L hip discomfort with seated flexion/sitting position intially.)   Integumentary/Edema   Integumentary/Edema Comments slight surgical edema at the LLE   Posture    Posture Forward head position;Protracted shoulders   Posture Comments Pt  with multiple spinal surgeries in the past.   Range of Motion (ROM)   ROM Comment L hip supine able to flex the hip via heel side ~ 45 degrees within pain free range. Able to lift the LLE up from the bed with Min A to  remove pillows.   Strength (Manual Muscle Testing)   Strength Comments Min A SLR on the L, independent on the R.   Bed Mobility   Comment, (Bed Mobility) Sup>sit Max A, preference to attempt to roll ;however, pt then rotates the R hip and is in pain-edu on long sit via pivot. See Rx   Transfers   Comment, (Transfers) Squat pivot bed>wc, Min A x 2. One for balance, second assist to stabilize wc (brakes engaged, but chair still slides a bit on the tile floor).   Gait/Stairs (Locomotion)   Comment, (Gait/Stairs) Pt is non ambulatory, uses his wc-proplesl with feet and hands, demonstrates abitliy in room 15' plus turning for optimal positioning in room.   Balance   Balance Comments sitting balance inially Mod A to maintain secondary to pain on the L hip.   Sensory Examination   Sensory Perception Comments Some decreased sensation tolight touch, states he can still feel the touch, but limited.   Clinical Impression   Criteria for Skilled Therapeutic Intervention Yes, treatment indicated   PT Diagnosis (PT) Impaired transfers   Influenced by the following impairments Pain/discomfort, post op weakness and fatigue, no active abduction, decreased balance and activity tolerance.   Functional limitations due to impairments Decreased functional independence with mobility   Clinical Presentation (PT Evaluation Complexity) stable   Clinical Presentation Rationale see MR   Clinical Decision Making (Complexity) low complexity   Planned Therapy Interventions (PT) balance training;bed mobility training;gait training;home exercise program;patient/family education;ROM (range of motion);stair training;strengthening;stretching;transfer training;progressive activity/exercise;risk factor education;home program  "guidelines;wheelchair management/propulsion training   Risk & Benefits of therapy have been explained evaluation/treatment results reviewed;care plan/treatment goals reviewed;risks/benefits reviewed;current/potential barriers reviewed;participants voiced agreement with care plan;participants included;patient   PT Total Evaluation Time   PT Eval, Low Complexity Minutes (49867) 10   Physical Therapy Goals   PT Frequency Daily   PT Predicted Duration/Target Date for Goal Attainment 10/08/24   PT Goals Bed Mobility;Transfers;Wheelchair Mobility;PT Goal 1   PT: Bed Mobility Minimal assist;Supine to/from sit;Within precautions  (YELENA prec, no abduction on the L)   PT: Wheelchair Mobility Greater than 500 feet;Caregiver SBA;manual wheelchair   PT: Goal 1 Pt will complete squat pivot transfers bed<>chair consistently with CGA or less, maintaining L YELENA precautions.   Interventions   Interventions Quick Adds Therapeutic Activity;Therapeutic Procedure   Therapeutic Procedure/Exercise   Ther. Procedure: strength, endurance, ROM, flexibillity Minutes (30623) 10   Treatment Detail/Skilled Intervention Supine LE AROM and AAROM for cirulatoin and reduced stiffness: heel slides, APs x 10-15 reaps, heel slides with use of packaged wipes to reduce friction and mprove ROM -Min A. Max verbal cues for breathing and slowing pace. Enocuraged to \"pull\" just a little further with each rep to imrpove hip flexion ROM on the L. Setaed in wc pt compelted heel/toe lifts LaQs and mini march x 10 repse each. Enocuraged use of AROM outside of reb.   Therapeutic Activity   Therapeutic Activities: dynamic activities to improve functional performance Minutes (22633) 25   Treatment Detail/Skilled Intervention Dangled EOB x 6 min, skilled VS assessment pre,during and post activity. Pt with initally soft, then high pressures. Pt able to lead PT to set up the wc as he would normally transfer, places wc to the L of his body, stands and pivots to the " ian-pt does adduct the hip a bit twiht the transfer-Min A x 2. Up in wc pt able to manage wc and repark for improved set up. Able to wash face, comb hair and brush teeth up in the wc with set up and good sequencing. Up in wc, all needs in reach, alarm on and BP stable at PT exit. RN updated via [email or web address] secondary to being busy with another pt. Skilled monitoring of BP provided pre,during and post time up for optimal safety as pt tx to ICU for BP management post op.   PT Discharge Planning   PT Plan Consecutive stand/squat pivots for technique and strength benefits.   PT Discharge Recommendation (DC Rec) Transitional Care Facility   PT Rationale for DC Rec Pt reports he can not take care of himself in his current state. States he plans to go to [facility]. Reports he has been there before and would like to go back. Pt will benefit from skilled rehab services in a TCU setting for optimal safety and indepenence with mobility prior to rturn to his VALERIE,   PT Brief overview of current status Min A x 2 squat pivot bed<>wc. Goals of therapy will be to address safe mobility and make recs for d/c to next level of care. Pt and RN will continue to follow all falls risk precautions as documented by RN staff while hospitalized.   Total Session Time   Timed Code Treatment Minutes 35   Total Session Time (sum of timed and untimed services) 45

## 2024-10-02 NOTE — PLAN OF CARE
Problem: Fall Injury Risk  Goal: Absence of Fall and Fall-Related Injury  Intervention: Promote Injury-Free Environment  Flowsheets  Taken 10/2/2024 1440  Safety Promotion/Fall Prevention:   activity supervised   assistive device/personal items within reach   clutter free environment maintained   lighting adjusted   nonskid shoes/slippers when out of bed   patient and family education   safety round/check completed   supervised activity   treat reversible contributory factors   treat underlying cause  Taken 10/2/2024 1200  Safety Promotion/Fall Prevention:   activity supervised   clutter free environment maintained   lighting adjusted   nonskid shoes/slippers when out of bed   patient and family education   supervised activity   safety round/check completed   treat reversible contributory factors   treat underlying cause  Taken 10/2/2024 0800  Safety Promotion/Fall Prevention:   activity supervised   clutter free environment maintained   lighting adjusted   nonskid shoes/slippers when out of bed   patient and family education   supervised activity   safety round/check completed   treat reversible contributory factors   treat underlying cause   Goal Outcome Evaluation:  As noted above and charted in flowsheets, interventions completed to promote an injury-free environment. Pt did not have a fall during this shift. Goal is progressing.

## 2024-10-02 NOTE — OR NURSING
Dr Abraham notified Troponin 27. No further orders. Pt ready to transfer to ICU.   RLE/weight-bearing as tolerated

## 2024-10-02 NOTE — PHARMACY-ADMISSION MEDICATION HISTORY
Pharmacist Admission Medication History    Admission medication history is complete. The information provided in this note is only as accurate as the sources available at the time of the update.    Information Source(s): Patient and Facility (TCU/NH/) medication list/MAR via in-person and phone    Pertinent Information: Previous completed by RN and med scribnancy but found to have some inaccuracies    Changes made to PTA medication list:  Added: None  Deleted: Lantus, Brimonidine and Brimonidine -Timolol  Changed: Nystatin to PRN    Allergies reviewed with patient and updates made in EHR: no    Medication History Completed By: Eriberto Simmons [facility] 10/2/2024 1:42 PM    PTA Med List   Medication Sig Last Dose    acetaminophen (TYLENOL) 500 MG tablet Take 500 mg by mouth 3 times daily Past Week    aspirin 81 MG EC tablet Take 81 mg by mouth daily.  at 0900    bisacodyl (DULCOLAX) 10 MG suppository Place 10 mg rectally daily as needed for constipation.  at PRN    DULoxetine (CYMBALTA) 30 MG capsule Take 30 mg by mouth 2 times daily. 0900 & 1900  at 0900    empagliflozin (JARDIANCE) 10 MG TABS tablet Take 10 mg by mouth daily.  at 0900    folic acid (FOLVITE) 1 MG tablet Take 1 mg by mouth daily.  at 0900    furosemide (LASIX) 20 MG tablet Take 10 mg by mouth daily. (0.0n47dh=98fl)  at 0900    latanoprost (XALATAN) 0.005 % ophthalmic solution Place 1 drop into both eyes at bedtime.  at HS    metFORMIN (GLUCOPHAGE) 500 MG tablet Take 500 mg by mouth daily (with breakfast).  at 0900    morphine (MSIR) 15 MG IR tablet 1 TABLET BY MOUTH THREE TIMES DAILY AS NEEDED (Patient taking differently: Take 15 mg by mouth every morning. And 1 additional tablet as needed during same 24 hour period.)  at 0900    nystatin (MYCOSTATIN) 278513 UNIT/GM external powder Apply topically 2 times daily as needed for other (Rash).  at PRN    polyethylene glycol (MIRALAX) 17 g packet Take 1 packet by mouth daily as needed for constipation  at 0900     sacubitril-valsartan (ENTRESTO) 24-26 MG per tablet Take 1 tablet by mouth 2 times daily. 0900 & 1900  at 0900    senna-docusate (SENOKOT-S/PERICOLACE) 8.6-50 MG tablet Take 2 tablets by mouth daily.  at PRN    tamsulosin (FLOMAX) 0.4 MG 24 hr capsule Take 0.4 mg by mouth every morning  at 0900    trolamine salicylate (ASPERCREME) 10 % external cream Apply topically as needed for moderate pain.  at PRN    vitamin D3 (CHOLECALCIFEROL) 50 mcg (2000 units) tablet Take 1 tablet by mouth daily.  at 0900

## 2024-10-03 LAB
ANION GAP SERPL CALCULATED.3IONS-SCNC: 7 MMOL/L (ref 7–15)
BUN SERPL-MCNC: 31 MG/DL (ref 8–23)
CALCIUM SERPL-MCNC: 9.1 MG/DL (ref 8.8–10.4)
CHLORIDE SERPL-SCNC: 106 MMOL/L (ref 98–107)
CREAT SERPL-MCNC: 1.12 MG/DL (ref 0.67–1.17)
EGFRCR SERPLBLD CKD-EPI 2021: 66 ML/MIN/1.73M2
ERYTHROCYTE [DISTWIDTH] IN BLOOD BY AUTOMATED COUNT: 13.1 % (ref 10–15)
GLUCOSE BLDC GLUCOMTR-MCNC: 147 MG/DL (ref 70–99)
GLUCOSE BLDC GLUCOMTR-MCNC: 150 MG/DL (ref 70–99)
GLUCOSE BLDC GLUCOMTR-MCNC: 151 MG/DL (ref 70–99)
GLUCOSE BLDC GLUCOMTR-MCNC: 268 MG/DL (ref 70–99)
GLUCOSE SERPL-MCNC: 141 MG/DL (ref 70–99)
GLUCOSE SERPL-MCNC: 141 MG/DL (ref 70–99)
HCO3 SERPL-SCNC: 24 MMOL/L (ref 22–29)
HCT VFR BLD AUTO: 33.7 % (ref 40–53)
HGB BLD-MCNC: 11.2 G/DL (ref 13.3–17.7)
MAGNESIUM SERPL-MCNC: 2.2 MG/DL (ref 1.7–2.3)
MAGNESIUM SERPL-MCNC: 2.2 MG/DL (ref 1.7–2.3)
MCH RBC QN AUTO: 33.5 PG (ref 26.5–33)
MCHC RBC AUTO-ENTMCNC: 33.2 G/DL (ref 31.5–36.5)
MCV RBC AUTO: 101 FL (ref 78–100)
PHOSPHATE SERPL-MCNC: 1.7 MG/DL (ref 2.5–4.5)
PHOSPHATE SERPL-MCNC: 2.4 MG/DL (ref 2.5–4.5)
PLATELET # BLD AUTO: 122 10E3/UL (ref 150–450)
POTASSIUM SERPL-SCNC: 5 MMOL/L (ref 3.4–5.3)
RBC # BLD AUTO: 3.34 10E6/UL (ref 4.4–5.9)
SODIUM SERPL-SCNC: 137 MMOL/L (ref 135–145)
TROPONIN T SERPL HS-MCNC: 115 NG/L
TROPONIN T SERPL HS-MCNC: 119 NG/L
TROPONIN T SERPL HS-MCNC: 150 NG/L
WBC # BLD AUTO: 8.2 10E3/UL (ref 4–11)

## 2024-10-03 PROCEDURE — 250N000013 HC RX MED GY IP 250 OP 250 PS 637: Performed by: INTERNAL MEDICINE

## 2024-10-03 PROCEDURE — 80048 BASIC METABOLIC PNL TOTAL CA: CPT | Performed by: INTERNAL MEDICINE

## 2024-10-03 PROCEDURE — 120N000001 HC R&B MED SURG/OB

## 2024-10-03 PROCEDURE — 84484 ASSAY OF TROPONIN QUANT: CPT | Performed by: INTERNAL MEDICINE

## 2024-10-03 PROCEDURE — 36415 COLL VENOUS BLD VENIPUNCTURE: CPT | Performed by: INTERNAL MEDICINE

## 2024-10-03 PROCEDURE — 83735 ASSAY OF MAGNESIUM: CPT | Performed by: INTERNAL MEDICINE

## 2024-10-03 PROCEDURE — 250N000011 HC RX IP 250 OP 636: Performed by: INTERNAL MEDICINE

## 2024-10-03 PROCEDURE — 85018 HEMOGLOBIN: CPT | Performed by: INTERNAL MEDICINE

## 2024-10-03 PROCEDURE — 84100 ASSAY OF PHOSPHORUS: CPT | Performed by: INTERNAL MEDICINE

## 2024-10-03 PROCEDURE — 99232 SBSQ HOSP IP/OBS MODERATE 35: CPT | Performed by: INTERNAL MEDICINE

## 2024-10-03 RX ORDER — ACETAMINOPHEN 325 MG/1
650 TABLET ORAL EVERY 4 HOURS PRN
Qty: 100 TABLET | Refills: 0 | Status: SHIPPED | OUTPATIENT
Start: 2024-10-03 | End: 2024-10-07

## 2024-10-03 RX ORDER — HYDROXYZINE HYDROCHLORIDE 10 MG/1
10 TABLET, FILM COATED ORAL EVERY 6 HOURS PRN
Qty: 30 TABLET | Refills: 0 | Status: SHIPPED | OUTPATIENT
Start: 2024-10-03

## 2024-10-03 RX ORDER — ONDANSETRON 4 MG/1
4 TABLET, ORALLY DISINTEGRATING ORAL EVERY 6 HOURS PRN
Qty: 30 TABLET | Refills: 0 | Status: SHIPPED | OUTPATIENT
Start: 2024-10-03

## 2024-10-03 RX ORDER — HYDROMORPHONE HYDROCHLORIDE 2 MG/1
2-4 TABLET ORAL EVERY 4 HOURS PRN
Qty: 26 TABLET | Refills: 0 | Status: SHIPPED | OUTPATIENT
Start: 2024-10-03

## 2024-10-03 RX ORDER — ENOXAPARIN SODIUM 100 MG/ML
40 INJECTION SUBCUTANEOUS EVERY 24 HOURS
Qty: 5.6 ML | Refills: 0 | Status: SHIPPED | OUTPATIENT
Start: 2024-10-03 | End: 2024-10-17

## 2024-10-03 RX ADMIN — SENNOSIDES AND DOCUSATE SODIUM 1 TABLET: 50; 8.6 TABLET ORAL at 21:59

## 2024-10-03 RX ADMIN — HYDROMORPHONE HYDROCHLORIDE 4 MG: 2 TABLET ORAL at 11:33

## 2024-10-03 RX ADMIN — ACETAMINOPHEN 975 MG: 325 TABLET ORAL at 21:58

## 2024-10-03 RX ADMIN — ACETAMINOPHEN 975 MG: 325 TABLET ORAL at 05:41

## 2024-10-03 RX ADMIN — ACETAMINOPHEN 975 MG: 325 TABLET ORAL at 14:35

## 2024-10-03 RX ADMIN — MICONAZOLE NITRATE: 2 POWDER TOPICAL at 08:25

## 2024-10-03 RX ADMIN — POTASSIUM & SODIUM PHOSPHATES POWDER PACK 280-160-250 MG 2 PACKET: 280-160-250 PACK at 21:58

## 2024-10-03 RX ADMIN — POTASSIUM & SODIUM PHOSPHATES POWDER PACK 280-160-250 MG 2 PACKET: 280-160-250 PACK at 18:12

## 2024-10-03 RX ADMIN — LATANOPROST 1 DROP: 50 SOLUTION OPHTHALMIC at 21:59

## 2024-10-03 RX ADMIN — DULOXETINE HYDROCHLORIDE 30 MG: 30 CAPSULE, DELAYED RELEASE ORAL at 21:58

## 2024-10-03 RX ADMIN — Medication 50 MCG: at 08:24

## 2024-10-03 RX ADMIN — ENOXAPARIN SODIUM 40 MG: 40 INJECTION SUBCUTANEOUS at 13:01

## 2024-10-03 RX ADMIN — POTASSIUM & SODIUM PHOSPHATES POWDER PACK 280-160-250 MG 2 PACKET: 280-160-250 PACK at 14:35

## 2024-10-03 RX ADMIN — MICONAZOLE NITRATE: 2 POWDER TOPICAL at 21:58

## 2024-10-03 RX ADMIN — FOLIC ACID 1 MG: 1 TABLET ORAL at 08:23

## 2024-10-03 RX ADMIN — HYDROMORPHONE HYDROCHLORIDE 4 MG: 2 TABLET ORAL at 05:19

## 2024-10-03 RX ADMIN — HYDROMORPHONE HYDROCHLORIDE 2 MG: 2 TABLET ORAL at 18:49

## 2024-10-03 RX ADMIN — DULOXETINE HYDROCHLORIDE 30 MG: 30 CAPSULE, DELAYED RELEASE ORAL at 08:24

## 2024-10-03 ASSESSMENT — ACTIVITIES OF DAILY LIVING (ADL)
ADLS_ACUITY_SCORE: 41

## 2024-10-03 NOTE — PROGRESS NOTES
"Chris Bhakta  10/3/2024  POD # 2 s/p Removal of left IMN and Total hip replacement with revision stem.   Admit Date:  10/1/2024      Doing well.  Clean wound without signs of infection.  Normal healing wound.  No excessive bleeding  Pain well-controlled.  Objective: patient denies being in pain. He denies shortness of breath or chest pain.   Blood pressure 120/67, pulse 94, temperature 98  F (36.7  C), temperature source Oral, resp. rate 16, height 1.72 m (5' 7.72\"), weight 85.1 kg (187 lb 9.8 oz), SpO2 96%.    Temperatures:  Current - Temp: 99.6  F (37.6  C); Max - Temp  Av.5  F (36.9  C)  Min: 96.9  F (36.1  C)  Max: 99.6  F (37.6  C)  Pulse range: Pulse  Av.7  Min: 70  Max: 121  Blood pressure range: Systolic (24hrs), Av , Min:56 , Max:137   ; Diastolic (24hrs), Av, Min:30, Max:70    Exam:  CMS: intact  alert, stable, wound ok  Dressing c/d/I  Calf s/nt  DF/PF   Communicates clearly with examiner.     Labs:  Recent Labs   Lab Test 10/02/24  0540 10/01/24  2242 10/01/24  1014   POTASSIUM 5.3 4.7 4.6     Recent Labs   Lab Test 10/02/24  0540 10/01/24  2242 10/01/24  1846   HGB 11.4* 12.2* 11.9*     Recent Labs   Lab Test 10/01/24  2242 09/26/16  1402   INR 1.24* 1.08     Recent Labs   Lab Test 10/02/24  0540 10/01/24  2242 09/27/16  1705   * 165 156       PLAN: WBAT in the LLE. No hip abduction. Will continue to follow closely. We appreciate the assistance in the care of this patient. On lovenox for dvt ppx now. Anticipate discharge to rehab facility where he lives today on 10/3 if stable.     "

## 2024-10-03 NOTE — PROGRESS NOTES
Occupational Therapy: Orders received. Chart reviewed and discussed with care team.? Pt admitted due to left total hip arthroplasty. Pt developed post operative shock. Per chart and PT, plan is for pt to discharge to TCU at discharge. Will defer inpt OT to TCU and complete inpt OT orders. Defer discharge recommendations to medical team.?OT orders completed.

## 2024-10-03 NOTE — PLAN OF CARE
Goal Outcome Evaluation:        Diagnosis: LEFT TOTAL HIP ARTHROPLASTY  LEFT FEMORAL INTRAMEDULARY NAIL REMOVAL  ICU transfer 10/2/24    POD#:1  Mental Status: A & O x 4  Activity/dangle: Up with 2, gait belt and sera steady  Diet:regular  Pain: po dilaudid  Diaz/Voiding:Diaz  Tele/Restraints/Iso:NA  02/LDA:LIZETH KINNEY  D/C Date:Pending  Other Info:Dressing is CDI, VSS.

## 2024-10-03 NOTE — PROGRESS NOTES
Diagnosis: Left total hip Arthroplasty, Left Femoral Intramedulary Nail removal   POD#:2  Mental Status: A&O x4  Activity/dangle Up with assist of 2 with Jodie Steady  Diet:Regular  Pain: PRN Dilaudid  Diaz/Voiding:Diaz  Tele/Restraints/Iso: NA  02/LDA: RACH STANLEY  D/C Date: Pending  Other Info: CMS intact. Dressing CDI

## 2024-10-03 NOTE — CONSULTS
Care Management Initial Consult    General Information  Assessment completed with: Patient, Family, Stig and daughter Pushpa on the phone  Type of CM/SW Visit: Offer D/C Planning    Primary Care Provider verified and updated as needed: Yes   Readmission within the last 30 days:        Reason for Consult: discharge planning  Advance Care Planning:            Communication Assessment  Patient's communication style: spoken language (English or Bilingual)    Hearing Difficulty or Deaf: no   Wear Glasses or Blind: no    Cognitive  Cognitive/Neuro/Behavioral: WDL  Level of Consciousness: alert  Arousal Level: opens eyes spontaneously  Orientation: oriented x 4     Best Language: 0 - No aphasia  Speech: clear    Living Environment:   People in home: alone, facility resident     Current living Arrangements: assisted living  [facility] of Facility: [facility]   Able to return to prior arrangements: yes       Family/Social Support:  Care provided by: self, child(sendy), homecare agency  Provides care for: no one     Support system: Children          Description of Support System: Supportive, Involved         Current Resources:   Patient receiving home care services:          Community Resources:    Equipment currently used at home: wheelchair, manual  Supplies currently used at home:      Employment/Financial:  Employment Status: retired        Financial Concerns:             Does the patient's insurance plan have a 3 day qualifying hospital stay waiver?  Yes     Which insurance plan 3 day waiver is available? Alternative insurance waiver    Will the waiver be used for post-acute placement? No    Lifestyle & Psychosocial Needs:  Social Determinants of Health     Food Insecurity: No Food Insecurity (10/6/2023)    Received from [facility]    Hunger Vital Sign     Worried About Running Out of Food in the Last Year: Never true     Ran Out of Food in the Last Year: Never true   Depression: Not at risk (3/19/2021)    Received  from [facility]    PHQ-2     PHQ-2 Score: 2   Housing Stability: Not on file   Tobacco Use: Medium Risk (10/1/2024)    Patient History     Smoking Tobacco Use: Former     Smokeless Tobacco Use: Never     Passive Exposure: Not on file   Financial Resource Strain: Not on file   Alcohol Use: Not on file   Transportation Needs: Not on file   Physical Activity: Not on file   Interpersonal Safety: Low Risk  (10/1/2024)    Interpersonal Safety     Do you feel physically and emotionally safe where you currently live?: Yes     Within the past 12 months, have you been hit, slapped, kicked or otherwise physically hurt by someone?: No     Within the past 12 months, have you been humiliated or emotionally abused in other ways by your partner or ex-partner?: No   Stress: Not on file   Social Connections: Not on file   Health Literacy: Not on file       Functional Status:  Prior to admission patient needed assistance:              Mental Health Status:          Chemical Dependency Status:                Values/Beliefs:  Spiritual, Cultural Beliefs, Cheondoism Practices, Values that affect care:                 Discussed  Partnership in Safe Discharge Planning  document with patient/family: Yes:     Additional Information:  Consult for discharge planning. Patient admitted for revision of total replacement of left hip joint on 10/1/24 with tentative discharge date of 10/4/24. Writer reviewed chart and TCU recommendations at discharge.     Writer met with patient to complete consult bedside and introduced self and role. Patient is aware that recommendations are TCU and are in agreement. After discussing options for TCU and provided list, patient would like referrals sent to [facility]. [facility] offered a bed tomorrow after 1300.    He also asked that writer reach out to his daughter Pushpa to discuss discharge as she has questions. At baseline patient lives at [facility] and receives the highest level of care. Pushpa has  concerns about the care he receives there. She said that prior to planned surgery, patient was not given the surgical prepping bathes before. She said that he has not had a proper bath since 9/23/24. He has orders at his senior living indicating that if he refuses a bath, the staff is supposed to call family as they can get involved and they are not following that. She said that he came to the hospital with sores and feces on his body and when she visits him there is feces on the walls and sink in the bathroom from him trying to clean himself up. Patient is on an Elderly waiver. Writer asked if family is working with his waiver  for new placement and she said that her brother was working with that person.     Writer confirmed patient's primary doctor is Dr. Rodriguez and confirmed home address and patient contacts from facesheet.     Writer discussed private room and cost associated vs shared room and patient would like shared room due to cost. Patient aware that cost of private room is $45/day. Referrals sent via DOD.     Writer discussed transportation options and possible out of pocket costs of both wheelchair and stretcher with patient. Patient would like to transport set up with [facility] at discharge. Call to [facility] and wheelchair ride scheduled for 10/4/24 between [id number].      Next Steps:  update daughter tomorrow of ride time.      ALEX House

## 2024-10-03 NOTE — PLAN OF CARE
Goal Outcome Evaluation:      Diagnosis:LEFT TOTAL HIP ARTHROPLASTY  LEFT FEMORAL INTRAMEDULARY NAIL REMOVAL  ICU transfer 10/2/24  POD#:2  Mental Status: A & Ox 4  Activity/dangle Up with 2, gait belt, sera steady  Diet:Regular  Pain:po dilaudid  Willis/Voiding:Due to void, willis discharge @ 5p  Tele/Restraints/Iso:NA  02/LDA:LIZETH Baker  D/C Date:Pending tcu   Other Info:Dressing is CDI, BG covered per order.

## 2024-10-03 NOTE — PROGRESS NOTES
[facility]    Medicine Progress Note - Hospitalist Service    Date of Admission:  10/1/2024    Assessment & Plan   78-year-old male patient with past medical history significant for CAD, CABG in 2009, heart failure, CKD stage III, and hypertension. The patient was admitted to the intensive care unit for postoperative shock after left total hip arthroplasty that was done on elective basis.  Pt was stable and transferred out of ICU on 10/2,hospital ist was given sign out for care transfer to floor.    Left total hip arthroplasty  Post operative hypotension/shock requiring IV pressor and ICU  -post op significant hypotension requiring transfer to ICU and placed on IV pressor therapy  -has been weaned off pressor therapy prior to transfer out of ICU,able to tolerate PO intake and vitals stable.  -post op cares per orthopedic surgery  -PT/OT  -subcutaneous lovenox started for DVT Ppx  -Aspirin currently on hold      Acute anemia:  -Hemoglobin: 11.4  -continue to monitor closely after starting lovenox  -currently stable    EBONI ON CKD:   -Resolved, continue to encourage oral intake  -Likely due to hypotension postoperatively requiring IV pressor therapy and IV fluids  -Creatinine improved to 1.12 on 10/3    Essential hypertension  -Patient on Entresto, Lasix PTA  -Due to postoperative hypotension requiring IV pressor therapy all antihypertensive agents currently on hold  -Recommend to hold both Entresto and Lasix until PCP follow-up at discharge.    Type II MI 2 to demand ischemia:  -Troponin elevation persistent,no symptoms at this time  -EKG NG reviewed   -ECHO: No RWMA  -Troponin up trended, max 166, currently down to 119.  Patient without any symptoms, low concern for ACS.    DM type 2:  Patient received sliding scale insulin during hospital stay  -Continue outpatient regimen at discharge    Hypophosphatemia  -Phosphorus low at 2.4 requiring aggressive supplementation  -If phosphorus continues  "to remain low will need outpatient supplementation.          Diet: Room Service  Regular Diet Adult  Diet    DVT Prophylaxis: Enoxaparin (Lovenox) SQ  Diaz Catheter: PRESENT, indication: ICU only: hourly urine output needed for patient care (pt was transfer)  Lines: None     Cardiac Monitoring: None  Code Status: No CPR- Do NOT Intubate      Clinically Significant Risk Factors             # Anion Gap Metabolic Acidosis: Highest Anion Gap = 20 mmol/L in last 2 days, will monitor and treat as appropriate   # Coagulation Defect: INR = 1.24 (Ref range: 0.85 - 1.15) and/or PTT = 31 Seconds (Ref range: 22 - 38 Seconds), will monitor for bleeding  # Thrombocytopenia: Lowest platelets = 122 in last 2 days, will monitor for bleeding    # Heart failure with preserved ejection fraction: EF >50% and home medication list includes sacubitril/valsartan (Entresto)          # Overweight: Estimated body mass index is 28.77 kg/m  as calculated from the following:    Height as of this encounter: 1.72 m (5' 7.72\").    Weight as of this encounter: 85.1 kg (187 lb 9.8 oz)., PRESENT ON ADMISSION            Disposition Plan     Medically Ready for Discharge: Ready Now             Mel Soriano MD  Hospitalist Service  [facility]  Securely message with [employer] (more info)  Text page via [employer] Paging/Directory   ______________________________________________________________________    Interval History   Patient is sleeping this morning, denies any chest pain dizziness lightheadedness nausea or vomiting.  Continues to have mild discomfort in the hip especially with walking.  Otherwise no significant overnight events reported or charted.    Physical Exam   Vital Signs: Temp: 98  F (36.7  C) Temp src: Oral BP: 120/67 Pulse: 94   Resp: 16 SpO2: 96 % O2 Device: None (Room air)    Weight: 187 lbs 9.78 oz    Constitutional: Sleeping no apparent distress  Respiratory: Clear to auscultation bilaterally, no crackles or " wheezing  Cardiovascular: Regular rate and rhythm, normal S1 and S2, and no murmur noted  GI: Normal bowel sounds, soft, non-distended, non-tender      Medical Decision Making

## 2024-10-04 LAB
ALBUMIN UR-MCNC: 10 MG/DL
ANION GAP SERPL CALCULATED.3IONS-SCNC: 14 MMOL/L (ref 7–15)
APPEARANCE UR: CLEAR
ATRIAL RATE - MUSE: 109 BPM
ATRIAL RATE - MUSE: 77 BPM
ATRIAL RATE - MUSE: 90 BPM
BILIRUB UR QL STRIP: NEGATIVE
BUN SERPL-MCNC: 22.6 MG/DL (ref 8–23)
CALCIUM SERPL-MCNC: 9.6 MG/DL (ref 8.8–10.4)
CHLORIDE SERPL-SCNC: 101 MMOL/L (ref 98–107)
COLOR UR AUTO: YELLOW
CREAT SERPL-MCNC: 0.86 MG/DL (ref 0.67–1.17)
DIASTOLIC BLOOD PRESSURE - MUSE: NORMAL MMHG
EGFRCR SERPLBLD CKD-EPI 2021: 88 ML/MIN/1.73M2
ERYTHROCYTE [DISTWIDTH] IN BLOOD BY AUTOMATED COUNT: 12.6 % (ref 10–15)
GLUCOSE BLDC GLUCOMTR-MCNC: 124 MG/DL (ref 70–99)
GLUCOSE BLDC GLUCOMTR-MCNC: 137 MG/DL (ref 70–99)
GLUCOSE BLDC GLUCOMTR-MCNC: 141 MG/DL (ref 70–99)
GLUCOSE BLDC GLUCOMTR-MCNC: 144 MG/DL (ref 70–99)
GLUCOSE BLDC GLUCOMTR-MCNC: 149 MG/DL (ref 70–99)
GLUCOSE BLDC GLUCOMTR-MCNC: 151 MG/DL (ref 70–99)
GLUCOSE SERPL-MCNC: 138 MG/DL (ref 70–99)
GLUCOSE UR STRIP-MCNC: >=1000 MG/DL
HCO3 SERPL-SCNC: 22 MMOL/L (ref 22–29)
HCT VFR BLD AUTO: 40.4 % (ref 40–53)
HGB BLD-MCNC: 13.6 G/DL (ref 13.3–17.7)
HGB UR QL STRIP: ABNORMAL
INTERPRETATION ECG - MUSE: NORMAL
KETONES UR STRIP-MCNC: 20 MG/DL
LEUKOCYTE ESTERASE UR QL STRIP: NEGATIVE
MAGNESIUM SERPL-MCNC: 2.1 MG/DL (ref 1.7–2.3)
MCH RBC QN AUTO: 33.4 PG (ref 26.5–33)
MCHC RBC AUTO-ENTMCNC: 33.7 G/DL (ref 31.5–36.5)
MCV RBC AUTO: 99 FL (ref 78–100)
NITRATE UR QL: NEGATIVE
P AXIS - MUSE: 63 DEGREES
P AXIS - MUSE: 95 DEGREES
P AXIS - MUSE: NORMAL DEGREES
PH UR STRIP: 6.5 [PH] (ref 5–7)
PHOSPHATE SERPL-MCNC: 2.3 MG/DL (ref 2.5–4.5)
PHOSPHATE SERPL-MCNC: 3.4 MG/DL (ref 2.5–4.5)
PLATELET # BLD AUTO: 153 10E3/UL (ref 150–450)
POTASSIUM SERPL-SCNC: 5.1 MMOL/L (ref 3.4–5.3)
PR INTERVAL - MUSE: 278 MS
PR INTERVAL - MUSE: NORMAL MS
PR INTERVAL - MUSE: NORMAL MS
QRS DURATION - MUSE: 136 MS
QT - MUSE: 368 MS
QT - MUSE: 380 MS
QT - MUSE: 414 MS
QTC - MUSE: 429 MS
QTC - MUSE: 430 MS
QTC - MUSE: 506 MS
R AXIS - MUSE: 10 DEGREES
R AXIS - MUSE: 68 DEGREES
R AXIS - MUSE: 73 DEGREES
RBC # BLD AUTO: 4.07 10E6/UL (ref 4.4–5.9)
RBC URINE: 2 /HPF
SODIUM SERPL-SCNC: 137 MMOL/L (ref 135–145)
SP GR UR STRIP: 1.02 (ref 1–1.03)
SYSTOLIC BLOOD PRESSURE - MUSE: NORMAL MMHG
T AXIS - MUSE: 117 DEGREES
T AXIS - MUSE: 262 DEGREES
T AXIS - MUSE: 93 DEGREES
TROPONIN T SERPL HS-MCNC: 141 NG/L
TROPONIN T SERPL HS-MCNC: 180 NG/L
UROBILINOGEN UR STRIP-MCNC: 2 MG/DL
VENTRICULAR RATE- MUSE: 77 BPM
VENTRICULAR RATE- MUSE: 82 BPM
VENTRICULAR RATE- MUSE: 90 BPM
WBC # BLD AUTO: 10.5 10E3/UL (ref 4–11)
WBC URINE: 3 /HPF

## 2024-10-04 PROCEDURE — 83735 ASSAY OF MAGNESIUM: CPT | Performed by: INTERNAL MEDICINE

## 2024-10-04 PROCEDURE — 120N000001 HC R&B MED SURG/OB

## 2024-10-04 PROCEDURE — 250N000011 HC RX IP 250 OP 636: Performed by: INTERNAL MEDICINE

## 2024-10-04 PROCEDURE — 250N000013 HC RX MED GY IP 250 OP 250 PS 637: Performed by: INTERNAL MEDICINE

## 2024-10-04 PROCEDURE — 36415 COLL VENOUS BLD VENIPUNCTURE: CPT | Performed by: INTERNAL MEDICINE

## 2024-10-04 PROCEDURE — 84484 ASSAY OF TROPONIN QUANT: CPT | Performed by: INTERNAL MEDICINE

## 2024-10-04 PROCEDURE — 99232 SBSQ HOSP IP/OBS MODERATE 35: CPT | Performed by: INTERNAL MEDICINE

## 2024-10-04 PROCEDURE — 250N000013 HC RX MED GY IP 250 OP 250 PS 637: Performed by: ORTHOPAEDIC SURGERY

## 2024-10-04 PROCEDURE — 99222 1ST HOSP IP/OBS MODERATE 55: CPT | Performed by: INTERNAL MEDICINE

## 2024-10-04 PROCEDURE — 85014 HEMATOCRIT: CPT | Performed by: INTERNAL MEDICINE

## 2024-10-04 PROCEDURE — 80048 BASIC METABOLIC PNL TOTAL CA: CPT | Performed by: INTERNAL MEDICINE

## 2024-10-04 PROCEDURE — 84100 ASSAY OF PHOSPHORUS: CPT | Performed by: INTERNAL MEDICINE

## 2024-10-04 PROCEDURE — 84100 ASSAY OF PHOSPHORUS: CPT | Performed by: ORTHOPAEDIC SURGERY

## 2024-10-04 PROCEDURE — 81003 URINALYSIS AUTO W/O SCOPE: CPT | Performed by: PHYSICIAN ASSISTANT

## 2024-10-04 RX ORDER — CARVEDILOL 12.5 MG/1
12.5 TABLET ORAL 2 TIMES DAILY WITH MEALS
Status: DISCONTINUED | OUTPATIENT
Start: 2024-10-04 | End: 2024-10-05 | Stop reason: HOSPADM

## 2024-10-04 RX ADMIN — ACETAMINOPHEN 975 MG: 325 TABLET ORAL at 15:57

## 2024-10-04 RX ADMIN — ENOXAPARIN SODIUM 40 MG: 40 INJECTION SUBCUTANEOUS at 11:21

## 2024-10-04 RX ADMIN — LATANOPROST 1 DROP: 50 SOLUTION OPHTHALMIC at 22:10

## 2024-10-04 RX ADMIN — POTASSIUM & SODIUM PHOSPHATES POWDER PACK 280-160-250 MG 1 PACKET: 280-160-250 PACK at 07:04

## 2024-10-04 RX ADMIN — MICONAZOLE NITRATE: 2 POWDER TOPICAL at 22:10

## 2024-10-04 RX ADMIN — Medication 50 MCG: at 08:10

## 2024-10-04 RX ADMIN — SENNOSIDES AND DOCUSATE SODIUM 1 TABLET: 50; 8.6 TABLET ORAL at 22:10

## 2024-10-04 RX ADMIN — HYDROMORPHONE HYDROCHLORIDE 4 MG: 2 TABLET ORAL at 06:16

## 2024-10-04 RX ADMIN — DULOXETINE HYDROCHLORIDE 30 MG: 30 CAPSULE, DELAYED RELEASE ORAL at 22:10

## 2024-10-04 RX ADMIN — POTASSIUM & SODIUM PHOSPHATES POWDER PACK 280-160-250 MG 1 PACKET: 280-160-250 PACK at 11:21

## 2024-10-04 RX ADMIN — CARVEDILOL 12.5 MG: 12.5 TABLET, FILM COATED ORAL at 19:01

## 2024-10-04 RX ADMIN — DULOXETINE HYDROCHLORIDE 30 MG: 30 CAPSULE, DELAYED RELEASE ORAL at 08:10

## 2024-10-04 RX ADMIN — FOLIC ACID 1 MG: 1 TABLET ORAL at 08:10

## 2024-10-04 RX ADMIN — POTASSIUM & SODIUM PHOSPHATES POWDER PACK 280-160-250 MG 1 PACKET: 280-160-250 PACK at 15:57

## 2024-10-04 RX ADMIN — MICONAZOLE NITRATE: 2 POWDER TOPICAL at 08:12

## 2024-10-04 ASSESSMENT — ACTIVITIES OF DAILY LIVING (ADL)
ADLS_ACUITY_SCORE: 38
ADLS_ACUITY_SCORE: 39
ADLS_ACUITY_SCORE: 38
ADLS_ACUITY_SCORE: 36
ADLS_ACUITY_SCORE: 35
ADLS_ACUITY_SCORE: 36
ADLS_ACUITY_SCORE: 38
ADLS_ACUITY_SCORE: 38
ADLS_ACUITY_SCORE: 39
ADLS_ACUITY_SCORE: 41
ADLS_ACUITY_SCORE: 39
ADLS_ACUITY_SCORE: 39
ADLS_ACUITY_SCORE: 36
ADLS_ACUITY_SCORE: 36
ADLS_ACUITY_SCORE: 39
ADLS_ACUITY_SCORE: 36
ADLS_ACUITY_SCORE: 38
ADLS_ACUITY_SCORE: 41
ADLS_ACUITY_SCORE: 38
ADLS_ACUITY_SCORE: 35
ADLS_ACUITY_SCORE: 39
ADLS_ACUITY_SCORE: 36
ADLS_ACUITY_SCORE: 36

## 2024-10-04 NOTE — PROGRESS NOTES
"Chris Bhakta  10/3/2024  POD # 3 s/p Removal of left IMN and Total hip replacement with revision stem.   Admit Date:  10/1/2024      Doing well.  Clean wound without signs of infection.  Normal healing wound.  No excessive bleeding  Pain well-controlled.  Objective: patient denies being in pain. He denies shortness of breath or chest pain.   Blood pressure (!) 162/90, pulse 80, temperature 98.7  F (37.1  C), temperature source Oral, resp. rate 16, height 1.72 m (5' 7.72\"), weight 78.5 kg (173 lb 1 oz), SpO2 97%.    Temperatures:  Current - Temp: 99.6  F (37.6  C); Max - Temp  Av.5  F (36.9  C)  Min: 96.9  F (36.1  C)  Max: 99.6  F (37.6  C)  Pulse range: Pulse  Av.7  Min: 70  Max: 121  Blood pressure range: Systolic (24hrs), Av , Min:56 , Max:137   ; Diastolic (24hrs), Av, Min:30, Max:70    Exam:  CMS: intact  alert, stable, wound ok  Dressing c/d/I  Calf s/nt  DF/PF 5/  Communicates clearly with examiner.     Labs:  Recent Labs   Lab Test 10/02/24  0540 10/01/24  2242 10/01/24  1014   POTASSIUM 5.3 4.7 4.6     Recent Labs   Lab Test 10/02/24  0540 10/01/24  2242 10/01/24  1846   HGB 11.4* 12.2* 11.9*     Recent Labs   Lab Test 10/01/24  2242 09/26/16  1402   INR 1.24* 1.08     Recent Labs   Lab Test 10/02/24  0540 10/01/24  2242 09/27/16  1705   * 165 156       PLAN: WBAT in the LLE. No hip abduction. Will continue to follow closely. We appreciate the assistance in the care of this patient. On lovenox for dvt ppx now. Anticipate discharge to rehab facility where he lives once stable.    "

## 2024-10-04 NOTE — PLAN OF CARE
Goal Outcome Evaluation:      Plan of Care Reviewed With: patient    Overall Patient Progress: improvingOverall Patient Progress: improving    Patient vital signs are at baseline: Yes  Patient able to ambulate as they were prior to admission or with assist devices provided by therapies during their stay:  Yes  Patient MUST void prior to discharge:  Yes  Due to Void  Patient able to tolerate oral intake:  Yes  Pain has adequate pain control using Oral analgesics:  Yes  Does patient have an identified :  No,  Reason:  Plan to Discharge to [facility]  Has goal D/C date and time been discussed with patient:  No,  Reason:  Discharge to [facility] pending.    Dx:Left YELENA w/ Nail Removal  POD#3    A&Ox4 w/ Intermittent Confusion.  Family stated this is baseline for pt.  CMS Intact.   ABD removed this shift w/ Prineo dressing remaining.  VSS on RA.   Up with Ax2 GB and SarFavioy.   Due to Void. Straight Cath x1 w/ UA sent to Lab.    Order to place Diaz if 2nd Straight Cath required.  No PIV Access, 2x Removed during Shift by pt.  Regular Diet.  Pain controlled with Tylenol.

## 2024-10-04 NOTE — PROGRESS NOTES
Care Management Follow Up    Length of Stay (days): 3    Expected Discharge Date: 10/04/2024     Concerns to be Addressed:       Patient plan of care discussed at interdisciplinary rounds: Yes    Anticipated Discharge Disposition: Transitional Care, Skilled Nursing Facility              Anticipated Discharge Services: Transportation Services  Anticipated Discharge DME: None    Patient/family educated on Medicare website which has current facility and service quality ratings: yes  Education Provided on the Discharge Plan:    Patient/Family in Agreement with the Plan: yes    Referrals Placed by CM/SW: [facility], Post Acute Facilities, Transportation, Communication hand-offs to next level of Care Providers  Private pay costs discussed: Not applicable    Discussed  Partnership in Safe Discharge Planning  document with patient/family: Yes:      Handoff Completed: No, handoff not indicated or clinically appropriate    Additional Information:  Patient has a ride to [facility] today between [id number]. PAS completed. Orders received for discharge today and faxed to [facility].     PAS-RR    D: Per DHS regulation, SW completed and submitted PAS-RR to [place] [employer] via the [facility].  PAS-RR confirmation # is : [id number]    I: SW spoke with patient and they are aware a PAS-RR has been submitted.  SW reviewed with patient that they may be contacted for a follow up appointment within 10 days of hospital discharge if their SNF stay is < 30 days.  Contact information for [facility] was also provided.    A: patient verbalized understanding.    P: Further questions may be directed to [facility] at #[phone], option #4 for PAS-RR staff.    1200: writer moved ride back to [phone] as cardiology is consulted. Updated daughter of ride time and message sent to [facility].    1415: Writer informed that patient is not discharging today. Writer called and rescheduled ride for  10/5/24 between [phone]. Updated [employer] and message sent to daughter Pushpa.    Next Steps: confirm with daughter.    ALEX House

## 2024-10-04 NOTE — PROGRESS NOTES
Vocera Msg to MD regarding outstanding Med Rec,, MD stated waiting for Cardiology Consult.    Spoke w/ Ortho PA regarding Urinary Pain/Frequency/Incontinence.  Received Verbal w/ [facility] for UA.  PA stated they would enter order for Straight Cath and leave Diaz in on 2nd attempt.

## 2024-10-04 NOTE — PLAN OF CARE
Goal Outcome Evaluation:       Orientation:  A/O x4 calm/pleasant, intermitting forgetfulness and confusion, denies short of breath, chest tightness, abdominal discomfort, nausea fever and chills       Vitals: vss on R A, glucose checks     IV Access/drains: No IV access MD aware of it     Diet: regular with  good appetite     Mobility: x2 GB charu steady     GI/: incontinent, urinal used     Wound/Skin: dry and pale skin,  cms and neuro intact     Consults: Mag,Phos and Potassium     Discharge Plan: pending      See Flow sheets for assessment

## 2024-10-04 NOTE — PROGRESS NOTES
[facility]    Medicine Progress Note - Hospitalist Service    Date of Admission:  10/1/2024    Assessment & Plan   78-year-old male patient with past medical history significant for CAD, CABG in 2009, heart failure, CKD stage III, and hypertension. The patient was admitted to the intensive care unit for postoperative shock after left total hip arthroplasty that was done on elective basis.  Pt was stable and transferred out of ICU on 10/2,hospital ist was given sign out for care transfer to floor.    Left total hip arthroplasty  Post operative hypotension/shock requiring IV pressor and ICU  -post op significant hypotension requiring transfer to ICU and placed on IV pressor therapy  -has been weaned off pressor therapy prior to transfer out of ICU,able to tolerate PO intake and vitals stable.  -post op cares per orthopedic surgery  -PT/OT  -subcutaneous lovenox started for DVT Ppx  -Aspirin currently on hold      Acute anemia:  -Hemoglobin: 13.6  -continue to monitor closely after starting lovenox  -currently stable    EBONI ON CKD:   -Resolved, continue to encourage oral intake  -Likely due to hypotension postoperatively requiring IV pressor therapy and IV fluids  -Creatinine improved to 1.12 on 10/3    Essential hypertension  -Patient on Entresto, Lasix PTA  -Due to postoperative hypotension requiring IV pressor therapy all antihypertensive agents currently on hold  -Recommend to hold both Entresto and Lasix until PCP follow-up at discharge.    Type II MI 2 to demand ischemia:  -Troponin elevation persistent,no symptoms at this time  -EKG NG reviewed   -ECHO: No RWMA  -Troponin up trended, max 166, down trended on 10/3, but overnight continue to elevate up to 180  -Hence consult to cardiology has been placed in the setting of persistent elevation in troponin although no significant symptoms of chest pain or shortness of breath and echo without regional wall motion abnormalities  -Will await  "cardiology recommendations.    DM type 2:  Patient received sliding scale insulin during hospital stay  -Continue outpatient regimen at discharge    Hypophosphatemia  -Phosphorus low at 2.4 requiring aggressive supplementation  -If phosphorus continues to remain low will need outpatient supplementation.          Diet: Room Service  Regular Diet Adult  Diet    DVT Prophylaxis: Pneumatic Compression Devices  Diaz Catheter: Not present  Lines: None     Cardiac Monitoring: None  Code Status: No CPR- Do NOT Intubate      Clinically Significant Risk Factors                # Thrombocytopenia: Lowest platelets = 122 in last 2 days, will monitor for bleeding    # Heart failure with preserved ejection fraction: EF >50% and home medication list includes sacubitril/valsartan (Entresto)          # Overweight: Estimated body mass index is 26.53 kg/m  as calculated from the following:    Height as of this encounter: 1.72 m (5' 7.72\").    Weight as of this encounter: 78.5 kg (173 lb 1 oz)., PRESENT ON ADMISSION            Disposition Plan     Medically Ready for Discharge: Anticipated Tomorrow             Mel Soriano MD  Hospitalist Service  [facility]  Securely message with [employer] (more info)  Text page via [employer] Paging/Directory   ______________________________________________________________________    Interval History   Patient is sitting up in bed, appears fatigued and tired, offers no complaints of chest pain dizziness lightheadedness nausea vomiting abdominal pain fever or chills.  No significant events overnight.  Overall does not feel well just feeling very tired and fatigued.    Physical Exam   Vital Signs: Temp: 97.6  F (36.4  C) Temp src: Oral BP: (!) 155/100 Pulse: 107   Resp: 16 SpO2: 98 % O2 Device: None (Room air)    Weight: 173 lbs .98 oz    Constitutional: Awake, alert, cooperative, no apparent distress  Respiratory: Clear to auscultation bilaterally, no crackles or " wheezing  Cardiovascular: Regular rate and rhythm, normal S1 and S2, and no murmur noted  GI: Normal bowel sounds, soft, non-distended, non-tender  Skin/Integumen: No rashes, no cyanosis, no edema      Medical Decision Making

## 2024-10-04 NOTE — CONSULTS
[facility]    Cardiology Consultation     Date of Admission:  10/1/2024    Assessment & Plan   Chris Bhakta is a 78 year old male who was admitted on 10/1/2024.    Elevated troponin without other evidence of acute coronary syndrome, likely due to demand ischemia.  Echocardiogram is normal, with no regional wall motion abnormalities, EF 55 to 60%.  ECG demonstrates no acute ischemic abnormalities, with nonspecific intraventricular conduction delay.  Coronary artery disease with history of bypass surgery in 2011.  Left hip arthroplasty 10/1/2024, for management of pathological fracture of the left femur due to osteoporosis, with removal of intramedullary nail.  Postoperative hypotension requiring fluid resuscitation and pressors with care performed in the ICU.  History of hypertension  Dyslipidemia  CKD, stage IIIa.  HFpEF, chronic  Type 2 diabetes    Recommendations:    The patient had a minor troponin elevation during his hospitalization likely due to increased myocardial oxygen demand.  Troponin peaked at 180.  He denies having any chest pain symptoms.  There are no ischemic ECG changes although baseline ECG is abnormal due to nonspecific interventricular conduction delay.  Echocardiogram is normal with no regional wall motion abnormalities.  LVEF 55 to 60%.      No additional cardiac evaluation is recommended at this time.  I will recommend medication including restarting his beta-blocker for management of elevated heart rate and blood pressure.  His other medications can be restarted as needed, including Entresto and Jardiance.  He is seen regularly by cardiology at [facility], and I will have him follow-up there for discussion of outpatient stress testing with Lexiscan and nuclear perfusion imaging to evaluate this issue of demand ischemia and elevated troponin.    Cardiology will sign off    High complexity     HALLIE BECK MD, MD    Primary Care Physician   Blaise Mcwilliams  Jennifer    Reason for Consult   Reason for consult: I was asked by hospitalist team to evaluate this patient for elevated troponin.    History of Present Illness   Chris Bhakta is a 78 year old male who presents with for outpatient left hip surgery.  He underwent left total hip arthroplasty with removal of hardware, including a medullary nail which was used to provide fixation of a pathologic fracture with osteoporosis.  Unfortunately he was having issues with nonunion.  Following hip replacement surgery, he was hypotensive requiring fluids and pressors and was transferred to the ICU.  He gradually recovered and is now on the Ortho floor.    Troponin was checked and was noted to be elevated and rising to a peak of 180.  His ECG shows sinus rhythm with nonspecific intraventricular conduction delay but no acute ischemic findings.  His echocardiogram is normal.  His ejection fraction is 55 to 60% with no regional wall motion abnormalities.  He denies having any chest pain during his hospitalization.    He has a history of coronary artery disease with bypass surgery in 2011.  He has cardiac risk factors including type 2 diabetes, history of tobacco use, dyslipidemia, and hypertension.    His blood pressures have improved, now he is hypertensive.  His echocardiogram shows no regional wall motion normalities and normal overall left ventricular systolic function with an ejection fraction of 55 to 60%.    Past Medical History   Past Medical History:   Diagnosis Date    Anxiety disorder     BPH (benign prostatic hyperplasia)     CAD (coronary artery disease)     Cardiomyopathy (H)     Cataract     Cervical cord myelomalacia (H)     CHF (congestive heart failure) (H)     Chronic ischemic heart disease     Chronic pain syndrome with opioid dependence     CKD (chronic kidney disease) stage 3, GFR 30-59 ml/min (H)     Closed comminuted intertrochanteric fracture of proximal end of left femur (H)     Closed fracture of  neck of left femur (H)     Congenital multiple renal cysts     Cyst of right kidney     Dysarthria     Dyslipidemia     Dysphagia     Dyspnea     Exudative age-related macular degeneration of right eye with inactive choroidal neovascularization (H)     Femur fracture, left 09/2024    surgery in october    Folic acid deficiency     GERD (gastroesophageal reflux disease)     Hyperglycemia     Hyperlipidemia     Hypertension     Insomnia     Macular degeneration     MDD (major depressive disorder)     Mixed hyperlipidemia     Obesity     RAYMOND (obstructive sleep apnea)     Osteoarthritis     Osteoporosis     Pathological fracture of left femur due to age-related osteoporosis (H)     Primary osteoarthritis of right knee     PVD (peripheral vascular disease) (H)     Renal insufficiency syndrome     SAH (subarachnoid hemorrhage) (H)     Somnolence     Spinal stenosis     TBI (traumatic brain injury) (H)     Toxic encephalopathy     Type 2 diabetes mellitus (H)     Urinary incontinence          Past Surgical History   Past Surgical History:   Procedure Laterality Date    ARTHROPLASTY HIP Left 10/1/2024    Procedure: LEFT TOTAL HIP ARTHROPLASTY;  Surgeon: Kathryn Nicole MD;  Location: [facility] OR    CARPAL TUNNEL RELEASE      CERVICAL FUSION      CERVICAL SPINE SURGERY  2009    X2    CORONARY ARTERY BYPASS GRAFT  2003    KIDNEY CYST REMOVAL      LUMBAR DECOMPRESSION L3-S1  05/2010    LUMBAR SPINE SURGERY  2009    PTCA OF LAD (FOR FAILED GRAFT FAILURE)  2004    REMOVE HARDWARE HIP NAILING Left 10/1/2024    Procedure: LEFT FEMORAL INTRAMEDULARY NAIL REMOVAL;  Surgeon: Kathryn Nicole MD;  Location: [facility] OR    TOTAL HIP ARTHROPLASTY Right 02/21/2020    ULNAR TUNNEL RELEASE  2012         Prior to Admission Medications   Prior to Admission Medications   Prescriptions Last Dose Informant Patient Reported? Taking?   DULoxetine (CYMBALTA) 30 MG capsule  at 0900 Care Giver Yes Yes   Sig: Take 30 mg by mouth 2 times daily. 0900 & 1900    acetaminophen (TYLENOL) 500 MG tablet Past Week Care Giver Yes Yes   Sig: Take 500 mg by mouth 3 times daily   aspirin 81 MG EC tablet  at 0900 Care Giver Yes No   Sig: Take 81 mg by mouth daily.   bisacodyl (DULCOLAX) 10 MG suppository  at PRN Care Giver Yes Yes   Sig: Place 10 mg rectally daily as needed for constipation.   empagliflozin (JARDIANCE) 10 MG TABS tablet  at 0900 Care Giver Yes Yes   Sig: Take 10 mg by mouth daily.   folic acid (FOLVITE) 1 MG tablet  at 0900 Care Giver Yes Yes   Sig: Take 1 mg by mouth daily.   furosemide (LASIX) 20 MG tablet  at 0900 Care Giver Yes Yes   Sig: Take 10 mg by mouth daily. (0.3o90mt=44ns)   latanoprost (XALATAN) 0.005 % ophthalmic solution  at HS Care Giver Yes Yes   Sig: Place 1 drop into both eyes at bedtime.   metFORMIN (GLUCOPHAGE) 500 MG tablet  at 0900 Care Giver Yes Yes   Sig: Take 500 mg by mouth daily (with breakfast).   morphine (MSIR) 15 MG IR tablet  at 0900 Care Giver No Yes   Si TABLET BY MOUTH THREE TIMES DAILY AS NEEDED   Patient taking differently: Take 15 mg by mouth every morning. And 1 additional tablet as needed during same 24 hour period.   nystatin (MYCOSTATIN) 450520 UNIT/GM external powder  at PRN Care Giver Yes Yes   Sig: Apply topically 2 times daily as needed for other (Rash).   polyethylene glycol (MIRALAX) 17 g packet  at 0900 Care Giver Yes Yes   Sig: Take 1 packet by mouth daily as needed for constipation   sacubitril-valsartan (ENTRESTO) 24-26 MG per tablet  at 0900 Care Giver Yes Yes   Sig: Take 1 tablet by mouth 2 times daily. 0900 & 1900   senna-docusate (SENOKOT-S/PERICOLACE) 8.6-50 MG tablet  at PRN Care Giver Yes Yes   Sig: Take 2 tablets by mouth daily.   tamsulosin (FLOMAX) 0.4 MG 24 hr capsule  at 0900 Care Giver Yes Yes   Sig: Take 0.4 mg by mouth every morning   trolamine salicylate (ASPERCREME) 10 % external cream  at PRN Care Giver Yes Yes   Sig: Apply topically as needed for moderate pain.   vitamin D3  (CHOLECALCIFEROL) 50 mcg (2000 units) tablet  at 0900 Care Giver Yes Yes   Sig: Take 1 tablet by mouth daily.      Facility-Administered Medications: None     Current Facility-Administered Medications   Medication Dose Route Frequency Provider Last Rate Last Admin    acetaminophen (TYLENOL) tablet 650 mg  650 mg Oral Q4H PRN Mel Soriano MD        acetaminophen (TYLENOL) tablet 975 mg  975 mg Oral Q8H Mel Soriano MD   975 mg at 10/03/24 2158    albuterol (PROVENTIL) neb solution 2.5 mg  2.5 mg Nebulization Q2H PRN Mel Soriano MD        [Held by provider] aspirin (ASA) EC tablet 325 mg  325 mg Oral Daily Les Douglas PA-C        benzocaine-menthol (CHLORASEPTIC) 6-10 MG lozenge 1 lozenge  1 lozenge Buccal Q1H PRN Mel Soriano MD        bisacodyl (DULCOLAX) suppository 10 mg  10 mg Rectal Daily PRN Mel Soriano MD        glucose gel 15-30 g  15-30 g Oral Q15 Min PRN Mel Soriano MD        Or    dextrose 50 % injection 25-50 mL  25-50 mL Intravenous Q15 Min PRN Mel Soriano MD        Or    glucagon injection 1 mg  1 mg Subcutaneous Q15 Min PRN Mel Soriano MD        DULoxetine (CYMBALTA) DR capsule 30 mg  30 mg Oral BID Mel Soriano MD   30 mg at 10/04/24 0810    [Held by provider] empagliflozin (JARDIANCE) tablet 10 mg  10 mg Oral Daily Les Douglas PA-C        enoxaparin ANTICOAGULANT (LOVENOX) injection 40 mg  40 mg Subcutaneous Q24H Mel Soriano MD   40 mg at 10/04/24 1121    folic acid (FOLVITE) tablet 1 mg  1 mg Oral Daily Mel Soriano MD   1 mg at 10/04/24 0810    [Held by provider] furosemide (LASIX) half-tab 10 mg  10 mg Oral Daily Les Douglas PA-C        HYDROmorphone (DILAUDID) injection 0.2 mg  0.2 mg Intravenous Q2H PRN Mel Soriano MD        Or    HYDROmorphone (DILAUDID) injection 0.4 mg  0.4 mg Intravenous Q2H PRN Mel Soriano MD        HYDROmorphone (DILAUDID) tablet 2 mg  2 mg Oral Q4H PRN Mel Soriano MD   2 mg at  10/03/24 1849    Or    HYDROmorphone (DILAUDID) tablet 4 mg  4 mg Oral Q4H PRN Mel Soriano MD   4 mg at 10/04/24 0616    hydrOXYzine HCl (ATARAX) tablet 10 mg  10 mg Oral Q6H PRN Mel Soriano MD        insulin aspart (NovoLOG) injection (RAPID ACTING)  1-7 Units Subcutaneous TID AC Mel Soriano MD   1 Units at 10/04/24 1311    insulin aspart (NovoLOG) injection (RAPID ACTING)  1-5 Units Subcutaneous At Bedtime Mel Soriano MD        latanoprost (XALATAN) 0.005 % ophthalmic solution 1 drop  1 drop Both Eyes At Bedtime Mel Soriano MD   1 drop at 10/03/24 2159    lidocaine (LMX4) cream   Topical Q1H PRN Mel Soriano MD        lidocaine 1 % 0.1-1 mL  0.1-1 mL Other Q1H PRN Mel Soriano MD        magnesium hydroxide (MILK OF MAGNESIA) suspension 30 mL  30 mL Oral Daily PRN Mel Soriano MD        miconazole (MICATIN) 2 % powder   Topical BID Mel Soriano MD   Given at 10/04/24 0812    naloxone (NARCAN) injection 0.2 mg  0.2 mg Intravenous Q2 Min PRN Mel Soriano MD        Or    naloxone (NARCAN) injection 0.4 mg  0.4 mg Intravenous Q2 Min PRN Mel Soriano MD        Or    naloxone (NARCAN) injection 0.2 mg  0.2 mg Intramuscular Q2 Min PRN Mel Soriano MD        Or    naloxone (NARCAN) injection 0.4 mg  0.4 mg Intramuscular Q2 Min PRN Mel Soriano MD        ondansetron (ZOFRAN ODT) ODT tab 4 mg  4 mg Oral Q6H PRN Mel Soriano MD        Or    ondansetron (ZOFRAN) injection 4 mg  4 mg Intravenous Q6H PRN Mel Soriano MD        polyethylene glycol (MIRALAX) Packet 17 g  17 g Oral Daily Mel Soriano MD   17 g at 10/02/24 0808    polyethylene glycol (MIRALAX) Packet 17 g  17 g Oral Daily PRN Mel Soriano MD        potassium & sodium phosphates (NEUTRA-PHOS) Packet 1 packet  1 packet Oral or Feeding Tube Q4H Kathryn Nicole MD   1 packet at 10/04/24 1121    prochlorperazine (COMPAZINE) injection 5 mg  5 mg Intravenous Q6H PRN Mel Soriano MD        Or     prochlorperazine (COMPAZINE) tablet 5 mg  5 mg Oral Q6H PRN Mel Soriano MD        Or    prochlorperazine (COMPAZINE) suppository 12.5 mg  12.5 mg Rectal Q12H PRN Mel Soriano MD        [Held by provider] sacubitril-valsartan (ENTRESTO) 24-26 MG per tablet 1 tablet  1 tablet Oral BID Les Douglas PA-C        senna-docusate (SENOKOT-S/PERICOLACE) 8.6-50 MG per tablet 1 tablet  1 tablet Oral BID Mel Soriano MD   1 tablet at 10/03/24 2159    senna-docusate (SENOKOT-S/PERICOLACE) 8.6-50 MG per tablet 1 tablet  1 tablet Oral BID PRN Mle Soriano MD        Or    senna-docusate (SENOKOT-S/PERICOLACE) 8.6-50 MG per tablet 2 tablet  2 tablet Oral BID PRN Mel Soriano MD        sodium chloride (PF) 0.9% PF flush 3 mL  3 mL Intracatheter Q8H Mel Soriano MD   3 mL at 10/04/24 0618    sodium chloride (PF) 0.9% PF flush 3 mL  3 mL Intracatheter q1 min prn Mel Soriano MD        [Held by provider] tamsulosin (FLOMAX) capsule 0.4 mg  0.4 mg Oral QAM Les Douglas PA-C        trolamine salicylate (ASPERCREME) 10 % cream   Topical Daily PRN Mel Soriano MD        vitamin D3 (CHOLECALCIFEROL) tablet 50 mcg  50 mcg Oral Daily Mel Soriano MD   50 mcg at 10/04/24 0810     Current Facility-Administered Medications   Medication Dose Route Frequency Provider Last Rate Last Admin    acetaminophen (TYLENOL) tablet 650 mg  650 mg Oral Q4H PRN Mel Soriano MD        acetaminophen (TYLENOL) tablet 975 mg  975 mg Oral Q8H Mel Soriano MD   975 mg at 10/03/24 2158    albuterol (PROVENTIL) neb solution 2.5 mg  2.5 mg Nebulization Q2H PRN Mel Soriano MD        [Held by provider] aspirin (ASA) EC tablet 325 mg  325 mg Oral Daily Les Douglas PA-C        benzocaine-menthol (CHLORASEPTIC) 6-10 MG lozenge 1 lozenge  1 lozenge Buccal Q1H PRN Mel Soriano MD        bisacodyl (DULCOLAX) suppository 10 mg  10 mg Rectal Daily PRN Mel Soriano MD        glucose gel 15-30 g   15-30 g Oral Q15 Min PRN Mel Soriano MD        Or    dextrose 50 % injection 25-50 mL  25-50 mL Intravenous Q15 Min PRN Mel Soriano MD        Or    glucagon injection 1 mg  1 mg Subcutaneous Q15 Min PRN Mel Soriano MD        DULoxetine (CYMBALTA) DR capsule 30 mg  30 mg Oral BID Mel Soriano MD   30 mg at 10/04/24 0810    [Held by provider] empagliflozin (JARDIANCE) tablet 10 mg  10 mg Oral Daily Les Douglas PA-C        enoxaparin ANTICOAGULANT (LOVENOX) injection 40 mg  40 mg Subcutaneous Q24H Mel Soriano MD   40 mg at 10/04/24 1121    folic acid (FOLVITE) tablet 1 mg  1 mg Oral Daily Mel Soriano MD   1 mg at 10/04/24 0810    [Held by provider] furosemide (LASIX) half-tab 10 mg  10 mg Oral Daily Les Douglas PA-C        HYDROmorphone (DILAUDID) injection 0.2 mg  0.2 mg Intravenous Q2H PRN Mel Soriano MD        Or    HYDROmorphone (DILAUDID) injection 0.4 mg  0.4 mg Intravenous Q2H PRN Mel Soriano MD        HYDROmorphone (DILAUDID) tablet 2 mg  2 mg Oral Q4H PRN Mel Soriano MD   2 mg at 10/03/24 1849    Or    HYDROmorphone (DILAUDID) tablet 4 mg  4 mg Oral Q4H PRN Mel Soriano MD   4 mg at 10/04/24 0616    hydrOXYzine HCl (ATARAX) tablet 10 mg  10 mg Oral Q6H PRN Mel Soriano MD        insulin aspart (NovoLOG) injection (RAPID ACTING)  1-7 Units Subcutaneous TID AC Mel Soriano MD   1 Units at 10/04/24 1311    insulin aspart (NovoLOG) injection (RAPID ACTING)  1-5 Units Subcutaneous At Bedtime Mel Soriano MD        latanoprost (XALATAN) 0.005 % ophthalmic solution 1 drop  1 drop Both Eyes At Bedtime Mel Soriano MD   1 drop at 10/03/24 215    lidocaine (LMX4) cream   Topical Q1H PRN Mel Soriano MD        lidocaine 1 % 0.1-1 mL  0.1-1 mL Other Q1H PRN Mel Soriano MD        magnesium hydroxide (MILK OF MAGNESIA) suspension 30 mL  30 mL Oral Daily PRN Mel oSriano MD        miconazole (MICATIN) 2 % powder   Topical BID  Mel Soriano MD   Given at 10/04/24 0812    naloxone (NARCAN) injection 0.2 mg  0.2 mg Intravenous Q2 Min PRN Mel Soriano MD        Or    naloxone (NARCAN) injection 0.4 mg  0.4 mg Intravenous Q2 Min PRN Mel Soriano MD        Or    naloxone (NARCAN) injection 0.2 mg  0.2 mg Intramuscular Q2 Min PRN Mel Soriano MD        Or    naloxone (NARCAN) injection 0.4 mg  0.4 mg Intramuscular Q2 Min PRN Mel Soriano MD        ondansetron (ZOFRAN ODT) ODT tab 4 mg  4 mg Oral Q6H PRN Mel Soriano MD        Or    ondansetron (ZOFRAN) injection 4 mg  4 mg Intravenous Q6H PRN Mel Soriano MD        polyethylene glycol (MIRALAX) Packet 17 g  17 g Oral Daily Mel Soriano MD   17 g at 10/02/24 0808    polyethylene glycol (MIRALAX) Packet 17 g  17 g Oral Daily PRN Mel Soriano MD        potassium & sodium phosphates (NEUTRA-PHOS) Packet 1 packet  1 packet Oral or Feeding Tube Q4H Kathryn Nicole MD   1 packet at 10/04/24 1121    prochlorperazine (COMPAZINE) injection 5 mg  5 mg Intravenous Q6H PRN Mel Soriano MD        Or    prochlorperazine (COMPAZINE) tablet 5 mg  5 mg Oral Q6H PRN Mel Soriano MD        Or    prochlorperazine (COMPAZINE) suppository 12.5 mg  12.5 mg Rectal Q12H PRN Mel Soriano MD        [Held by provider] sacubitril-valsartan (ENTRESTO) 24-26 MG per tablet 1 tablet  1 tablet Oral BID Les Douglas PA-C        senna-docusate (SENOKOT-S/PERICOLACE) 8.6-50 MG per tablet 1 tablet  1 tablet Oral BID Mel Soriano MD   1 tablet at 10/03/24 2159    senna-docusate (SENOKOT-S/PERICOLACE) 8.6-50 MG per tablet 1 tablet  1 tablet Oral BID PRN Mel Soriano MD        Or    senna-docusate (SENOKOT-S/PERICOLACE) 8.6-50 MG per tablet 2 tablet  2 tablet Oral BID PRN Mel Soriano MD        sodium chloride (PF) 0.9% PF flush 3 mL  3 mL Intracatheter Q8H Mel Soriano MD   3 mL at 10/04/24 0618    sodium chloride (PF) 0.9% PF flush 3 mL  3 mL Intracatheter q1  "min prn Mel Soriano MD        [Held by provider] tamsulosin (FLOMAX) capsule 0.4 mg  0.4 mg Oral Les Dominguez PA-C        trolamine salicylate (ASPERCREME) 10 % cream   Topical Daily PRN Mel Soriano MD        vitamin D3 (CHOLECALCIFEROL) tablet 50 mcg  50 mcg Oral Daily Mel Soriano MD   50 mcg at 10/04/24 0810     Allergies   Allergies   Allergen Reactions    Iodinated Contrast Media Shortness Of Breath    Atorvastatin Fatigue    Lisinopril Cough    Oxycodone Confusion    Sertraline Dizziness       Social History    reports that he quit smoking about 7 weeks ago. His smoking use included cigarettes. He started smoking about 54 years ago. He has a 26.9 pack-year smoking history. He has never used smokeless tobacco. He reports that he does not currently use alcohol.      Family History     No significant family history       Review of Systems   A comprehensive review of system was performed and is negative other than that noted in the HPI or here.     Physical Exam   Vital Signs with Ranges  Temp:  [97.6  F (36.4  C)-98.7  F (37.1  C)] 97.6  F (36.4  C)  Pulse:  [] 107  Resp:  [16] 16  BP: (155-162)/() 155/100  SpO2:  [96 %-98 %] 98 %  Wt Readings from Last 4 Encounters:   10/04/24 78.5 kg (173 lb 1 oz)   02/09/22 93 kg (205 lb)   12/17/21 93 kg (205 lb)   11/17/21 86.2 kg (190 lb)     I/O last 3 completed shifts:  In: 200 [P.O.:200]  Out: 3425 [Urine:3425]      Vitals: BP (!) 155/100 (BP Location: Right arm)   Pulse 107   Temp 97.6  F (36.4  C) (Oral)   Resp 16   Ht 1.72 m (5' 7.72\")   Wt 78.5 kg (173 lb 1 oz)   SpO2 98%   BMI 26.53 kg/m      Physical Exam:   General - Alert and oriented to time place and person in no acute distress  Eyes - No scleral icterus  HEENT - Neck supple, moist mucous membranes  Cardiovascular -regular rhythm without murmur  Extremities - There is no lower extremity edema  Respiratory -clear to auscultation  Skin - No pallor or " "cyanosis  Gastrointestinal - Non tender and non distended without rebound or guarding  Psych - Appropriate affect   Neurological - No gross motor neurological focal deficits    No lab results found in last 7 days.    Invalid input(s): \"TROPONINIES\"    Recent Labs   Lab 10/04/24  1113 10/04/24  0758 10/04/24  0752 10/03/24  1105 10/03/24  0543 10/02/24  0543 10/02/24  0540 10/02/24  0151 10/01/24  2242   WBC  --  10.5  --   --  8.2  --  9.8  --  10.0   HGB  --  13.6  --   --  11.2*  --  11.4*  --  12.2*   MCV  --  99  --   --  101*  --  99  --  101*   PLT  --  153  --   --  122*  --  147*  --  165   INR  --   --   --   --   --   --   --   --  1.24*   NA  --  137  --   --  137  --  138  --  137   POTASSIUM  --  5.1  --   --  5.0  --  5.3  --  4.7   CHLORIDE  --  101  --   --  106  --  105  --  101   CO2  --  22  --   --  24  --  22  --  16*   BUN  --  22.6  --   --  31.0*  --  30.4*  --  28.1*   CR  --  0.86  --   --  1.12  --  1.37*  --  1.28*   GFRESTIMATED  --  88  --   --  66  --  52*  --  57*   ANIONGAP  --  14  --   --  7  --  11  --  20*   VIVIAN  --  9.6  --   --  9.1  --  8.9  --  9.3   * 138* 124*   < > 141*  141*   < > 158*   < > 165*   ALBUMIN  --   --   --   --   --   --  3.5  --  3.8   PROTTOTAL  --   --   --   --   --   --  5.9*  --  6.5   BILITOTAL  --   --   --   --   --   --  0.7  --  1.5*   ALKPHOS  --   --   --   --   --   --  67  --  72   ALT  --   --   --   --   --   --  9  --  11   AST  --   --   --   --   --   --  20  --  19    < > = values in this interval not displayed.     Recent Labs   Lab Test 09/28/16  0700   CHOL 176   HDL 39*   *   TRIG 110     Recent Labs   Lab 10/04/24  0758 10/03/24  0543 10/02/24  0540   WBC 10.5 8.2 9.8   HGB 13.6 11.2* 11.4*   HCT 40.4 33.7* 33.4*   MCV 99 101* 99    122* 147*     No results for input(s): \"PH\", \"PHV\", \"PO2\", \"PO2V\", \"SAT\", \"PCO2\", \"PCO2V\", \"HCO3\", \"HCO3V\" in the last 168 hours.  No results for input(s): \"NTBNPI\", \"NTBNP\" in the " "last 168 hours.  No results for input(s): \"DD\" in the last 168 hours.  No results for input(s): \"SED\", \"CRP\" in the last 168 hours.  Recent Labs   Lab 10/04/24  0758 10/03/24  0543 10/02/24  0540    122* 147*     No results for input(s): \"TSH\" in the last 168 hours.  No results for input(s): \"COLOR\", \"APPEARANCE\", \"URINEGLC\", \"URINEBILI\", \"URINEKETONE\", \"SG\", \"UBLD\", \"URINEPH\", \"PROTEIN\", \"UROBILINOGEN\", \"NITRITE\", \"LEUKEST\", \"RBCU\", \"WBCU\" in the last 168 hours.    Imaging:  No results found for this or any previous visit (from the past 48 hour(s)).    Echo:  No results found for this or any previous visit (from the past 4320 hour(s)).    Clinically Significant Risk Factors                # Thrombocytopenia: Lowest platelets = 122 in last 2 days, will monitor for bleeding    # Heart failure with preserved ejection fraction: EF >50% and home medication list includes sacubitril/valsartan (Entresto)          # Overweight: Estimated body mass index is 26.53 kg/m  as calculated from the following:    Height as of this encounter: 1.72 m (5' 7.72\").    Weight as of this encounter: 78.5 kg (173 lb 1 oz)., PRESENT ON ADMISSION           Native vessel CAD  Diastolic chronic                    Acute kidney failure, unspecified  CKD POA List: Stage 3b (GFR 30-44)                                "

## 2024-10-04 NOTE — PROGRESS NOTES
Diagnosis: Left total hip Arthroplasty, Left Femoral Intramedulary Nail removal   POD#:3  Mental Status: A&O x4  Activity/dangle Up with assist of 2 with Jodie Steady  Diet:Regular  Pain: PRN Dilaudid  Diaz/Voiding:External Cath  Tele/Restraints/Iso: NA  02/LDA: RACH STANLEY  D/C Date: Pending  Other Info: CMS intact. Dressings CDI

## 2024-10-05 VITALS
HEART RATE: 91 BPM | SYSTOLIC BLOOD PRESSURE: 141 MMHG | HEIGHT: 68 IN | OXYGEN SATURATION: 97 % | DIASTOLIC BLOOD PRESSURE: 87 MMHG | RESPIRATION RATE: 16 BRPM | BODY MASS INDEX: 26.23 KG/M2 | TEMPERATURE: 97.7 F | WEIGHT: 173.06 LBS

## 2024-10-05 LAB
ANION GAP SERPL CALCULATED.3IONS-SCNC: 15 MMOL/L (ref 7–15)
BUN SERPL-MCNC: 31.9 MG/DL (ref 8–23)
CALCIUM SERPL-MCNC: 9.7 MG/DL (ref 8.8–10.4)
CHLORIDE SERPL-SCNC: 103 MMOL/L (ref 98–107)
CREAT SERPL-MCNC: 0.93 MG/DL (ref 0.67–1.17)
EGFRCR SERPLBLD CKD-EPI 2021: 83 ML/MIN/1.73M2
ERYTHROCYTE [DISTWIDTH] IN BLOOD BY AUTOMATED COUNT: 12.9 % (ref 10–15)
GLUCOSE BLDC GLUCOMTR-MCNC: 135 MG/DL (ref 70–99)
GLUCOSE BLDC GLUCOMTR-MCNC: 145 MG/DL (ref 70–99)
GLUCOSE BLDC GLUCOMTR-MCNC: 164 MG/DL (ref 70–99)
GLUCOSE SERPL-MCNC: 124 MG/DL (ref 70–99)
HCO3 SERPL-SCNC: 23 MMOL/L (ref 22–29)
HCT VFR BLD AUTO: 39.6 % (ref 40–53)
HGB BLD-MCNC: 13.4 G/DL (ref 13.3–17.7)
MAGNESIUM SERPL-MCNC: 2.3 MG/DL (ref 1.7–2.3)
MCH RBC QN AUTO: 33.8 PG (ref 26.5–33)
MCHC RBC AUTO-ENTMCNC: 33.8 G/DL (ref 31.5–36.5)
MCV RBC AUTO: 100 FL (ref 78–100)
PHOSPHATE SERPL-MCNC: 3.2 MG/DL (ref 2.5–4.5)
PLATELET # BLD AUTO: 169 10E3/UL (ref 150–450)
POTASSIUM SERPL-SCNC: 4.6 MMOL/L (ref 3.4–5.3)
RBC # BLD AUTO: 3.96 10E6/UL (ref 4.4–5.9)
SODIUM SERPL-SCNC: 141 MMOL/L (ref 135–145)
WBC # BLD AUTO: 8.6 10E3/UL (ref 4–11)

## 2024-10-05 PROCEDURE — 83735 ASSAY OF MAGNESIUM: CPT | Performed by: INTERNAL MEDICINE

## 2024-10-05 PROCEDURE — 250N000013 HC RX MED GY IP 250 OP 250 PS 637: Performed by: INTERNAL MEDICINE

## 2024-10-05 PROCEDURE — 36415 COLL VENOUS BLD VENIPUNCTURE: CPT | Performed by: INTERNAL MEDICINE

## 2024-10-05 PROCEDURE — 250N000011 HC RX IP 250 OP 636: Performed by: INTERNAL MEDICINE

## 2024-10-05 PROCEDURE — 80048 BASIC METABOLIC PNL TOTAL CA: CPT | Performed by: INTERNAL MEDICINE

## 2024-10-05 PROCEDURE — 84100 ASSAY OF PHOSPHORUS: CPT | Performed by: INTERNAL MEDICINE

## 2024-10-05 PROCEDURE — 85027 COMPLETE CBC AUTOMATED: CPT | Performed by: INTERNAL MEDICINE

## 2024-10-05 RX ORDER — CARVEDILOL 12.5 MG/1
12.5 TABLET ORAL 2 TIMES DAILY WITH MEALS
Status: SHIPPED
Start: 2024-10-05

## 2024-10-05 RX ADMIN — ENOXAPARIN SODIUM 40 MG: 40 INJECTION SUBCUTANEOUS at 12:12

## 2024-10-05 RX ADMIN — CARVEDILOL 12.5 MG: 12.5 TABLET, FILM COATED ORAL at 08:39

## 2024-10-05 RX ADMIN — HYDROMORPHONE HYDROCHLORIDE 4 MG: 2 TABLET ORAL at 13:46

## 2024-10-05 RX ADMIN — Medication 50 MCG: at 08:39

## 2024-10-05 RX ADMIN — HYDROMORPHONE HYDROCHLORIDE 4 MG: 2 TABLET ORAL at 08:39

## 2024-10-05 RX ADMIN — DULOXETINE HYDROCHLORIDE 30 MG: 30 CAPSULE, DELAYED RELEASE ORAL at 08:39

## 2024-10-05 RX ADMIN — MICONAZOLE NITRATE: 2 POWDER TOPICAL at 08:42

## 2024-10-05 RX ADMIN — FOLIC ACID 1 MG: 1 TABLET ORAL at 08:39

## 2024-10-05 ASSESSMENT — ACTIVITIES OF DAILY LIVING (ADL)
ADLS_ACUITY_SCORE: 38
ADLS_ACUITY_SCORE: 34
ADLS_ACUITY_SCORE: 38
ADLS_ACUITY_SCORE: 34
ADLS_ACUITY_SCORE: 34
ADLS_ACUITY_SCORE: 38
ADLS_ACUITY_SCORE: 38
ADLS_ACUITY_SCORE: 34
ADLS_ACUITY_SCORE: 38

## 2024-10-05 NOTE — PLAN OF CARE
"Physical Therapy Discharge Summary    Reason for therapy discharge:    Discharged to transitional care facility.    Progress towards therapy goal(s). See goals on Care Plan in [facility] electronic health record for goal details.  Goals partially met.  Barriers to achieving goals:   discharge from facility.    Therapy recommendation(s):    Continued therapy is recommended.  Rationale/Recommendations:  per most recent PT note: \"Pt reports he can not take care of himself in his current state. States he plans to go to [facility]. Reports he has been there before and would like to go back. Pt will benefit from skilled rehab services in a TCU setting for optimal safety and indepenence with mobility prior to rturn to his custodial,\" .      "

## 2024-10-05 NOTE — PROGRESS NOTES
Care Management Discharge Note    Discharge Date: 10/05/2024       Discharge Disposition: Transitional Care    Discharge Services: Other (see comment) (Therapy)    Discharge DME: None    Discharge Transportation: agency    Private pay costs discussed: Not applicable    Does the patient's insurance plan have a 3 day qualifying hospital stay waiver?  No    PAS Confirmation Code: [id number]  Patient/family educated on Medicare website which has current facility and service quality ratings: yes    Education Provided on the Discharge Plan: Yes  Persons Notified of Discharge Plans:   Patient  Patient/Family in Agreement with the Plan: yes    Handoff Referral Completed: No, handoff not indicated or clinically appropriate    Additional Information:  Received discharge orders for patient.  Bed available at [facility] for today.   [facility] wheelchair ride arranged for between 13:08-13:53 today.  Patient has [facility] therefore his insurance should cover the cost of the transport.  Patient informed of the plan and in agreement to the plan.  Call placed and message left to update [facility] and faxed the orders.        JOAN Servin, [facility]    [phone]  [facility]

## 2024-10-05 NOTE — PROGRESS NOTES
Diagnosis:Left total hip with Nail Removal  POD#:4  Mental Status: A&O x4  Activity/dangle Extensive assist of 2 Gb and Jodie Steady  Diet: Regular  Pain: Denied  Diaz/Voiding: urinal/incontinent of B&B  Tele/Restraints/Iso:NA  02/LDA: Room Air. No IV access  D/C Date:TBD  Other Info: Left leg Aquacel dressing and Clear Liquid dressing CDI. On Potassium, Magnesium and Phosphorus protocol. Bladder scanned 600 ml. Pt voided 120 ml. Diaz reinserted at 0645 am per order. Diaz Out put 550 ml. Large BM x 1.

## 2024-10-05 NOTE — PLAN OF CARE
Goal Outcome Evaluation:      Plan of Care Reviewed With: patient    Overall Patient Progress: improvingOverall Patient Progress: improving    Outcome Evaluation: Discharge to [facility] 10/05/2024 after 13:00 via Medical Transport.  Diaz place at end of NOC today.  Continued Intermittent Confusion at baseline.    Patient vital signs are at baseline: Yes  Patient able to ambulate as they were prior to admission or with assist devices provided by therapies during their stay:  Yes  Ax2 w/ SaraStedy for Transfers  Patient MUST void prior to discharge:  No,  Reason:  Diaz cath in place.  Patient able to tolerate oral intake:  Yes  Pain has adequate pain control using Oral analgesics:  Yes  Does patient have an identified :  No,  Reason:  Discharge to [facility] 10/05/2024 after 13:00  Has goal D/C date and time been discussed with patient:  Yes    Discharge to [facility] 10/05/2024 after 13:00 via Medical Transport.    Dx:Left Total Hip Arthroplasty w/ Nailing Removal  POD#4    A&Ox4 w/ Intermittent Confusion at baseline.   CMS Intact.   VSS on RA.   Up with Ax2 w/GB and SaraStedy.   Voiding via Diaz Cath.   No PIV Access.  Regular Diet.  Pain controlled with PRN PO Dilaudid.     Reviewed discharge instructions and medications with patient:NO;  Discharge to TCU  Questions answered:NO, d/t Discharge to TCU and baseline Confusion  Patient discharged to:[facility] 10/05/2024 after 13:00 via Medical Transport.  All belongs discharged with patient:YES

## 2024-10-07 ENCOUNTER — TRANSITIONAL CARE UNIT VISIT (OUTPATIENT)
Dept: GERIATRICS | Facility: CLINIC | Age: 79
End: 2024-10-07
Payer: COMMERCIAL

## 2024-10-07 VITALS
HEART RATE: 76 BPM | WEIGHT: 175 LBS | HEIGHT: 61 IN | TEMPERATURE: 98.7 F | DIASTOLIC BLOOD PRESSURE: 67 MMHG | BODY MASS INDEX: 33.04 KG/M2 | OXYGEN SATURATION: 93 % | RESPIRATION RATE: 18 BRPM | SYSTOLIC BLOOD PRESSURE: 109 MMHG

## 2024-10-07 DIAGNOSIS — N18.30 STAGE 3 CHRONIC KIDNEY DISEASE, UNSPECIFIED WHETHER STAGE 3A OR 3B CKD (H): ICD-10-CM

## 2024-10-07 DIAGNOSIS — M16.12 PRIMARY OSTEOARTHRITIS OF LEFT HIP: Primary | ICD-10-CM

## 2024-10-07 DIAGNOSIS — K59.03 DRUG-INDUCED CONSTIPATION: ICD-10-CM

## 2024-10-07 DIAGNOSIS — R53.81 PHYSICAL DECONDITIONING: ICD-10-CM

## 2024-10-07 DIAGNOSIS — R33.9 URINARY RETENTION: ICD-10-CM

## 2024-10-07 DIAGNOSIS — I10 BENIGN ESSENTIAL HYPERTENSION: ICD-10-CM

## 2024-10-07 DIAGNOSIS — I50.32 CHRONIC DIASTOLIC HEART FAILURE (H): ICD-10-CM

## 2024-10-07 DIAGNOSIS — I25.10 CORONARY ARTERY DISEASE INVOLVING NATIVE CORONARY ARTERY OF NATIVE HEART WITHOUT ANGINA PECTORIS: ICD-10-CM

## 2024-10-07 DIAGNOSIS — D62 ANEMIA DUE TO BLOOD LOSS, ACUTE: ICD-10-CM

## 2024-10-07 PROCEDURE — 99310 SBSQ NF CARE HIGH MDM 45: CPT | Performed by: NURSE PRACTITIONER

## 2024-10-07 NOTE — LETTER
10/7/2024      Chris Bhakta  [address] [place] [address]        [facility] GERIATRICS    PRIMARY CARE PROVIDER AND CLINIC:  Blaise Rodriguez, MEMO CNP, [address] [facility] / [place] [address]  Chief Complaint   Patient presents with     Hospital F/U      [facility] Medical Record Number:  [id number]  Place of Service where encounter took place:  [facility]) [25]    Chris Bhakta  is a 78 year old  (1945), admitted to the above facility from  [facility]. Hospital stay 10/1/24 through 10/5/24..   HPI:    PMH of CAD, CABG 2009, HTN, CHF, CKD who presented to hospital for elective left YELENA, post op complications included hypotension/shock requiring and ICU stay  Left YELENA 10/1/24  Post op hypothension requiring fluid resuscitation and pressors ICU.   Elevated troponin, cardiology assessed, Echo wnl with EF 60%, no evidence of ischemia cardiology signed off  On exam today: patient sitting up on edge of bed, states left hip pain is rated 6/10, slept poorly last night, appetite fair and he has not had a BM since surgery. Denies CP, palpitations, SOB, N/V/D     CODE STATUS/ADVANCE DIRECTIVES DISCUSSION:  Prior  DNR / DNI  ALLERGIES:   Allergies   Allergen Reactions     Iodinated Contrast Media Shortness Of Breath     Atorvastatin Fatigue     Lisinopril Cough     Oxycodone Confusion     Sertraline Dizziness      PAST MEDICAL HISTORY:   Past Medical History:   Diagnosis Date     Anxiety disorder      BPH (benign prostatic hyperplasia)      CAD (coronary artery disease)      Cardiomyopathy (H)      Cataract      Cervical cord myelomalacia (H)      CHF (congestive heart failure) (H)      Chronic ischemic heart disease      Chronic pain syndrome with opioid dependence      CKD (chronic kidney disease) stage 3, GFR 30-59 ml/min (H)      Closed comminuted intertrochanteric fracture of proximal end of left femur (H)      Closed fracture of neck of left  femur (H)      Congenital multiple renal cysts      Cyst of right kidney      Dysarthria      Dyslipidemia      Dysphagia      Dyspnea      Exudative age-related macular degeneration of right eye with inactive choroidal neovascularization (H)      Femur fracture, left 09/2024    surgery in october     Folic acid deficiency      GERD (gastroesophageal reflux disease)      Hyperglycemia      Hyperlipidemia      Hypertension      Insomnia      Macular degeneration      MDD (major depressive disorder)      Mixed hyperlipidemia      Obesity      RAYMOND (obstructive sleep apnea)      Osteoarthritis      Osteoporosis      Pathological fracture of left femur due to age-related osteoporosis (H)      Primary osteoarthritis of right knee      PVD (peripheral vascular disease) (H)      Renal insufficiency syndrome      SAH (subarachnoid hemorrhage) (H)      Somnolence      Spinal stenosis      TBI (traumatic brain injury) (H)      Toxic encephalopathy      Type 2 diabetes mellitus (H)      Urinary incontinence       PAST SURGICAL HISTORY:   has a past surgical history that includes CORONARY ARTERY BYPASS GRAFT (2003); PTCA OF LAD (FOR FAILED GRAFT FAILURE) (2004); CARPAL TUNNEL RELEASE; Lumbar Spine Surgery (2009); Cervical Spine Surgery (2009); Ulnar Tunnel Release (2012); Cervical Fusion; LUMBAR DECOMPRESSION L3-S1 (05/2010); KIDNEY CYST REMOVAL; TOTAL HIP ARTHROPLASTY (Right, 02/21/2020); Arthroplasty hip (Left, 10/1/2024); and Remove hardware hip nailing (Left, 10/1/2024).  FAMILY HISTORY: family history is not on file.  SOCIAL HISTORY:   reports that he quit smoking about 7 weeks ago. His smoking use included cigarettes. He started smoking about 54 years ago. He has a 26.9 pack-year smoking history. He has never used smokeless tobacco. He reports that he does not currently use alcohol.  Patient's living condition: lives in an assisted living facility    Post Discharge Medication Reconciliation Status:   MED REC REQUIRED  Post  Medication Reconciliation Status: discharge medications reconciled and changed, per note/orders       Current Outpatient Medications   Medication Sig Dispense Refill     acetaminophen (TYLENOL) 325 MG tablet Take 2 tablets (650 mg) by mouth every 4 hours as needed for other (mild pain). 100 tablet 0     acetaminophen (TYLENOL) 500 MG tablet Take 500 mg by mouth 3 times daily       bisacodyl (DULCOLAX) 10 MG suppository Place 10 mg rectally daily as needed for constipation.       carvedilol (COREG) 12.5 MG tablet Take 1 tablet (12.5 mg) by mouth 2 times daily (with meals).       DULoxetine (CYMBALTA) 30 MG capsule Take 30 mg by mouth 2 times daily. 0900 & 1900       empagliflozin (JARDIANCE) 10 MG TABS tablet Take 10 mg by mouth daily.       enoxaparin ANTICOAGULANT (LOVENOX) 40 MG/0.4ML syringe Inject 0.4 mLs (40 mg) subcutaneously every 24 hours for 14 days. 5.6 mL 0     folic acid (FOLVITE) 1 MG tablet Take 1 mg by mouth daily.       furosemide (LASIX) 20 MG tablet Take 10 mg by mouth daily. ([id number])       HYDROmorphone (DILAUDID) 2 MG tablet Take 1-2 tablets (2-4 mg) by mouth every 4 hours as needed for moderate pain. 26 tablet 0     hydrOXYzine HCl (ATARAX) 10 MG tablet Take 1 tablet (10 mg) by mouth every 6 hours as needed for other (adjuvant pain). 30 tablet 0     latanoprost (XALATAN) 0.005 % ophthalmic solution Place 1 drop into both eyes at bedtime.       metFORMIN (GLUCOPHAGE) 500 MG tablet Take 1 tablet (500 mg) by mouth daily (with breakfast). Diabetes mellitus       nystatin (MYCOSTATIN) 136158 UNIT/GM external powder Apply topically 2 times daily as needed for other (Rash).       ondansetron (ZOFRAN ODT) 4 MG ODT tab Take 1 tablet (4 mg) by mouth every 6 hours as needed for nausea or vomiting. 30 tablet 0     polyethylene glycol (MIRALAX) 17 g packet Take 1 packet by mouth daily as needed for constipation       sacubitril-valsartan (ENTRESTO) 24-26 MG per tablet Take 1 tablet by mouth 2 times  "daily. 0900 & 1900  hypertension       senna-docusate (SENOKOT-S/PERICOLACE) 8.6-50 MG tablet Take 2 tablets by mouth daily.       tamsulosin (FLOMAX) 0.4 MG 24 hr capsule Take 0.4 mg by mouth every morning       trolamine salicylate (ASPERCREME) 10 % external cream Apply topically as needed for moderate pain.       vitamin D3 (CHOLECALCIFEROL) 50 mcg (2000 units) tablet Take 1 tablet by mouth daily.       No current facility-administered medications for this visit.       ROS:  10 point ROS of systems including Constitutional, Eyes, Respiratory, Cardiovascular, Gastroenterology, Genitourinary, Integumentary, Musculoskeletal, Psychiatric were all negative except for pertinent positives noted in my HPI.    Vitals:  /67   Pulse 76   Temp 98.7  F (37.1  C)   Resp 18   Ht 1.549 m (5' 1\")   Wt 79.4 kg (175 lb)   SpO2 93%   BMI 33.07 kg/m    Exam:  GENERAL APPEARANCE:  Alert, in no distress  ENT:  Mouth and posterior oropharynx normal, moist mucous membranes, [place]  EYES:  EOM, conjunctivae, lids, pupils and irises normal, PERRL  RESP:  respiratory effort and palpation of chest normal, lungs clear to auscultation , no respiratory distress  CV:  regular rate and rhythm, no murmur, rub, or gallop, no edema  ABDOMEN:  normal bowel sounds, soft, nontender, no hepatosplenomegaly or other masses  M/S:   patient sitting up on edge of bed  SKIN:  Inspection of skin and subcutaneous tissue baseline, did not visualize surgical incision  NEURO:   speech wnl  PSYCH:  affect and mood normal    Lab/Diagnostic data:  Recent labs in [facility] reviewed by me today.  and Most Recent 3 CBC's:  Recent Labs   Lab Test 10/05/24  0719 10/04/24  0758 10/03/24  0543   WBC 8.6 10.5 8.2   HGB 13.4 13.6 11.2*    99 101*    153 122*     Most Recent 3 BMP's:  Recent Labs   Lab Test 10/05/24  1129 10/05/24  0808 10/05/24  0719 10/04/24  1113 10/04/24  0758 10/03/24  1105 10/03/24  0543   NA  --   --  141  --  137  --  137   POTASSIUM "  --   --  4.6  --  5.1  --  5.0   CHLORIDE  --   --  103  --  101  --  106   CO2  --   --  23  --  22  --  24   BUN  --   --  31.9*  --  22.6  --  31.0*   CR  --   --  0.93  --  0.86  --  1.12   ANIONGAP  --   --  15  --  14  --  7   VIVIAN  --   --  9.7  --  9.6  --  9.1   * 135* 124*   < > 138*   < > 141*  141*    < > = values in this interval not displayed.       ASSESSMENT/PLAN:    (M16.12) Primary osteoarthritis of left hip  (primary encounter diagnosis)  (R53.81) Physical deconditioning  Comment: acute/ongoing s/p left YELENA 10/1/24  Plan: PT and OT, tylenol 500mg TID, dilaudid 2-4mg q 4 hours prn, hydroxyzine 10mg q 6 hours prn,   Lovenox 40mg SQ QD for 14 days  F/u with ortho as directed    (I25.10) Coronary artery disease involving native coronary artery of native heart without angina pectoris  (I50.32) Chronic diastolic heart failure (H)  (I10) Benign essential hypertension  (N18.30) Stage 3 chronic kidney disease, unspecified whether stage 3a or 3b CKD (H)  Comment: acute/ongoing   Required ICU stay for hypotension/shock post op, elevated troponin, no ischemic findings per cardiology EF of 60%  Plan: BMP follow, continue coreg 12.5mg BID, entresto 24-26mg BID, jardiance 10mg QD, lasix 10mg QD    (D62) Anemia due to blood loss, acute  Comment: acute/ongoing  Plan: Hgb follow    (K59.03) Drug-induced constipation  Comment: acute/ongoing  Plan: continue senna s 2 PO QD, miralax 17gm QD prn    (R33.9) urinary retention  Acute/ongoing  Plan: willis cares, will attempt to DC willis once patient is walking > 100 feet with therapy, continue tasulosin 0.4mg QD      Orders:  BMP and Hgb on Thursday  Change tylenol to 1000mg TID scheduled  DC all other tylenol    Total time with patient visit: 47 minutes including discussions about the POC and care coordination with the patient. Greater than 50% of total time spent with counseling and coordinating care due to reviewed internal medicine and critical care progress  notes, medications, lab results and POC. Discussed pain control and urinary retention at length with patient at bedside. Discussed POC with nursing at facility.   Electronically signed by:  Tonya Lynn Haase, APRN CNP                   Sincerely,        Tonya Lynn Haase, APRN CNP

## 2024-10-07 NOTE — PROGRESS NOTES
[facility] GERIATRICS    PRIMARY CARE PROVIDER AND CLINIC:  Blaise Rodriguez, APRN CNP, [address] [facility] [address]  Chief Complaint   Patient presents with    Hospital F/U      [facility] Medical Record Number:  [id number]  Place of Service where encounter took place:  [facility]) [25]    Chris Bhakta  is a 78 year old  (1945), admitted to the above facility from  [facility]. Hospital stay 10/1/24 through 10/5/24..   HPI:    PMH of CAD, CABG 2009, HTN, CHF, CKD who presented to hospital for elective left YELENA, post op complications included hypotension/shock requiring and ICU stay  Left YELENA 10/1/24  Post op hypothension requiring fluid resuscitation and pressors ICU.   Elevated troponin, cardiology assessed, Echo wnl with EF 60%, no evidence of ischemia cardiology signed off  On exam today: patient sitting up on edge of bed, states left hip pain is rated 6/10, slept poorly last night, appetite fair and he has not had a BM since surgery. Denies CP, palpitations, SOB, N/V/D     CODE STATUS/ADVANCE DIRECTIVES DISCUSSION:  Prior  DNR / DNI  ALLERGIES:   Allergies   Allergen Reactions    Iodinated Contrast Media Shortness Of Breath    Atorvastatin Fatigue    Lisinopril Cough    Oxycodone Confusion    Sertraline Dizziness      PAST MEDICAL HISTORY:   Past Medical History:   Diagnosis Date    Anxiety disorder     BPH (benign prostatic hyperplasia)     CAD (coronary artery disease)     Cardiomyopathy (H)     Cataract     Cervical cord myelomalacia (H)     CHF (congestive heart failure) (H)     Chronic ischemic heart disease     Chronic pain syndrome with opioid dependence     CKD (chronic kidney disease) stage 3, GFR 30-59 ml/min (H)     Closed comminuted intertrochanteric fracture of proximal end of left femur (H)     Closed fracture of neck of left femur (H)     Congenital multiple renal cysts     Cyst of right kidney     Dysarthria      Dyslipidemia     Dysphagia     Dyspnea     Exudative age-related macular degeneration of right eye with inactive choroidal neovascularization (H)     Femur fracture, left 09/2024    surgery in october    Folic acid deficiency     GERD (gastroesophageal reflux disease)     Hyperglycemia     Hyperlipidemia     Hypertension     Insomnia     Macular degeneration     MDD (major depressive disorder)     Mixed hyperlipidemia     Obesity     RAYMOND (obstructive sleep apnea)     Osteoarthritis     Osteoporosis     Pathological fracture of left femur due to age-related osteoporosis (H)     Primary osteoarthritis of right knee     PVD (peripheral vascular disease) (H)     Renal insufficiency syndrome     SAH (subarachnoid hemorrhage) (H)     Somnolence     Spinal stenosis     TBI (traumatic brain injury) (H)     Toxic encephalopathy     Type 2 diabetes mellitus (H)     Urinary incontinence       PAST SURGICAL HISTORY:   has a past surgical history that includes CORONARY ARTERY BYPASS GRAFT (2003); PTCA OF LAD (FOR FAILED GRAFT FAILURE) (2004); CARPAL TUNNEL RELEASE; Lumbar Spine Surgery (2009); Cervical Spine Surgery (2009); Ulnar Tunnel Release (2012); Cervical Fusion; LUMBAR DECOMPRESSION L3-S1 (05/2010); KIDNEY CYST REMOVAL; TOTAL HIP ARTHROPLASTY (Right, 02/21/2020); Arthroplasty hip (Left, 10/1/2024); and Remove hardware hip nailing (Left, 10/1/2024).  FAMILY HISTORY: family history is not on file.  SOCIAL HISTORY:   reports that he quit smoking about 7 weeks ago. His smoking use included cigarettes. He started smoking about 54 years ago. He has a 26.9 pack-year smoking history. He has never used smokeless tobacco. He reports that he does not currently use alcohol.  Patient's living condition: lives in an assisted living facility    Post Discharge Medication Reconciliation Status:   MED REC REQUIRED  Post Medication Reconciliation Status: discharge medications reconciled and changed, per note/orders       Current Outpatient  Medications   Medication Sig Dispense Refill    acetaminophen (TYLENOL) 325 MG tablet Take 2 tablets (650 mg) by mouth every 4 hours as needed for other (mild pain). 100 tablet 0    acetaminophen (TYLENOL) 500 MG tablet Take 500 mg by mouth 3 times daily      bisacodyl (DULCOLAX) 10 MG suppository Place 10 mg rectally daily as needed for constipation.      carvedilol (COREG) 12.5 MG tablet Take 1 tablet (12.5 mg) by mouth 2 times daily (with meals).      DULoxetine (CYMBALTA) 30 MG capsule Take 30 mg by mouth 2 times daily. 0900 & 1900      empagliflozin (JARDIANCE) 10 MG TABS tablet Take 10 mg by mouth daily.      enoxaparin ANTICOAGULANT (LOVENOX) 40 MG/0.4ML syringe Inject 0.4 mLs (40 mg) subcutaneously every 24 hours for 14 days. 5.6 mL 0    folic acid (FOLVITE) 1 MG tablet Take 1 mg by mouth daily.      furosemide (LASIX) 20 MG tablet Take 10 mg by mouth daily. ([id number])      HYDROmorphone (DILAUDID) 2 MG tablet Take 1-2 tablets (2-4 mg) by mouth every 4 hours as needed for moderate pain. 26 tablet 0    hydrOXYzine HCl (ATARAX) 10 MG tablet Take 1 tablet (10 mg) by mouth every 6 hours as needed for other (adjuvant pain). 30 tablet 0    latanoprost (XALATAN) 0.005 % ophthalmic solution Place 1 drop into both eyes at bedtime.      metFORMIN (GLUCOPHAGE) 500 MG tablet Take 1 tablet (500 mg) by mouth daily (with breakfast). Diabetes mellitus      nystatin (MYCOSTATIN) 839092 UNIT/GM external powder Apply topically 2 times daily as needed for other (Rash).      ondansetron (ZOFRAN ODT) 4 MG ODT tab Take 1 tablet (4 mg) by mouth every 6 hours as needed for nausea or vomiting. 30 tablet 0    polyethylene glycol (MIRALAX) 17 g packet Take 1 packet by mouth daily as needed for constipation      sacubitril-valsartan (ENTRESTO) 24-26 MG per tablet Take 1 tablet by mouth 2 times daily. 0900 & 1900  hypertension      senna-docusate (SENOKOT-S/PERICOLACE) 8.6-50 MG tablet Take 2 tablets by mouth daily.      tamsulosin  "(FLOMAX) 0.4 MG 24 hr capsule Take 0.4 mg by mouth every morning      trolamine salicylate (ASPERCREME) 10 % external cream Apply topically as needed for moderate pain.      vitamin D3 (CHOLECALCIFEROL) 50 mcg (2000 units) tablet Take 1 tablet by mouth daily.       No current facility-administered medications for this visit.       ROS:  10 point ROS of systems including Constitutional, Eyes, Respiratory, Cardiovascular, Gastroenterology, Genitourinary, Integumentary, Musculoskeletal, Psychiatric were all negative except for pertinent positives noted in my HPI.    Vitals:  /67   Pulse 76   Temp 98.7  F (37.1  C)   Resp 18   Ht 1.549 m (5' 1\")   Wt 79.4 kg (175 lb)   SpO2 93%   BMI 33.07 kg/m    Exam:  GENERAL APPEARANCE:  Alert, in no distress  ENT:  Mouth and posterior oropharynx normal, moist mucous membranes, [place]  EYES:  EOM, conjunctivae, lids, pupils and irises normal, PERRL  RESP:  respiratory effort and palpation of chest normal, lungs clear to auscultation , no respiratory distress  CV:  regular rate and rhythm, no murmur, rub, or gallop, no edema  ABDOMEN:  normal bowel sounds, soft, nontender, no hepatosplenomegaly or other masses  M/S:   patient sitting up on edge of bed  SKIN:  Inspection of skin and subcutaneous tissue baseline, did not visualize surgical incision  NEURO:   speech wnl  PSYCH:  affect and mood normal    Lab/Diagnostic data:  Recent labs in [facility] reviewed by me today.  and Most Recent 3 CBC's:  Recent Labs   Lab Test 10/05/24  0719 10/04/24  0758 10/03/24  0543   WBC 8.6 10.5 8.2   HGB 13.4 13.6 11.2*    99 101*    153 122*     Most Recent 3 BMP's:  Recent Labs   Lab Test 10/05/24  1129 10/05/24  0808 10/05/24  0719 10/04/24  1113 10/04/24  0758 10/03/24  1105 10/03/24  0543   NA  --   --  141  --  137  --  137   POTASSIUM  --   --  4.6  --  5.1  --  5.0   CHLORIDE  --   --  103  --  101  --  106   CO2  --   --  23  --  22  --  24   BUN  --   --  31.9*  --  22.6  " --  31.0*   CR  --   --  0.93  --  0.86  --  1.12   ANIONGAP  --   --  15  --  14  --  7   VIVIAN  --   --  9.7  --  9.6  --  9.1   * 135* 124*   < > 138*   < > 141*  141*    < > = values in this interval not displayed.       ASSESSMENT/PLAN:    (M16.12) Primary osteoarthritis of left hip  (primary encounter diagnosis)  (R53.81) Physical deconditioning  Comment: acute/ongoing s/p left YELENA 10/1/24  Plan: PT and OT, tylenol 500mg TID, dilaudid 2-4mg q 4 hours prn, hydroxyzine 10mg q 6 hours prn,   Lovenox 40mg SQ QD for 14 days  F/u with ortho as directed    (I25.10) Coronary artery disease involving native coronary artery of native heart without angina pectoris  (I50.32) Chronic diastolic heart failure (H)  (I10) Benign essential hypertension  (N18.30) Stage 3 chronic kidney disease, unspecified whether stage 3a or 3b CKD (H)  Comment: acute/ongoing   Required ICU stay for hypotension/shock post op, elevated troponin, no ischemic findings per cardiology EF of 60%  Plan: BMP follow, continue coreg 12.5mg BID, entresto 24-26mg BID, jardiance 10mg QD, lasix 10mg QD    (D62) Anemia due to blood loss, acute  Comment: acute/ongoing  Plan: Hgb follow    (K59.03) Drug-induced constipation  Comment: acute/ongoing  Plan: continue senna s 2 PO QD, miralax 17gm QD prn    (R33.9) urinary retention  Acute/ongoing  Plan: willis cares, will attempt to DC willis once patient is walking > 100 feet with therapy, continue tasulosin 0.4mg QD      Orders:  BMP and Hgb on Thursday  Change tylenol to 1000mg TID scheduled  DC all other tylenol    Total time with patient visit: 47 minutes including discussions about the POC and care coordination with the patient. Greater than 50% of total time spent with counseling and coordinating care due to reviewed internal medicine and critical care progress notes, medications, lab results and POC. Discussed pain control and urinary retention at length with patient at bedside. Discussed POC with  nursing at facility.   Electronically signed by:  Tonya Lynn Haase, APRN CNP

## 2024-10-08 ENCOUNTER — DOCUMENTATION ONLY (OUTPATIENT)
Dept: GERIATRICS | Facility: CLINIC | Age: 79
End: 2024-10-08
Payer: COMMERCIAL

## 2024-10-09 DIAGNOSIS — Z96.642 HISTORY OF REVISION OF TOTAL REPLACEMENT OF LEFT HIP JOINT: ICD-10-CM

## 2024-10-09 RX ORDER — HYDROMORPHONE HYDROCHLORIDE 2 MG/1
2-4 TABLET ORAL EVERY 4 HOURS PRN
Qty: 30 TABLET | Refills: 0 | Status: SHIPPED | OUTPATIENT
Start: 2024-10-09 | End: 2024-10-14

## 2024-10-10 ENCOUNTER — DOCUMENTATION ONLY (OUTPATIENT)
Dept: GERIATRICS | Facility: CLINIC | Age: 79
End: 2024-10-10
Payer: COMMERCIAL

## 2024-10-14 DIAGNOSIS — Z96.642 HISTORY OF REVISION OF TOTAL REPLACEMENT OF LEFT HIP JOINT: ICD-10-CM

## 2024-10-14 RX ORDER — HYDROMORPHONE HYDROCHLORIDE 2 MG/1
2-4 TABLET ORAL EVERY 4 HOURS PRN
Qty: 30 TABLET | Refills: 0 | Status: SHIPPED | OUTPATIENT
Start: 2024-10-14 | End: 2024-10-17

## 2024-10-15 ENCOUNTER — TRANSITIONAL CARE UNIT VISIT (OUTPATIENT)
Dept: GERIATRICS | Facility: CLINIC | Age: 79
End: 2024-10-15
Payer: COMMERCIAL

## 2024-10-15 VITALS
BODY MASS INDEX: 27.47 KG/M2 | OXYGEN SATURATION: 90 % | TEMPERATURE: 97.7 F | RESPIRATION RATE: 16 BRPM | HEART RATE: 73 BPM | WEIGHT: 175 LBS | SYSTOLIC BLOOD PRESSURE: 95 MMHG | DIASTOLIC BLOOD PRESSURE: 59 MMHG | HEIGHT: 67 IN

## 2024-10-15 DIAGNOSIS — N18.30 STAGE 3 CHRONIC KIDNEY DISEASE, UNSPECIFIED WHETHER STAGE 3A OR 3B CKD (H): ICD-10-CM

## 2024-10-15 DIAGNOSIS — K59.03 DRUG-INDUCED CONSTIPATION: ICD-10-CM

## 2024-10-15 DIAGNOSIS — R33.9 URINARY RETENTION: ICD-10-CM

## 2024-10-15 DIAGNOSIS — I50.32 CHRONIC DIASTOLIC HEART FAILURE (H): ICD-10-CM

## 2024-10-15 DIAGNOSIS — I25.10 CORONARY ARTERY DISEASE INVOLVING NATIVE CORONARY ARTERY OF NATIVE HEART WITHOUT ANGINA PECTORIS: ICD-10-CM

## 2024-10-15 DIAGNOSIS — R53.81 PHYSICAL DECONDITIONING: ICD-10-CM

## 2024-10-15 DIAGNOSIS — M16.12 PRIMARY OSTEOARTHRITIS OF LEFT HIP: Primary | ICD-10-CM

## 2024-10-15 DIAGNOSIS — Z96.642 HISTORY OF REVISION OF TOTAL REPLACEMENT OF LEFT HIP JOINT: ICD-10-CM

## 2024-10-15 DIAGNOSIS — D62 ANEMIA DUE TO BLOOD LOSS, ACUTE: ICD-10-CM

## 2024-10-15 DIAGNOSIS — I10 BENIGN ESSENTIAL HYPERTENSION: ICD-10-CM

## 2024-10-15 PROCEDURE — 99310 SBSQ NF CARE HIGH MDM 45: CPT | Performed by: NURSE PRACTITIONER

## 2024-10-15 RX ORDER — HYDROMORPHONE HYDROCHLORIDE 2 MG/1
2-4 TABLET ORAL EVERY 4 HOURS PRN
Qty: 30 TABLET | OUTPATIENT
Start: 2024-10-15

## 2024-10-15 NOTE — LETTER
" 10/15/2024      Chris Bhakta  [address] [place] [address]        [facility] GERIATRICS    Chief Complaint   Patient presents with     RECHECK     HPI:  Chris Bhakta is a 78 year old  (1945), who is being seen today for an episodic care visit at: [facility]) [25]. Today's concern is:   Left hip DJD s/p YELENA on exam today   In therapy patient has been refusing therapy intermittently, able to walk in parallel bars up to 6 steps  CAD/HTN/CKD: BP 95/59, 82/46, 105/65 with HR 60-70 range, denies CP, palpitations, SOB  Anemia Hgb 11.9 on 10/10/24  Constipation patient states he has not had a BM in a few days, nursing notes state patient did have a suppository on 10/14 and it was effective  Urinary retention continues with willis catheter in place, denies pain or discomfort    Allergies, and PMH/PSH reviewed in [facility] today.  REVIEW OF SYSTEMS:  10 point ROS of systems including Constitutional, Eyes, Respiratory, Cardiovascular, Gastroenterology, Genitourinary, Integumentary, Musculoskeletal, Psychiatric were all negative except for pertinent positives noted in my HPI.    Objective:   BP 95/59   Pulse 73   Temp 97.7  F (36.5  C)   Resp 16   Ht 1.702 m (5' 7\")   Wt 79.4 kg (175 lb)   SpO2 90%   BMI 27.41 kg/m    GENERAL APPEARANCE:  Alert, in no distress  ENT:  Mouth and posterior oropharynx normal, moist mucous membranes, [place]  EYES:  EOM, conjunctivae, lids, pupils and irises normal, PERRL  RESP:  respiratory effort and palpation of chest normal, lungs clear to auscultation , no respiratory distress  CV:  regular rate and rhythm, no murmur, rub, or gallop, no edema  ABDOMEN:  normal bowel sounds, soft, nontender, no hepatosplenomegaly or other masses  M/S:   patient sitting up on edge of bed  SKIN:  Inspection of skin and subcutaneous tissue baseline, did not visualize surgical incision  NEURO:   speech wnl  PSYCH:  affect and mood normal    Recent labs in EPIC reviewed by " me today.  and Most Recent 3 CBC's:  Recent Labs   Lab Test 10/05/24  0719 10/04/24  0758 10/03/24  0543   WBC 8.6 10.5 8.2   HGB 13.4 13.6 11.2*    99 101*    153 122*     Most Recent 3 BMP's:  Recent Labs   Lab Test 10/05/24  1129 10/05/24  0808 10/05/24  0719 10/04/24  1113 10/04/24  0758 10/03/24  1105 10/03/24  0543   NA  --   --  141  --  137  --  137   POTASSIUM  --   --  4.6  --  5.1  --  5.0   CHLORIDE  --   --  103  --  101  --  106   CO2  --   --  23  --  22  --  24   BUN  --   --  31.9*  --  22.6  --  31.0*   CR  --   --  0.93  --  0.86  --  1.12   ANIONGAP  --   --  15  --  14  --  7   VIVIAN  --   --  9.7  --  9.6  --  9.1   * 135* 124*   < > 138*   < > 141*  141*    < > = values in this interval not displayed.       Assessment/Plan:  (M16.12) Primary osteoarthritis of left hip  (primary encounter diagnosis)  (R53.81) Physical deconditioning  Comment: acute/ongoing s/p left YELENA 10/1/24  No change  Plan: PT and OT, tylenol 500mg TID, dilaudid 2-4mg q 4 hours prn, hydroxyzine 10mg q 6 hours prn,   Lovenox 40mg SQ QD for 14 days  F/u with ortho as directed     (I25.10) Coronary artery disease involving native coronary artery of native heart without angina pectoris  (I50.32) Chronic diastolic heart failure (H)  (I10) Benign essential hypertension  (N18.30) Stage 3 chronic kidney disease, unspecified whether stage 3a or 3b CKD (H)  Comment: acute/ongoing , no change  Required ICU stay for hypotension/shock post op, elevated troponin, no ischemic findings per cardiology EF of 60%  Plan: BMP follow, continue coreg 12.5mg BID, entresto 24-26mg BID, jardiance 10mg QD, lasix 10mg QD     (D62) Anemia due to blood loss, acute  Comment: acute/ongoing  Hgb 11.9 on 10/10/24  Plan: Hgb follow     (K59.03) Drug-induced constipation  Comment: acute/ongoing  Plan: increase senna s 2 PO BID, continue miralax 17gm QD prn     (R33.9) urinary retention  Acute/ongoing, no change  Plan: willis cares, will  attempt to DC willis once patient is walking > 100 feet with therapy, continue tasulosin 0.4mg QD       MED REC REQUIRED  Post Medication Reconciliation Status: medication reconcilation previously completed during another office visit      Orders:  Increase senna s to 2 PO BID    Total time with patient visit: 47 minutes including discussions about the POC and care coordination with the patient. Greater than 50% of total time spent with counseling and coordinating care due to reviewed nursing and therapy progress notes, lab results and medications, discussed bowel medications and progress in therapy with patient at bedside. .     Electronically signed by: Tonya Lynn Haase, APRN CNP         Sincerely,        Tonya Lynn Haase, APRN CNP

## 2024-10-15 NOTE — PROGRESS NOTES
"[facility] GERIATRICS    Chief Complaint   Patient presents with    RECHECK     HPI:  Chris Bhakta is a 78 year old  (1945), who is being seen today for an episodic care visit at: [facility] ([facility]) [25]. Today's concern is:   Left hip DJD s/p YELENA on exam today   In therapy patient has been refusing therapy intermittently, able to walk in parallel bars up to 6 steps  CAD/HTN/CKD: BP 95/59, 82/46, 105/65 with HR 60-70 range, denies CP, palpitations, SOB  Anemia Hgb 11.9 on 10/10/24  Constipation patient states he has not had a BM in a few days, nursing notes state patient did have a suppository on 10/14 and it was effective  Urinary retention continues with willis catheter in place, denies pain or discomfort    Allergies, and PMH/PSH reviewed in [facility] today.  REVIEW OF SYSTEMS:  10 point ROS of systems including Constitutional, Eyes, Respiratory, Cardiovascular, Gastroenterology, Genitourinary, Integumentary, Musculoskeletal, Psychiatric were all negative except for pertinent positives noted in my HPI.    Objective:   BP 95/59   Pulse 73   Temp 97.7  F (36.5  C)   Resp 16   Ht 1.702 m (5' 7\")   Wt 79.4 kg (175 lb)   SpO2 90%   BMI 27.41 kg/m    GENERAL APPEARANCE:  Alert, in no distress  ENT:  Mouth and posterior oropharynx normal, moist mucous membranes, [place]  EYES:  EOM, conjunctivae, lids, pupils and irises normal, PERRL  RESP:  respiratory effort and palpation of chest normal, lungs clear to auscultation , no respiratory distress  CV:  regular rate and rhythm, no murmur, rub, or gallop, no edema  ABDOMEN:  normal bowel sounds, soft, nontender, no hepatosplenomegaly or other masses  M/S:   patient sitting up on edge of bed  SKIN:  Inspection of skin and subcutaneous tissue baseline, did not visualize surgical incision  NEURO:   speech wnl  PSYCH:  affect and mood normal    Recent labs in [facility] reviewed by me today.  and Most Recent 3 CBC's:  Recent Labs   Lab Test 10/05/24  0719 " 10/04/24  0758 10/03/24  0543   WBC 8.6 10.5 8.2   HGB 13.4 13.6 11.2*    99 101*    153 122*     Most Recent 3 BMP's:  Recent Labs   Lab Test 10/05/24  1129 10/05/24  0808 10/05/24  0719 10/04/24  1113 10/04/24  0758 10/03/24  1105 10/03/24  0543   NA  --   --  141  --  137  --  137   POTASSIUM  --   --  4.6  --  5.1  --  5.0   CHLORIDE  --   --  103  --  101  --  106   CO2  --   --  23  --  22  --  24   BUN  --   --  31.9*  --  22.6  --  31.0*   CR  --   --  0.93  --  0.86  --  1.12   ANIONGAP  --   --  15  --  14  --  7   VIVIAN  --   --  9.7  --  9.6  --  9.1   * 135* 124*   < > 138*   < > 141*  141*    < > = values in this interval not displayed.       Assessment/Plan:  (M16.12) Primary osteoarthritis of left hip  (primary encounter diagnosis)  (R53.81) Physical deconditioning  Comment: acute/ongoing s/p left YELENA 10/1/24  No change  Plan: PT and OT, tylenol 500mg TID, dilaudid 2-4mg q 4 hours prn, hydroxyzine 10mg q 6 hours prn,   Lovenox 40mg SQ QD for 14 days  F/u with ortho as directed     (I25.10) Coronary artery disease involving native coronary artery of native heart without angina pectoris  (I50.32) Chronic diastolic heart failure (H)  (I10) Benign essential hypertension  (N18.30) Stage 3 chronic kidney disease, unspecified whether stage 3a or 3b CKD (H)  Comment: acute/ongoing , no change  Required ICU stay for hypotension/shock post op, elevated troponin, no ischemic findings per cardiology EF of 60%  Plan: BMP follow, continue coreg 12.5mg BID, entresto 24-26mg BID, jardiance 10mg QD, lasix 10mg QD     (D62) Anemia due to blood loss, acute  Comment: acute/ongoing  Hgb 11.9 on 10/10/24  Plan: Hgb follow     (K59.03) Drug-induced constipation  Comment: acute/ongoing  Plan: increase senna s 2 PO BID, continue miralax 17gm QD prn     (R33.9) urinary retention  Acute/ongoing, no change  Plan: willis cares, will attempt to DC willis once patient is walking > 100 feet with therapy, continue  tasulosin 0.4mg QD       MED REC REQUIRED  Post Medication Reconciliation Status: medication reconcilation previously completed during another office visit      Orders:  Increase senna s to 2 PO BID    Total time with patient visit: 47 minutes including discussions about the POC and care coordination with the patient. Greater than 50% of total time spent with counseling and coordinating care due to reviewed nursing and therapy progress notes, lab results and medications, discussed bowel medications and progress in therapy with patient at bedside. .     Electronically signed by: Tonya Lynn Haase, APRN CNP

## 2024-10-16 NOTE — PROGRESS NOTES
[facility] GERIATRICS    PRIMARY CARE PROVIDER AND CLINIC:  Blaise Rodriguez, MEMO CNP, [address] [address]  Chief Complaint   Patient presents with    Hospital F/U      [facility] Medical Record Number:  [id number]  Place of Service where encounter took place:  [facility] ([facility])     Chris Bhakta  is a 78 year old  (1945), admitted to the above facility from  [facility]. Hospital stay 10/1/24 through 10/5/24..       Hospital course was reviewed by me, is as per the hospital discharge summary and NP note      CODE STATUS/ADVANCE DIRECTIVES DISCUSSION:  Prior  DNR / DNI      PMH of CAD, CABG 2009, HTN, CHF, CKD    Pt was hospitalized for an elective L YELENA  Post op course was complicated by hypotension, requiring fluid resuscitation and pressors. Echo nl  Pt also complicated by urinary retention requiring Diaz catheter placement.    Patient reports moderate hip pain.  He is walking with therapy.  Per chart he has been intermittently refusing therapy  He has been constipated since surgery  He denies cough, chest pain, shortness of breath, nausea, vomiting            Allergies   Allergen Reactions    Iodinated Contrast Media Shortness Of Breath    Atorvastatin Fatigue    Lisinopril Cough    Oxycodone Confusion    Sertraline Dizziness      PAST MEDICAL HISTORY:   Past Medical History:   Diagnosis Date    Anxiety disorder     BPH (benign prostatic hyperplasia)     CAD (coronary artery disease)     Cardiomyopathy (H)     Cataract     Cervical cord myelomalacia (H)     CHF (congestive heart failure) (H)     Chronic ischemic heart disease     Chronic pain syndrome with opioid dependence     CKD (chronic kidney disease) stage 3, GFR 30-59 ml/min (H)     Closed comminuted intertrochanteric fracture of proximal end of left femur (H)     Closed fracture of neck of left femur (H)     Congenital multiple renal cysts     Cyst of right kidney      Dysarthria     Dyslipidemia     Dysphagia     Dyspnea     Exudative age-related macular degeneration of right eye with inactive choroidal neovascularization (H)     Femur fracture, left 09/2024    surgery in october    Folic acid deficiency     GERD (gastroesophageal reflux disease)     Hyperglycemia     Hyperlipidemia     Hypertension     Insomnia     Macular degeneration     MDD (major depressive disorder)     Mixed hyperlipidemia     Obesity     RAYMOND (obstructive sleep apnea)     Osteoarthritis     Osteoporosis     Pathological fracture of left femur due to age-related osteoporosis (H)     Primary osteoarthritis of right knee     PVD (peripheral vascular disease) (H)     Renal insufficiency syndrome     SAH (subarachnoid hemorrhage) (H)     Somnolence     Spinal stenosis     TBI (traumatic brain injury) (H)     Toxic encephalopathy     Type 2 diabetes mellitus (H)     Urinary incontinence       PAST SURGICAL HISTORY:   has a past surgical history that includes CORONARY ARTERY BYPASS GRAFT (2003); PTCA OF LAD (FOR FAILED GRAFT FAILURE) (2004); CARPAL TUNNEL RELEASE; Lumbar Spine Surgery (2009); Cervical Spine Surgery (2009); Ulnar Tunnel Release (2012); Cervical Fusion; LUMBAR DECOMPRESSION L3-S1 (05/2010); KIDNEY CYST REMOVAL; TOTAL HIP ARTHROPLASTY (Right, 02/21/2020); Arthroplasty hip (Left, 10/1/2024); and Remove hardware hip nailing (Left, 10/1/2024).  FAMILY HISTORY: family history is not on file.  SOCIAL HISTORY:   reports that he quit smoking about 2 months ago. His smoking use included cigarettes. He started smoking about 54 years ago. He has a 26.9 pack-year smoking history. He has never used smokeless tobacco. He reports that he does not currently use alcohol.  Patient's living condition: lives in an assisted living facility    Current medications were reviewed by me today    Current Outpatient Medications   Medication Sig Dispense Refill    acetaminophen (TYLENOL) 500 MG tablet Take 1,000 mg by mouth 3  times daily.      bisacodyl (DULCOLAX) 10 MG suppository Place 10 mg rectally daily as needed for constipation.      carvedilol (COREG) 12.5 MG tablet Take 1 tablet (12.5 mg) by mouth 2 times daily (with meals).      DULoxetine (CYMBALTA) 30 MG capsule Take 30 mg by mouth 2 times daily. 0900 & 1900      empagliflozin (JARDIANCE) 10 MG TABS tablet Take 10 mg by mouth daily.      enoxaparin ANTICOAGULANT (LOVENOX) 40 MG/0.4ML syringe Inject 0.4 mLs (40 mg) subcutaneously every 24 hours for 14 days. 5.6 mL 0    folic acid (FOLVITE) 1 MG tablet Take 1 mg by mouth daily.      furosemide (LASIX) 20 MG tablet Take 10 mg by mouth daily. ([id number])      HYDROmorphone (DILAUDID) 2 MG tablet Take 1-2 tablets (2-4 mg) by mouth every 4 hours as needed for moderate pain. 30 tablet 0    hydrOXYzine HCl (ATARAX) 10 MG tablet Take 1 tablet (10 mg) by mouth every 6 hours as needed for other (adjuvant pain). 30 tablet 0    latanoprost (XALATAN) 0.005 % ophthalmic solution Place 1 drop into both eyes at bedtime.      metFORMIN (GLUCOPHAGE) 500 MG tablet Take 1 tablet (500 mg) by mouth daily (with breakfast). Diabetes mellitus      nystatin (MYCOSTATIN) 033335 UNIT/GM external powder Apply topically 2 times daily as needed for other (Rash).      ondansetron (ZOFRAN ODT) 4 MG ODT tab Take 1 tablet (4 mg) by mouth every 6 hours as needed for nausea or vomiting. 30 tablet 0    polyethylene glycol (MIRALAX) 17 g packet Take 1 packet by mouth daily as needed for constipation      sacubitril-valsartan (ENTRESTO) 24-26 MG per tablet Take 1 tablet by mouth 2 times daily. 0900 & 1900  hypertension      senna-docusate (SENOKOT-S/PERICOLACE) 8.6-50 MG tablet Take 2 tablets by mouth 2 times daily.      tamsulosin (FLOMAX) 0.4 MG 24 hr capsule Take 0.4 mg by mouth every morning      trolamine salicylate (ASPERCREME) 10 % external cream Apply topically as needed for moderate pain.      vitamin D3 (CHOLECALCIFEROL) 50 mcg (2000 units) tablet Take  "1 tablet by mouth daily.       No current facility-administered medications for this visit.       ROS:  10 point ROS of systems including Constitutional, Eyes, Respiratory, Cardiovascular, Gastroenterology, Genitourinary, Integumentary, Musculoskeletal, Psychiatric were all negative except for pertinent positives noted in my HPI.    Vitals:  BP (!) 146/65   Pulse 94   Temp 97.4  F (36.3  C)   Resp 16   Ht 1.702 m (5' 7\")   Wt 77.7 kg (171 lb 4.8 oz)   SpO2 95%   BMI 26.83 kg/m    Exam:  Well-nourished appearing male, sitting up in bed  Appears comfortable  HEENT: Oral mucosa moist  Lungs clear  CV regular rate and rhythm  Abdomen soft, nondistended, nontender  Extremities: Left hip incision was not examined by me today.  No lower extremity edema.  Diaz catheter in place  Neuro: Alert, fully oriented.  Speech is soft.  Facies appear somewhat masked.  No tremor or rigidity    Lab/Diagnostic data:  Hemoglobin 11.9 on 10/10/2024    ASSESSMENT/PLAN:    Status post left total hip arthroplasty for the treatment of osteoarthritis of the hip  Fair pain control  Slow progress in rehab in part related to pain  Plan: Continue therapies.  Dilaudid as needed.  Enoxaparin for 14-day total course.  Orthopedics follow-up    History of coronary artery disease  Chronic diastolic heart failure  History of hypertension with postop hypotension without evidence of primary cardiac process  Plan: Continue carvedilol, Entresto, Jardiance, low-dose furosemide.  Monitor vital signs, BMP    Urinary retention postop  Diaz catheter remains in place  Plan: Treat constipation, judicious use of opioids, tamsulosin.  Retrial voiding when patient is more ambulatory    Acute blood loss anemia  Mild, asymptomatic  Plan: Monitor vital signs, monitor for symptoms of progressive anemia      Diogenes Lombardo MD        "

## 2024-10-17 ENCOUNTER — TRANSITIONAL CARE UNIT VISIT (OUTPATIENT)
Dept: GERIATRICS | Facility: CLINIC | Age: 79
End: 2024-10-17
Payer: COMMERCIAL

## 2024-10-17 VITALS
HEART RATE: 94 BPM | DIASTOLIC BLOOD PRESSURE: 65 MMHG | TEMPERATURE: 97.4 F | OXYGEN SATURATION: 95 % | BODY MASS INDEX: 26.89 KG/M2 | HEIGHT: 67 IN | RESPIRATION RATE: 16 BRPM | SYSTOLIC BLOOD PRESSURE: 146 MMHG | WEIGHT: 171.3 LBS

## 2024-10-17 DIAGNOSIS — I50.32 CHRONIC DIASTOLIC HEART FAILURE (H): ICD-10-CM

## 2024-10-17 DIAGNOSIS — R33.9 URINARY RETENTION: ICD-10-CM

## 2024-10-17 DIAGNOSIS — D62 ANEMIA DUE TO BLOOD LOSS, ACUTE: ICD-10-CM

## 2024-10-17 DIAGNOSIS — Z96.642 HISTORY OF REVISION OF TOTAL REPLACEMENT OF LEFT HIP JOINT: ICD-10-CM

## 2024-10-17 DIAGNOSIS — M16.12 PRIMARY OSTEOARTHRITIS OF LEFT HIP: Primary | ICD-10-CM

## 2024-10-17 PROCEDURE — 99305 1ST NF CARE MODERATE MDM 35: CPT | Performed by: INTERNAL MEDICINE

## 2024-10-17 RX ORDER — HYDROMORPHONE HYDROCHLORIDE 2 MG/1
2-4 TABLET ORAL EVERY 4 HOURS PRN
Qty: 30 TABLET | Refills: 0 | Status: ON HOLD | OUTPATIENT
Start: 2024-10-17 | End: 2024-11-04

## 2024-10-17 NOTE — LETTER
10/17/2024      Chris Bhakta  [address] [place] [address]        [facility] GERIATRICS    PRIMARY CARE PROVIDER AND CLINIC:  [facility], [address] [facility] [address]  Chief Complaint   Patient presents with     Hospital F/U      [facility] Medical Record Number:  [id number]  Place of Service where encounter took place:  [facility] (TCU)     Chris Bhakta  is a 78 year old  (1945), admitted to the above facility from  [facility]. Hospital stay 10/1/24 through 10/5/24..       Hospital course was reviewed by me, is as per the hospital discharge summary and NP note      CODE STATUS/ADVANCE DIRECTIVES DISCUSSION:  Prior  DNR / DNI      PMH of CAD, CABG 2009, HTN, CHF, CKD    Pt was hospitalized for an elective L YELENA  Post op course was complicated by hypotension, requiring fluid resuscitation and pressors. Echo nl  Pt also complicated by urinary retention requiring Diaz catheter placement.    Patient reports moderate hip pain.  He is walking with therapy.  Per chart he has been intermittently refusing therapy  He has been constipated since surgery  He denies cough, chest pain, shortness of breath, nausea, vomiting            Allergies   Allergen Reactions     Iodinated Contrast Media Shortness Of Breath     Atorvastatin Fatigue     Lisinopril Cough     Oxycodone Confusion     Sertraline Dizziness      PAST MEDICAL HISTORY:   Past Medical History:   Diagnosis Date     Anxiety disorder      BPH (benign prostatic hyperplasia)      CAD (coronary artery disease)      Cardiomyopathy (H)      Cataract      Cervical cord myelomalacia (H)      CHF (congestive heart failure) (H)      Chronic ischemic heart disease      Chronic pain syndrome with opioid dependence      CKD (chronic kidney disease) stage 3, GFR 30-59 ml/min (H)      Closed comminuted intertrochanteric fracture of proximal end of left femur (H)      Closed fracture  of neck of left femur (H)      Congenital multiple renal cysts      Cyst of right kidney      Dysarthria      Dyslipidemia      Dysphagia      Dyspnea      Exudative age-related macular degeneration of right eye with inactive choroidal neovascularization (H)      Femur fracture, left 09/2024    surgery in october     Folic acid deficiency      GERD (gastroesophageal reflux disease)      Hyperglycemia      Hyperlipidemia      Hypertension      Insomnia      Macular degeneration      MDD (major depressive disorder)      Mixed hyperlipidemia      Obesity      RAYMOND (obstructive sleep apnea)      Osteoarthritis      Osteoporosis      Pathological fracture of left femur due to age-related osteoporosis (H)      Primary osteoarthritis of right knee      PVD (peripheral vascular disease) (H)      Renal insufficiency syndrome      SAH (subarachnoid hemorrhage) (H)      Somnolence      Spinal stenosis      TBI (traumatic brain injury) (H)      Toxic encephalopathy      Type 2 diabetes mellitus (H)      Urinary incontinence       PAST SURGICAL HISTORY:   has a past surgical history that includes CORONARY ARTERY BYPASS GRAFT (2003); PTCA OF LAD (FOR FAILED GRAFT FAILURE) (2004); CARPAL TUNNEL RELEASE; Lumbar Spine Surgery (2009); Cervical Spine Surgery (2009); Ulnar Tunnel Release (2012); Cervical Fusion; LUMBAR DECOMPRESSION L3-S1 (05/2010); KIDNEY CYST REMOVAL; TOTAL HIP ARTHROPLASTY (Right, 02/21/2020); Arthroplasty hip (Left, 10/1/2024); and Remove hardware hip nailing (Left, 10/1/2024).  FAMILY HISTORY: family history is not on file.  SOCIAL HISTORY:   reports that he quit smoking about 2 months ago. His smoking use included cigarettes. He started smoking about 54 years ago. He has a 26.9 pack-year smoking history. He has never used smokeless tobacco. He reports that he does not currently use alcohol.  Patient's living condition: lives in an assisted living facility    Current medications were reviewed by me today    Current  Outpatient Medications   Medication Sig Dispense Refill     acetaminophen (TYLENOL) 500 MG tablet Take 1,000 mg by mouth 3 times daily.       bisacodyl (DULCOLAX) 10 MG suppository Place 10 mg rectally daily as needed for constipation.       carvedilol (COREG) 12.5 MG tablet Take 1 tablet (12.5 mg) by mouth 2 times daily (with meals).       DULoxetine (CYMBALTA) 30 MG capsule Take 30 mg by mouth 2 times daily. 0900 & 1900       empagliflozin (JARDIANCE) 10 MG TABS tablet Take 10 mg by mouth daily.       enoxaparin ANTICOAGULANT (LOVENOX) 40 MG/0.4ML syringe Inject 0.4 mLs (40 mg) subcutaneously every 24 hours for 14 days. 5.6 mL 0     folic acid (FOLVITE) 1 MG tablet Take 1 mg by mouth daily.       furosemide (LASIX) 20 MG tablet Take 10 mg by mouth daily. (0.2x92bw=82vp)       HYDROmorphone (DILAUDID) 2 MG tablet Take 1-2 tablets (2-4 mg) by mouth every 4 hours as needed for moderate pain. 30 tablet 0     hydrOXYzine HCl (ATARAX) 10 MG tablet Take 1 tablet (10 mg) by mouth every 6 hours as needed for other (adjuvant pain). 30 tablet 0     latanoprost (XALATAN) 0.005 % ophthalmic solution Place 1 drop into both eyes at bedtime.       metFORMIN (GLUCOPHAGE) 500 MG tablet Take 1 tablet (500 mg) by mouth daily (with breakfast). Diabetes mellitus       nystatin (MYCOSTATIN) 788720 UNIT/GM external powder Apply topically 2 times daily as needed for other (Rash).       ondansetron (ZOFRAN ODT) 4 MG ODT tab Take 1 tablet (4 mg) by mouth every 6 hours as needed for nausea or vomiting. 30 tablet 0     polyethylene glycol (MIRALAX) 17 g packet Take 1 packet by mouth daily as needed for constipation       sacubitril-valsartan (ENTRESTO) 24-26 MG per tablet Take 1 tablet by mouth 2 times daily. 0900 & 1900  hypertension       senna-docusate (SENOKOT-S/PERICOLACE) 8.6-50 MG tablet Take 2 tablets by mouth 2 times daily.       tamsulosin (FLOMAX) 0.4 MG 24 hr capsule Take 0.4 mg by mouth every morning       trolamine salicylate  "(ASPERCREME) 10 % external cream Apply topically as needed for moderate pain.       vitamin D3 (CHOLECALCIFEROL) 50 mcg (2000 units) tablet Take 1 tablet by mouth daily.       No current facility-administered medications for this visit.       ROS:  10 point ROS of systems including Constitutional, Eyes, Respiratory, Cardiovascular, Gastroenterology, Genitourinary, Integumentary, Musculoskeletal, Psychiatric were all negative except for pertinent positives noted in my HPI.    Vitals:  BP (!) 146/65   Pulse 94   Temp 97.4  F (36.3  C)   Resp 16   Ht 1.702 m (5' 7\")   Wt 77.7 kg (171 lb 4.8 oz)   SpO2 95%   BMI 26.83 kg/m    Exam:  Well-nourished appearing male, sitting up in bed  Appears comfortable  HEENT: Oral mucosa moist  Lungs clear  CV regular rate and rhythm  Abdomen soft, nondistended, nontender  Extremities: Left hip incision was not examined by me today.  No lower extremity edema.  Diaz catheter in place  Neuro: Alert, fully oriented.  Speech is soft.  Facies appear somewhat masked.  No tremor or rigidity    Lab/Diagnostic data:  Hemoglobin 11.9 on 10/10/2024    ASSESSMENT/PLAN:    Status post left total hip arthroplasty for the treatment of osteoarthritis of the hip  Fair pain control  Slow progress in rehab in part related to pain  Plan: Continue therapies.  Dilaudid as needed.  Enoxaparin for 14-day total course.  Orthopedics follow-up    History of coronary artery disease  Chronic diastolic heart failure  History of hypertension with postop hypotension without evidence of primary cardiac process  Plan: Continue carvedilol, Entresto, Jardiance, low-dose furosemide.  Monitor vital signs, BMP    Urinary retention postop  Diaz catheter remains in place  Plan: Treat constipation, judicious use of opioids, tamsulosin.  Retrial voiding when patient is more ambulatory    Acute blood loss anemia  Mild, asymptomatic  Plan: Monitor vital signs, monitor for symptoms of progressive anemia      Diogenes BARRON" MD Gr[facility]            Sincerely,        Diogenes Lombardo MD

## 2024-10-23 ENCOUNTER — TRANSITIONAL CARE UNIT VISIT (OUTPATIENT)
Dept: GERIATRICS | Facility: CLINIC | Age: 79
End: 2024-10-23
Payer: COMMERCIAL

## 2024-10-23 VITALS
WEIGHT: 171 LBS | DIASTOLIC BLOOD PRESSURE: 63 MMHG | HEIGHT: 67 IN | RESPIRATION RATE: 16 BRPM | OXYGEN SATURATION: 95 % | TEMPERATURE: 97.7 F | SYSTOLIC BLOOD PRESSURE: 104 MMHG | HEART RATE: 61 BPM | BODY MASS INDEX: 26.84 KG/M2

## 2024-10-23 DIAGNOSIS — R33.9 URINARY RETENTION: ICD-10-CM

## 2024-10-23 DIAGNOSIS — I10 BENIGN ESSENTIAL HYPERTENSION: ICD-10-CM

## 2024-10-23 DIAGNOSIS — D62 ANEMIA DUE TO BLOOD LOSS, ACUTE: ICD-10-CM

## 2024-10-23 DIAGNOSIS — R53.81 PHYSICAL DECONDITIONING: ICD-10-CM

## 2024-10-23 DIAGNOSIS — N18.30 STAGE 3 CHRONIC KIDNEY DISEASE, UNSPECIFIED WHETHER STAGE 3A OR 3B CKD (H): ICD-10-CM

## 2024-10-23 DIAGNOSIS — M16.12 PRIMARY OSTEOARTHRITIS OF LEFT HIP: Primary | ICD-10-CM

## 2024-10-23 DIAGNOSIS — I50.32 CHRONIC DIASTOLIC HEART FAILURE (H): ICD-10-CM

## 2024-10-23 DIAGNOSIS — I25.10 CORONARY ARTERY DISEASE INVOLVING NATIVE CORONARY ARTERY OF NATIVE HEART WITHOUT ANGINA PECTORIS: ICD-10-CM

## 2024-10-23 DIAGNOSIS — K59.03 DRUG-INDUCED CONSTIPATION: ICD-10-CM

## 2024-10-23 PROCEDURE — 99310 SBSQ NF CARE HIGH MDM 45: CPT | Performed by: NURSE PRACTITIONER

## 2024-10-23 RX ORDER — ASPIRIN 81 MG/1
81 TABLET, CHEWABLE ORAL DAILY
Status: ON HOLD
Start: 2024-10-23 | End: 2024-11-04

## 2024-10-23 NOTE — LETTER
" 10/23/2024      Chris Bhakta  [address] [place] [address]        [facility] GERIATRICS    Chief Complaint   Patient presents with     RECHECK     HPI:  Chris Bhakta is a 78 year old  (1945), who is being seen today for an episodic care visit at: [facility]) [25]. Today's concern is:   Left hip DJD s/p YELENA on exam today patient sitting up in w/c at bedside, alert, visiting with Son at bedside, appears comfortable, when asked about pain in hip patient states pain is ongoing c/o 8/10 pain.   In therapy patient  able to walking  in parallel bars up to 8 feet per therapy at bedside  CAD/HTN/CKD: BP  104/63, 100/62, 105/67 with HR 60-70 range, denies CP, palpitations, SOB  Anemia Hgb 11.9 on 10/10/24  Constipation patient states bowels are working, LBM 10/22/24 per nursing notes  Urinary retention continues with willis catheter in place, denies pain or discomfort discussed DC of willis will wait until patient is more ambulatory    Allergies, and PMH/PSH reviewed in EPIC today.  REVIEW OF SYSTEMS:  10 point ROS of systems including Constitutional, Eyes, Respiratory, Cardiovascular, Gastroenterology, Genitourinary, Integumentary, Musculoskeletal, Psychiatric were all negative except for pertinent positives noted in my HPI.    Objective:   /63   Pulse 61   Temp 97.7  F (36.5  C)   Resp 16   Ht 1.702 m (5' 7\")   Wt 77.6 kg (171 lb)   SpO2 95%   BMI 26.78 kg/m    GENERAL APPEARANCE:  Alert, in no distress  ENT:  Mouth and posterior oropharynx normal, moist mucous membranes, [place]  EYES:  EOM, conjunctivae, lids, pupils and irises normal, PERRL  RESP:  respiratory effort and palpation of chest normal, lungs clear to auscultation , no respiratory distress  CV:  regular rate and rhythm, no murmur, rub, or gallop, peripheral edema trace+ in LE bilaterally  ABDOMEN:  normal bowel sounds, soft, nontender, no hepatosplenomegaly or other masses  M/S:   patient sitting up in " w/c  SKIN:  Inspection of skin and subcutaneous tissue baseline, did not viualize surgical incision  NEURO:   speech wnl  PSYCH:  affect and mood normal    Recent labs in [facility] reviewed by me today.  and Most Recent 3 CBC's:  Recent Labs   Lab Test 10/05/24  0719 10/04/24  0758 10/03/24  0543   WBC 8.6 10.5 8.2   HGB 13.4 13.6 11.2*    99 101*    153 122*     Most Recent 3 BMP's:  Recent Labs   Lab Test 10/05/24  1129 10/05/24  0808 10/05/24  0719 10/04/24  1113 10/04/24  0758 10/03/24  1105 10/03/24  0543   NA  --   --  141  --  137  --  137   POTASSIUM  --   --  4.6  --  5.1  --  5.0   CHLORIDE  --   --  103  --  101  --  106   CO2  --   --  23  --  22  --  24   BUN  --   --  31.9*  --  22.6  --  31.0*   CR  --   --  0.93  --  0.86  --  1.12   ANIONGAP  --   --  15  --  14  --  7   VIVIAN  --   --  9.7  --  9.6  --  9.1   * 135* 124*   < > 138*   < > 141*  141*    < > = values in this interval not displayed.       Assessment/Plan:  (M16.12) Primary osteoarthritis of left hip  (primary encounter diagnosis)  (R53.81) Physical deconditioning  Comment: acute/ongoing s/p left YELENA 10/1/24  No change  Plan: PT and OT, tylenol 500mg TID, dilaudid 2-4mg q 4 hours prn, hydroxyzine 10mg q 6 hours prn,   Finished Lovenox  ASA 81mg QD  F/u with ortho as directed     (I25.10) Coronary artery disease involving native coronary artery of native heart without angina pectoris  (I50.32) Chronic diastolic heart failure (H)  (I10) Benign essential hypertension  (N18.30) Stage 3 chronic kidney disease, unspecified whether stage 3a or 3b CKD (H)  Comment: acute/ongoing , no change  Required ICU stay for hypotension/shock post op, elevated troponin, no ischemic findings per cardiology EF of 60%  Plan: BMP follow, continue coreg 12.5mg BID, entresto 24-26mg BID, jardiance 10mg QD, lasix 10mg QD     (D62) Anemia due to blood loss, acute  Comment: acute/ongoing, no change  Hgb 11.9 on 10/10/24  Plan: Hgb follow     (K59.03)  Drug-induced constipation  Comment: acute/ongoing, no change  Plan: increase senna s 2 PO BID, continue miralax 17gm QD prn     (R33.9) urinary retention  Acute/ongoing, no change  Plan: willis cares, will attempt to DC willis once patient is walking > 25 feet with therapy, continue tasulosin 0.4mg QD       MED REC REQUIRED  Post Medication Reconciliation Status: medication reconcilation previously completed during another office visit      Orders:  ASA 81mg QD  BMP and Hgb on Monday    Total time with patient visit: 47 minutes including discussions about the POC and care coordination with the patient. Greater than 50% of total time spent with counseling and coordinating care due to reviewed nursing and therapy progress notes, medications and POC, discussed pain control and progress in therapy with patient at bedside. Discussed POC with nursing at facility.   Electronically signed by: Tonya Lynn Haase, APRN CNP         Sincerely,        Tonya Lynn Haase, APRN CNP

## 2024-10-23 NOTE — PROGRESS NOTES
"[facility] GERIATRICS    Chief Complaint   Patient presents with    RECHECK     HPI:  Chris Bhakta is a 78 year old  (1945), who is being seen today for an episodic care visit at: [facility]) [25]. Today's concern is:   Left hip DJD s/p YELENA on exam today patient sitting up in w/c at bedside, alert, visiting with Son at bedside, appears comfortable, when asked about pain in hip patient states pain is ongoing c/o 8/10 pain.   In therapy patient  able to walking  in parallel bars up to 8 feet per therapy at bedside  CAD/HTN/CKD: BP  104/63, 100/62, 105/67 with HR 60-70 range, denies CP, palpitations, SOB  Anemia Hgb 11.9 on 10/10/24  Constipation patient states bowels are working, LBM 10/22/24 per nursing notes  Urinary retention continues with willis catheter in place, denies pain or discomfort discussed DC of willis will wait until patient is more ambulatory    Allergies, and PMH/PSH reviewed in EPIC today.  REVIEW OF SYSTEMS:  10 point ROS of systems including Constitutional, Eyes, Respiratory, Cardiovascular, Gastroenterology, Genitourinary, Integumentary, Musculoskeletal, Psychiatric were all negative except for pertinent positives noted in my HPI.    Objective:   /63   Pulse 61   Temp 97.7  F (36.5  C)   Resp 16   Ht 1.702 m (5' 7\")   Wt 77.6 kg (171 lb)   SpO2 95%   BMI 26.78 kg/m    GENERAL APPEARANCE:  Alert, in no distress  ENT:  Mouth and posterior oropharynx normal, moist mucous membranes, [place]  EYES:  EOM, conjunctivae, lids, pupils and irises normal, PERRL  RESP:  respiratory effort and palpation of chest normal, lungs clear to auscultation , no respiratory distress  CV:  regular rate and rhythm, no murmur, rub, or gallop, peripheral edema trace+ in LE bilaterally  ABDOMEN:  normal bowel sounds, soft, nontender, no hepatosplenomegaly or other masses  M/S:   patient sitting up in w/c  SKIN:  Inspection of skin and subcutaneous tissue baseline, did not viualize " surgical incision  NEURO:   speech wnl  PSYCH:  affect and mood normal    Recent labs in EPIC reviewed by me today.  and Most Recent 3 CBC's:  Recent Labs   Lab Test 10/05/24  0719 10/04/24  0758 10/03/24  0543   WBC 8.6 10.5 8.2   HGB 13.4 13.6 11.2*    99 101*    153 122*     Most Recent 3 BMP's:  Recent Labs   Lab Test 10/05/24  1129 10/05/24  0808 10/05/24  0719 10/04/24  1113 10/04/24  0758 10/03/24  1105 10/03/24  0543   NA  --   --  141  --  137  --  137   POTASSIUM  --   --  4.6  --  5.1  --  5.0   CHLORIDE  --   --  103  --  101  --  106   CO2  --   --  23  --  22  --  24   BUN  --   --  31.9*  --  22.6  --  31.0*   CR  --   --  0.93  --  0.86  --  1.12   ANIONGAP  --   --  15  --  14  --  7   VIVIAN  --   --  9.7  --  9.6  --  9.1   * 135* 124*   < > 138*   < > 141*  141*    < > = values in this interval not displayed.       Assessment/Plan:  (M16.12) Primary osteoarthritis of left hip  (primary encounter diagnosis)  (R53.81) Physical deconditioning  Comment: acute/ongoing s/p left YELENA 10/1/24  No change  Plan: PT and OT, tylenol 500mg TID, dilaudid 2-4mg q 4 hours prn, hydroxyzine 10mg q 6 hours prn,   Finished Lovenox  ASA 81mg QD  F/u with ortho as directed     (I25.10) Coronary artery disease involving native coronary artery of native heart without angina pectoris  (I50.32) Chronic diastolic heart failure (H)  (I10) Benign essential hypertension  (N18.30) Stage 3 chronic kidney disease, unspecified whether stage 3a or 3b CKD (H)  Comment: acute/ongoing , no change  Required ICU stay for hypotension/shock post op, elevated troponin, no ischemic findings per cardiology EF of 60%  Plan: BMP follow, continue coreg 12.5mg BID, entresto 24-26mg BID, jardiance 10mg QD, lasix 10mg QD     (D62) Anemia due to blood loss, acute  Comment: acute/ongoing, no change  Hgb 11.9 on 10/10/24  Plan: Hgb follow     (K59.03) Drug-induced constipation  Comment: acute/ongoing, no change  Plan: increase  senna s 2 PO BID, continue miralax 17gm QD prn     (R33.9) urinary retention  Acute/ongoing, no change  Plan: willis cares, will attempt to DC willis once patient is walking > 25 feet with therapy, continue tasulosin 0.4mg QD       MED REC REQUIRED  Post Medication Reconciliation Status: medication reconcilation previously completed during another office visit      Orders:  ASA 81mg QD  BMP and Hgb on Monday    Total time with patient visit: 47 minutes including discussions about the POC and care coordination with the patient. Greater than 50% of total time spent with counseling and coordinating care due to reviewed nursing and therapy progress notes, medications and POC, discussed pain control and progress in therapy with patient at bedside. Discussed POC with nursing at facility.   Electronically signed by: Tonya Lynn Haase, APRN CNP

## 2024-10-28 ENCOUNTER — TELEPHONE (OUTPATIENT)
Dept: GERIATRICS | Facility: CLINIC | Age: 79
End: 2024-10-28
Payer: COMMERCIAL

## 2024-10-28 NOTE — PROGRESS NOTES
"[facility] GERIATRICS    Chief Complaint   Patient presents with    RECHECK     HPI:  Chris Bhakta is a 78 year old  (1945), who is being seen today for an episodic care visit at: [facility]) [25]. Today's concern is:   Left hip DJD s/p YELENA on exam today patient is resting in bed, has refused to get out of bed yesterday and today, c/o Left hip pain 8/10 which is his usual level of reported pain, vitals are stable  In therapy patient  able to walking  in parallel bars up to 8 feet making very little progress in therapy, refused to get out of bed for therapy session yesterday  CAD/HTN/CKD: BP  104/62, 103/65, 89/56 with HR 80 range, denies CP, palpitations, SOB  Anemia Hgb 11.9 on 10/10/24  Constipation patient states bowels are working, LBM 10/27/24 per nursing notes  Urinary retention continues with willis catheter in place       Allergies, and PMH/PSH reviewed in [facility] today.  REVIEW OF SYSTEMS:  10 point ROS of systems including Constitutional, Eyes, Respiratory, Cardiovascular, Gastroenterology, Genitourinary, Integumentary, Musculoskeletal, Psychiatric were all negative except for pertinent positives noted in my HPI.    Objective:   /62   Pulse 83   Temp 98.1  F (36.7  C)   Resp 16   Ht 1.702 m (5' 7\")   Wt 77.6 kg (171 lb)   SpO2 91%   BMI 26.78 kg/m    GENERAL APPEARANCE:  Alert, in no distress  ENT:  Mouth and posterior oropharynx normal, moist mucous membranes, [place]  EYES:  EOM, conjunctivae, lids, pupils and irises normal, PERRL  RESP:  respiratory effort and palpation of chest normal, lungs clear to auscultation , no respiratory distress  CV:  regular rate and rhythm, no murmur, rub, or gallop, peripheral edema trace+ in LE bilaterally  ABDOMEN:  normal bowel sounds, soft, nontender, no hepatosplenomegaly or other masses  M/S:   patient resting in bed  SKIN:  Inspection of skin and subcutaneous tissue baseline, did not visualize surgical incision  NEURO:   speech " wnl  PSYCH:  affect and mood normal    Recent labs in [facility] reviewed by me today.  and Most Recent 3 CBC's:  Recent Labs   Lab Test 10/05/24  0719 10/04/24  0758 10/03/24  0543   WBC 8.6 10.5 8.2   HGB 13.4 13.6 11.2*    99 101*    153 122*     Most Recent 3 BMP's:  Recent Labs   Lab Test 10/05/24  1129 10/05/24  0808 10/05/24  0719 10/04/24  1113 10/04/24  0758 10/03/24  1105 10/03/24  0543   NA  --   --  141  --  137  --  137   POTASSIUM  --   --  4.6  --  5.1  --  5.0   CHLORIDE  --   --  103  --  101  --  106   CO2  --   --  23  --  22  --  24   BUN  --   --  31.9*  --  22.6  --  31.0*   CR  --   --  0.93  --  0.86  --  1.12   ANIONGAP  --   --  15  --  14  --  7   VIVIAN  --   --  9.7  --  9.6  --  9.1   * 135* 124*   < > 138*   < > 141*  141*    < > = values in this interval not displayed.       Assessment/Plan:  (M16.12) Primary osteoarthritis of left hip  (primary encounter diagnosis)  (R53.81) Physical deconditioning  Comment: acute/ongoing s/p left YELENA 10/1/24  No change  Plan: PT and OT, tylenol 500mg TID, dilaudid 2-4mg q 4 hours prn, hydroxyzine 10mg q 6 hours prn,   Finished Lovenox  ASA 81mg QD  F/u with ortho as directed     (I25.10) Coronary artery disease involving native coronary artery of native heart without angina pectoris  (I50.32) Chronic diastolic heart failure (H)  (I10) Benign essential hypertension  (N18.30) Stage 3 chronic kidney disease, unspecified whether stage 3a or 3b CKD (H)  Comment: acute/ongoing   Required ICU stay for hypotension/shock post op, elevated troponin, no ischemic findings per cardiology EF of 60%  Plan: BMP follow, decrease coreg to 6.25mg BID, hold lasix  Continue entresto 24-26mg BID, jardiance 10mg QD     (D62) Anemia due to blood loss, acute  Comment: acute/ongoing, no change  Hgb 11.9 on 10/10/24, hgb 12 on 10/28/24  Plan: Hgb follow     (K59.03) Drug-induced constipation  Comment: acute/ongoing, no change  Plan: increase senna s 2 PO BID,  continue miralax 17gm QD prn     (R33.9) urinary retention  Acute/ongoing, no change  Plan: willis cares, will attempt to DC willis once patient is walking > 25 feet with therapy, continue tasulosin 0.4mg QD       MED REC REQUIRED  Post Medication Reconciliation Status: medication reconcilation previously completed during another office visit      Orders:  Decrease coreg to 6.25mg BID  BMP and Hgb on Thursday  DC lasix     Total time with patient visit: 47 minutes including discussions about the POC and care coordination with the patient. Greater than 50% of total time spent with counseling and coordinating care due to reviewed nursing and therapy progress notes, medications and lab results, vitals and POC. Discussed POC and pain management with patient at bedside, discussed labs and medications with nursing at facility.   Electronically signed by: Tonya Lynn Haase, APRN CNP

## 2024-10-28 NOTE — TELEPHONE ENCOUNTER
[facility] Geriatrics Lab Note     Provider: Tonya Haase, APRN CNP  Facility: [facility] Facility Type:  TCU    Allergies   Allergen Reactions    Iodinated Contrast Media Shortness Of Breath    Atorvastatin Fatigue    Lisinopril Cough    Oxycodone Confusion    Sertraline Dizziness       Labs Reviewed by provider: BMP, Hgb     Verbal Order/Direction given by Provider: No new orders.      Provider giving Order:  Tonya Haase, APRN CNP    Verbal Order given to: Jamie Booth RN

## 2024-10-29 ENCOUNTER — TRANSITIONAL CARE UNIT VISIT (OUTPATIENT)
Dept: GERIATRICS | Facility: CLINIC | Age: 79
End: 2024-10-29
Payer: COMMERCIAL

## 2024-10-29 VITALS
BODY MASS INDEX: 26.84 KG/M2 | SYSTOLIC BLOOD PRESSURE: 104 MMHG | HEIGHT: 67 IN | DIASTOLIC BLOOD PRESSURE: 62 MMHG | HEART RATE: 83 BPM | OXYGEN SATURATION: 91 % | WEIGHT: 171 LBS | TEMPERATURE: 98.1 F | RESPIRATION RATE: 16 BRPM

## 2024-10-29 DIAGNOSIS — R33.9 URINARY RETENTION: ICD-10-CM

## 2024-10-29 DIAGNOSIS — M16.12 PRIMARY OSTEOARTHRITIS OF LEFT HIP: Primary | ICD-10-CM

## 2024-10-29 DIAGNOSIS — I50.32 CHRONIC DIASTOLIC HEART FAILURE (H): ICD-10-CM

## 2024-10-29 DIAGNOSIS — I25.10 CORONARY ARTERY DISEASE INVOLVING NATIVE CORONARY ARTERY OF NATIVE HEART WITHOUT ANGINA PECTORIS: ICD-10-CM

## 2024-10-29 DIAGNOSIS — R53.81 PHYSICAL DECONDITIONING: ICD-10-CM

## 2024-10-29 DIAGNOSIS — K59.03 DRUG-INDUCED CONSTIPATION: ICD-10-CM

## 2024-10-29 DIAGNOSIS — I10 BENIGN ESSENTIAL HYPERTENSION: ICD-10-CM

## 2024-10-29 DIAGNOSIS — Z96.642 HISTORY OF REVISION OF TOTAL REPLACEMENT OF LEFT HIP JOINT: ICD-10-CM

## 2024-10-29 DIAGNOSIS — N18.30 STAGE 3 CHRONIC KIDNEY DISEASE, UNSPECIFIED WHETHER STAGE 3A OR 3B CKD (H): ICD-10-CM

## 2024-10-29 DIAGNOSIS — D62 ANEMIA DUE TO BLOOD LOSS, ACUTE: ICD-10-CM

## 2024-10-29 PROCEDURE — 99310 SBSQ NF CARE HIGH MDM 45: CPT | Performed by: NURSE PRACTITIONER

## 2024-10-29 RX ORDER — CARVEDILOL 12.5 MG/1
6.25 TABLET ORAL 2 TIMES DAILY WITH MEALS
Status: SHIPPED
Start: 2024-10-29 | End: 2024-10-31

## 2024-10-29 NOTE — LETTER
" 10/29/2024      Chris Bhakta  [address] [place] [address]        [facility] GERIATRICS    Chief Complaint   Patient presents with     RECHECK     HPI:  Chris Bhakta is a 78 year old  (1945), who is being seen today for an episodic care visit at: [facility]) [25]. Today's concern is:   Left hip DJD s/p YELENA on exam today patient is resting in bed, has refused to get out of bed yesterday and today, c/o Left hip pain 8/10 which is his usual level of reported pain, vitals are stable  In therapy patient  able to walking  in parallel bars up to 8 feet making very little progress in therapy, refused to get out of bed for therapy session yesterday  CAD/HTN/CKD: BP  104/62, 103/65, 89/56 with HR 80 range, denies CP, palpitations, SOB  Anemia Hgb 11.9 on 10/10/24  Constipation patient states bowels are working, LBM 10/27/24 per nursing notes  Urinary retention continues with willis catheter in place       Allergies, and PMH/PSH reviewed in [facility] today.  REVIEW OF SYSTEMS:  10 point ROS of systems including Constitutional, Eyes, Respiratory, Cardiovascular, Gastroenterology, Genitourinary, Integumentary, Musculoskeletal, Psychiatric were all negative except for pertinent positives noted in my HPI.    Objective:   /62   Pulse 83   Temp 98.1  F (36.7  C)   Resp 16   Ht 1.702 m (5' 7\")   Wt 77.6 kg (171 lb)   SpO2 91%   BMI 26.78 kg/m    GENERAL APPEARANCE:  Alert, in no distress  ENT:  Mouth and posterior oropharynx normal, moist mucous membranes, [place]  EYES:  EOM, conjunctivae, lids, pupils and irises normal, PERRL  RESP:  respiratory effort and palpation of chest normal, lungs clear to auscultation , no respiratory distress  CV:  regular rate and rhythm, no murmur, rub, or gallop, peripheral edema trace+ in LE bilaterally  ABDOMEN:  normal bowel sounds, soft, nontender, no hepatosplenomegaly or other masses  M/S:   patient resting in bed  SKIN:  Inspection of skin and " subcutaneous tissue baseline, did not visualize surgical incision  NEURO:   speech wnl  PSYCH:  affect and mood normal    Recent labs in [facility] reviewed by me today.  and Most Recent 3 CBC's:  Recent Labs   Lab Test 10/05/24  0719 10/04/24  0758 10/03/24  0543   WBC 8.6 10.5 8.2   HGB 13.4 13.6 11.2*    99 101*    153 122*     Most Recent 3 BMP's:  Recent Labs   Lab Test 10/05/24  1129 10/05/24  0808 10/05/24  0719 10/04/24  1113 10/04/24  0758 10/03/24  1105 10/03/24  0543   NA  --   --  141  --  137  --  137   POTASSIUM  --   --  4.6  --  5.1  --  5.0   CHLORIDE  --   --  103  --  101  --  106   CO2  --   --  23  --  22  --  24   BUN  --   --  31.9*  --  22.6  --  31.0*   CR  --   --  0.93  --  0.86  --  1.12   ANIONGAP  --   --  15  --  14  --  7   VIVIAN  --   --  9.7  --  9.6  --  9.1   * 135* 124*   < > 138*   < > 141*  141*    < > = values in this interval not displayed.       Assessment/Plan:  (M16.12) Primary osteoarthritis of left hip  (primary encounter diagnosis)  (R53.81) Physical deconditioning  Comment: acute/ongoing s/p left YELENA 10/1/24  No change  Plan: PT and OT, tylenol 500mg TID, dilaudid 2-4mg q 4 hours prn, hydroxyzine 10mg q 6 hours prn,   Finished Lovenox  ASA 81mg QD  F/u with ortho as directed     (I25.10) Coronary artery disease involving native coronary artery of native heart without angina pectoris  (I50.32) Chronic diastolic heart failure (H)  (I10) Benign essential hypertension  (N18.30) Stage 3 chronic kidney disease, unspecified whether stage 3a or 3b CKD (H)  Comment: acute/ongoing   Required ICU stay for hypotension/shock post op, elevated troponin, no ischemic findings per cardiology EF of 60%  Plan: BMP follow, decrease coreg to 6.25mg BID, hold lasix  Continue entresto 24-26mg BID, jardiance 10mg QD     (D62) Anemia due to blood loss, acute  Comment: acute/ongoing, no change  Hgb 11.9 on 10/10/24, hgb 12 on 10/28/24  Plan: Hgb follow     (K59.03) Drug-induced  constipation  Comment: acute/ongoing, no change  Plan: increase senna s 2 PO BID, continue miralax 17gm QD prn     (R33.9) urinary retention  Acute/ongoing, no change  Plan: willis cares, will attempt to DC willis once patient is walking > 25 feet with therapy, continue tasulosin 0.4mg QD       MED REC REQUIRED  Post Medication Reconciliation Status: medication reconcilation previously completed during another office visit      Orders:  Decrease coreg to 6.25mg BID  BMP and Hgb on Thursday  DC lasix     Total time with patient visit: 47 minutes including discussions about the POC and care coordination with the patient. Greater than 50% of total time spent with counseling and coordinating care due to reviewed nursing and therapy progress notes, medications and lab results, vitals and POC. Discussed POC and pain management with patient at bedside, discussed labs and medications with nursing at facility.   Electronically signed by: Tonya Lynn Haase, APRN CNP         Sincerely,        Tonya Lynn Haase, APRN CNP

## 2024-10-31 ENCOUNTER — TELEPHONE (OUTPATIENT)
Dept: GERIATRICS | Facility: CLINIC | Age: 79
End: 2024-10-31

## 2024-10-31 ENCOUNTER — TRANSITIONAL CARE UNIT VISIT (OUTPATIENT)
Dept: GERIATRICS | Facility: CLINIC | Age: 79
End: 2024-10-31
Payer: COMMERCIAL

## 2024-10-31 VITALS
OXYGEN SATURATION: 95 % | DIASTOLIC BLOOD PRESSURE: 62 MMHG | BODY MASS INDEX: 26.84 KG/M2 | HEART RATE: 85 BPM | TEMPERATURE: 97.3 F | WEIGHT: 171 LBS | RESPIRATION RATE: 16 BRPM | SYSTOLIC BLOOD PRESSURE: 90 MMHG | HEIGHT: 67 IN

## 2024-10-31 DIAGNOSIS — I10 BENIGN ESSENTIAL HYPERTENSION: ICD-10-CM

## 2024-10-31 DIAGNOSIS — I50.32 CHRONIC DIASTOLIC HEART FAILURE (H): ICD-10-CM

## 2024-10-31 DIAGNOSIS — D62 ANEMIA DUE TO BLOOD LOSS, ACUTE: ICD-10-CM

## 2024-10-31 DIAGNOSIS — I25.10 CORONARY ARTERY DISEASE INVOLVING NATIVE CORONARY ARTERY OF NATIVE HEART WITHOUT ANGINA PECTORIS: ICD-10-CM

## 2024-10-31 DIAGNOSIS — M16.12 PRIMARY OSTEOARTHRITIS OF LEFT HIP: Primary | ICD-10-CM

## 2024-10-31 DIAGNOSIS — R53.81 PHYSICAL DECONDITIONING: ICD-10-CM

## 2024-10-31 DIAGNOSIS — Z96.642 HISTORY OF REVISION OF TOTAL REPLACEMENT OF LEFT HIP JOINT: ICD-10-CM

## 2024-10-31 DIAGNOSIS — K59.03 DRUG-INDUCED CONSTIPATION: ICD-10-CM

## 2024-10-31 DIAGNOSIS — R33.9 URINARY RETENTION: ICD-10-CM

## 2024-10-31 DIAGNOSIS — N18.30 STAGE 3 CHRONIC KIDNEY DISEASE, UNSPECIFIED WHETHER STAGE 3A OR 3B CKD (H): ICD-10-CM

## 2024-10-31 PROCEDURE — 99310 SBSQ NF CARE HIGH MDM 45: CPT | Performed by: NURSE PRACTITIONER

## 2024-10-31 RX ORDER — CARVEDILOL 3.12 MG/1
3.12 TABLET ORAL 2 TIMES DAILY WITH MEALS
Status: SHIPPED
Start: 2024-10-31

## 2024-10-31 NOTE — TELEPHONE ENCOUNTER
CC and HPI:  Nurse reports that BUN 44, creat 1.37, Na 135, K 4.4 pt is eating and drinking.    VS: stable    ASSESSMENT and PLAN:    Push fluids,   Update the PCP NP  Plz order another BMP for 11/4    Call if further concerns as well as update patient's  provider/NP.

## 2024-10-31 NOTE — PROGRESS NOTES
"[facility] GERIATRICS    Chief Complaint   Patient presents with    RECHECK     HPI:  Chris Bhakta is a 78 year old  (1945), who is being seen today for an episodic care visit at: [facility]) [25]. Today's concern is:   Left hip DJD s/p YELENA on exam today patient is sitting up on edge of bed, states he has pain \"all over\" nursing and therapy state patient wasn't taking pain medications the past few days d/t constipation, he was refusing therapy. Today patient is alert and participating in therapy.   In therapy patient is only able parallel bars up to 8 feet making very little progress in therapy,  CAD/HTN/CKD: BP  95/60, 91/57, 123/74 with HR 70-80 range, denies CP, palpitations, SOB \  Admission weight was 175lbs and current weight is 166lbs  Cr 1.24 on 10/28/24  Anemia Hgb 12 on 10/28/24  Constipation patient states bowels are working, LBM 10/30/24 per nursing notes  Urinary retention continues with willis catheter in place       Allergies, and PMH/PSH reviewed in [facility] today.  REVIEW OF SYSTEMS:  10 point ROS of systems including Constitutional, Eyes, Respiratory, Cardiovascular, Gastroenterology, Genitourinary, Integumentary, Musculoskeletal, Psychiatric were all negative except for pertinent positives noted in my HPI.    Objective:   BP 90/62   Pulse 85   Temp 97.3  F (36.3  C)   Resp 16   Ht 1.702 m (5' 7\")   Wt 77.6 kg (171 lb)   SpO2 95%   BMI 26.78 kg/m    GENERAL APPEARANCE:  Alert, in no distress  ENT:  Mouth and posterior oropharynx normal, moist mucous membranes, [place]  EYES:  EOM, conjunctivae, lids, pupils and irises normal, PERRL  RESP:  respiratory effort and palpation of chest normal, lungs clear to auscultation , no respiratory distress  CV:  regular rate and rhythm, no murmur, rub, or gallop, no edema  ABDOMEN:  normal bowel sounds, soft, nontender, no hepatosplenomegaly or other masses  M/S:   patient sitting up on edge of bed  SKIN:  Inspection of skin and " subcutaneous tissue baseline, did not visualize surgical incision  NEURO:   speech wnl  PSYCH:  affect and mood normal    Recent labs in [facility] reviewed by me today.  and Most Recent 3 CBC's:  Recent Labs   Lab Test 10/05/24  0719 10/04/24  0758 10/03/24  0543   WBC 8.6 10.5 8.2   HGB 13.4 13.6 11.2*    99 101*    153 122*     Most Recent 3 BMP's:  Recent Labs   Lab Test 10/05/24  1129 10/05/24  0808 10/05/24  0719 10/04/24  1113 10/04/24  0758 10/03/24  1105 10/03/24  0543   NA  --   --  141  --  137  --  137   POTASSIUM  --   --  4.6  --  5.1  --  5.0   CHLORIDE  --   --  103  --  101  --  106   CO2  --   --  23  --  22  --  24   BUN  --   --  31.9*  --  22.6  --  31.0*   CR  --   --  0.93  --  0.86  --  1.12   ANIONGAP  --   --  15  --  14  --  7   VIVIAN  --   --  9.7  --  9.6  --  9.1   * 135* 124*   < > 138*   < > 141*  141*    < > = values in this interval not displayed.       Assessment/Plan:  (M16.12) Primary osteoarthritis of left hip  (primary encounter diagnosis)  (R53.81) Physical deconditioning  Comment: acute/ongoing s/p left YELENA 10/1/24  No change  Plan: PT and OT, tylenol 500mg TID, dilaudid 2-4mg q 4 hours prn, hydroxyzine 10mg q 6 hours prn,   Finished Lovenox  ASA 81mg QD  F/u with ortho as directed     (I25.10) Coronary artery disease involving native coronary artery of native heart without angina pectoris  (I50.32) Chronic diastolic heart failure (H)  (I10) Benign essential hypertension  (N18.30) Stage 3 chronic kidney disease, unspecified whether stage 3a or 3b CKD (H)  Comment: acute/ongoing, no change   Required ICU stay for hypotension/shock post op, elevated troponin, no ischemic findings per cardiology EF of 60%  Plan: BMP follow,  No further lasix  Decrease coreg to 3.125mg BID  Continue entresto 24-26mg BID, jardiance 10mg QD     (D62) Anemia due to blood loss, acute  Comment: acute/ongoing, no change  Hgb 11.9 on 10/10/24, hgb 12 on 10/28/24  Plan: Hgb follow      (K59.03) Drug-induced constipation  Comment: acute/ongoing, no change  Plan: increase senna s 2 PO BID, continue miralax 17gm QD prn     (R33.9) urinary retention  Acute/ongoing, no change  Plan: willis cares, will attempt to DC willis once patient is walking > 25 feet with therapy, continue tasulosin 0.4mg QD       MED REC REQUIRED  Post Medication Reconciliation Status: medication reconcilation previously completed during another office visit      Orders:  Decrease coreg to 3.125mg BID hold if SBP < 95    Total time with patient visit: 47 minutes including discussions about the POC and care coordination with the patient. Greater than 50% of total time spent with counseling and coordinating care due to reviewed nursing and therapy progress notes, medications and lab results, vitals signs and POC. Discussed medications and POC with patient at bedside. Discussed medications, and POC with IDT at facility.   Electronically signed by: Tonya Lynn Haase, APRN CNP

## 2024-10-31 NOTE — LETTER
" 10/31/2024      Chris Bhakta  [address] [place] [address]        [facility] GERIATRICS    Chief Complaint   Patient presents with     RECHECK     HPI:  Chris Bhakta is a 78 year old  (1945), who is being seen today for an episodic care visit at: [facility]) [25]. Today's concern is:   Left hip DJD s/p YELENA on exam today patient is sitting up on edge of bed, states he has pain \"all over\" nursing and therapy state patient wasn't taking pain medications the past few days d/t constipation, he was refusing therapy. Today patient is alert and participating in therapy.   In therapy patient is only able parallel bars up to 8 feet making very little progress in therapy,  CAD/HTN/CKD: BP  95/60, 91/57, 123/74 with HR 70-80 range, denies CP, palpitations, SOB \  Admission weight was 175lbs and current weight is 166lbs  Cr 1.24 on 10/28/24  Anemia Hgb 12 on 10/28/24  Constipation patient states bowels are working, LBM 10/30/24 per nursing notes  Urinary retention continues with willis catheter in place       Allergies, and PMH/PSH reviewed in [facility] today.  REVIEW OF SYSTEMS:  10 point ROS of systems including Constitutional, Eyes, Respiratory, Cardiovascular, Gastroenterology, Genitourinary, Integumentary, Musculoskeletal, Psychiatric were all negative except for pertinent positives noted in my HPI.    Objective:   BP 90/62   Pulse 85   Temp 97.3  F (36.3  C)   Resp 16   Ht 1.702 m (5' 7\")   Wt 77.6 kg (171 lb)   SpO2 95%   BMI 26.78 kg/m    GENERAL APPEARANCE:  Alert, in no distress  ENT:  Mouth and posterior oropharynx normal, moist mucous membranes, [place]  EYES:  EOM, conjunctivae, lids, pupils and irises normal, PERRL  RESP:  respiratory effort and palpation of chest normal, lungs clear to auscultation , no respiratory distress  CV:  regular rate and rhythm, no murmur, rub, or gallop, no edema  ABDOMEN:  normal bowel sounds, soft, nontender, no hepatosplenomegaly or other " masses  M/S:   patient sitting up on edge of bed  SKIN:  Inspection of skin and subcutaneous tissue baseline, did not visualize surgical incision  NEURO:   speech wnl  PSYCH:  affect and mood normal    Recent labs in [facility] reviewed by me today.  and Most Recent 3 CBC's:  Recent Labs   Lab Test 10/05/24  0719 10/04/24  0758 10/03/24  0543   WBC 8.6 10.5 8.2   HGB 13.4 13.6 11.2*    99 101*    153 122*     Most Recent 3 BMP's:  Recent Labs   Lab Test 10/05/24  1129 10/05/24  0808 10/05/24  0719 10/04/24  1113 10/04/24  0758 10/03/24  1105 10/03/24  0543   NA  --   --  141  --  137  --  137   POTASSIUM  --   --  4.6  --  5.1  --  5.0   CHLORIDE  --   --  103  --  101  --  106   CO2  --   --  23  --  22  --  24   BUN  --   --  31.9*  --  22.6  --  31.0*   CR  --   --  0.93  --  0.86  --  1.12   ANIONGAP  --   --  15  --  14  --  7   VIVIAN  --   --  9.7  --  9.6  --  9.1   * 135* 124*   < > 138*   < > 141*  141*    < > = values in this interval not displayed.       Assessment/Plan:  (M16.12) Primary osteoarthritis of left hip  (primary encounter diagnosis)  (R53.81) Physical deconditioning  Comment: acute/ongoing s/p left YELENA 10/1/24  No change  Plan: PT and OT, tylenol 500mg TID, dilaudid 2-4mg q 4 hours prn, hydroxyzine 10mg q 6 hours prn,   Finished Lovenox  ASA 81mg QD  F/u with ortho as directed     (I25.10) Coronary artery disease involving native coronary artery of native heart without angina pectoris  (I50.32) Chronic diastolic heart failure (H)  (I10) Benign essential hypertension  (N18.30) Stage 3 chronic kidney disease, unspecified whether stage 3a or 3b CKD (H)  Comment: acute/ongoing, no change   Required ICU stay for hypotension/shock post op, elevated troponin, no ischemic findings per cardiology EF of 60%  Plan: BMP follow,  No further lasix  Decrease coreg to 3.125mg BID  Continue entresto 24-26mg BID, jardiance 10mg QD     (D62) Anemia due to blood loss, acute  Comment: acute/ongoing,  no change  Hgb 11.9 on 10/10/24, hgb 12 on 10/28/24  Plan: Hgb follow     (K59.03) Drug-induced constipation  Comment: acute/ongoing, no change  Plan: increase senna s 2 PO BID, continue miralax 17gm QD prn     (R33.9) urinary retention  Acute/ongoing, no change  Plan: willis cares, will attempt to DC willis once patient is walking > 25 feet with therapy, continue tasulosin 0.4mg QD       MED REC REQUIRED  Post Medication Reconciliation Status: medication reconcilation previously completed during another office visit      Orders:  Decrease coreg to 3.125mg BID hold if SBP < 95    Total time with patient visit: 47 minutes including discussions about the POC and care coordination with the patient. Greater than 50% of total time spent with counseling and coordinating care due to reviewed nursing and therapy progress notes, medications and lab results, vitals signs and POC. Discussed medications and POC with patient at bedside. Discussed medications, and POC with IDT at facility.   Electronically signed by: Tonya Lynn Haase, APRN CNP         Sincerely,        Tonya Lynn Haase, APRN CNP

## 2024-11-01 ENCOUNTER — TELEPHONE (OUTPATIENT)
Dept: GERIATRICS | Facility: CLINIC | Age: 79
End: 2024-11-01

## 2024-11-01 ENCOUNTER — APPOINTMENT (OUTPATIENT)
Dept: MRI IMAGING | Facility: CLINIC | Age: 79
DRG: 698 | End: 2024-11-01
Attending: STUDENT IN AN ORGANIZED HEALTH CARE EDUCATION/TRAINING PROGRAM
Payer: COMMERCIAL

## 2024-11-01 ENCOUNTER — HOSPITAL ENCOUNTER (INPATIENT)
Facility: CLINIC | Age: 79
LOS: 3 days | Discharge: NURSING, REHAB OR LONG-TERM CARE | DRG: 698 | End: 2024-11-04
Attending: STUDENT IN AN ORGANIZED HEALTH CARE EDUCATION/TRAINING PROGRAM | Admitting: INTERNAL MEDICINE
Payer: COMMERCIAL

## 2024-11-01 ENCOUNTER — APPOINTMENT (OUTPATIENT)
Dept: GENERAL RADIOLOGY | Facility: CLINIC | Age: 79
DRG: 698 | End: 2024-11-01
Attending: EMERGENCY MEDICINE
Payer: COMMERCIAL

## 2024-11-01 ENCOUNTER — APPOINTMENT (OUTPATIENT)
Dept: CT IMAGING | Facility: CLINIC | Age: 79
DRG: 698 | End: 2024-11-01
Attending: EMERGENCY MEDICINE
Payer: COMMERCIAL

## 2024-11-01 DIAGNOSIS — N17.9 AKI (ACUTE KIDNEY INJURY) (H): ICD-10-CM

## 2024-11-01 DIAGNOSIS — R31.9 HEMATURIA: ICD-10-CM

## 2024-11-01 DIAGNOSIS — N39.0 UTI (URINARY TRACT INFECTION): ICD-10-CM

## 2024-11-01 DIAGNOSIS — I63.9 ACUTE ISCHEMIC STROKE (H): Primary | ICD-10-CM

## 2024-11-01 DIAGNOSIS — R47.81 SLURRED SPEECH: ICD-10-CM

## 2024-11-01 DIAGNOSIS — Z96.642 HISTORY OF REVISION OF TOTAL REPLACEMENT OF LEFT HIP JOINT: ICD-10-CM

## 2024-11-01 DIAGNOSIS — I95.9 HYPOTENSION, UNSPECIFIED HYPOTENSION TYPE: ICD-10-CM

## 2024-11-01 LAB
ABO/RH(D): NORMAL
ALBUMIN UR-MCNC: 50 MG/DL
ANION GAP SERPL CALCULATED.3IONS-SCNC: 18 MMOL/L (ref 7–15)
ANTIBODY SCREEN: NEGATIVE
APPEARANCE UR: ABNORMAL
APTT PPP: 34 SECONDS (ref 22–38)
BASOPHILS # BLD AUTO: 0 10E3/UL (ref 0–0.2)
BASOPHILS NFR BLD AUTO: 0 %
BILIRUB UR QL STRIP: NEGATIVE
BUN SERPL-MCNC: 47.6 MG/DL (ref 8–23)
CALCIUM SERPL-MCNC: 9.7 MG/DL (ref 8.8–10.4)
CHLORIDE SERPL-SCNC: 97 MMOL/L (ref 98–107)
COLOR UR AUTO: ABNORMAL
CREAT SERPL-MCNC: 1.36 MG/DL (ref 0.67–1.17)
EGFRCR SERPLBLD CKD-EPI 2021: 53 ML/MIN/1.73M2
EOSINOPHIL # BLD AUTO: 0 10E3/UL (ref 0–0.7)
EOSINOPHIL NFR BLD AUTO: 0 %
ERYTHROCYTE [DISTWIDTH] IN BLOOD BY AUTOMATED COUNT: 13.1 % (ref 10–15)
GLUCOSE SERPL-MCNC: 261 MG/DL (ref 70–99)
GLUCOSE UR STRIP-MCNC: >=1000 MG/DL
HCO3 SERPL-SCNC: 17 MMOL/L (ref 22–29)
HCT VFR BLD AUTO: 35.6 % (ref 40–53)
HGB BLD-MCNC: 11.9 G/DL (ref 13.3–17.7)
HGB UR QL STRIP: ABNORMAL
IMM GRANULOCYTES # BLD: 0.1 10E3/UL
IMM GRANULOCYTES NFR BLD: 1 %
INR PPP: 1.13 (ref 0.85–1.15)
KETONES UR STRIP-MCNC: NEGATIVE MG/DL
LACTATE SERPL-SCNC: 2 MMOL/L (ref 0.7–2)
LEUKOCYTE ESTERASE UR QL STRIP: ABNORMAL
LYMPHOCYTES # BLD AUTO: 0.9 10E3/UL (ref 0.8–5.3)
LYMPHOCYTES NFR BLD AUTO: 11 %
MAGNESIUM SERPL-MCNC: 2.5 MG/DL (ref 1.7–2.3)
MCH RBC QN AUTO: 33.1 PG (ref 26.5–33)
MCHC RBC AUTO-ENTMCNC: 33.4 G/DL (ref 31.5–36.5)
MCV RBC AUTO: 99 FL (ref 78–100)
MONOCYTES # BLD AUTO: 0.5 10E3/UL (ref 0–1.3)
MONOCYTES NFR BLD AUTO: 6 %
MUCOUS THREADS #/AREA URNS LPF: PRESENT /LPF
NEUTROPHILS # BLD AUTO: 7 10E3/UL (ref 1.6–8.3)
NEUTROPHILS NFR BLD AUTO: 83 %
NITRATE UR QL: NEGATIVE
NRBC # BLD AUTO: 0 10E3/UL
NRBC BLD AUTO-RTO: 0 /100
PH UR STRIP: 8 [PH] (ref 5–7)
PLATELET # BLD AUTO: 180 10E3/UL (ref 150–450)
POTASSIUM SERPL-SCNC: 3.9 MMOL/L (ref 3.4–5.3)
RBC # BLD AUTO: 3.59 10E6/UL (ref 4.4–5.9)
RBC URINE: >182 /HPF
SODIUM SERPL-SCNC: 132 MMOL/L (ref 135–145)
SP GR UR STRIP: 1.02 (ref 1–1.03)
SPECIMEN EXPIRATION DATE: NORMAL
UROBILINOGEN UR STRIP-MCNC: NORMAL MG/DL
WBC # BLD AUTO: 8.5 10E3/UL (ref 4–11)
WBC CLUMPS #/AREA URNS HPF: PRESENT /HPF
WBC URINE: >182 /HPF

## 2024-11-01 PROCEDURE — 258N000003 HC RX IP 258 OP 636: Performed by: STUDENT IN AN ORGANIZED HEALTH CARE EDUCATION/TRAINING PROGRAM

## 2024-11-01 PROCEDURE — 99223 1ST HOSP IP/OBS HIGH 75: CPT | Performed by: INTERNAL MEDICINE

## 2024-11-01 PROCEDURE — 70544 MR ANGIOGRAPHY HEAD W/O DYE: CPT

## 2024-11-01 PROCEDURE — 86901 BLOOD TYPING SEROLOGIC RH(D): CPT | Performed by: EMERGENCY MEDICINE

## 2024-11-01 PROCEDURE — 86900 BLOOD TYPING SEROLOGIC ABO: CPT | Performed by: EMERGENCY MEDICINE

## 2024-11-01 PROCEDURE — 83605 ASSAY OF LACTIC ACID: CPT | Performed by: STUDENT IN AN ORGANIZED HEALTH CARE EDUCATION/TRAINING PROGRAM

## 2024-11-01 PROCEDURE — 87040 BLOOD CULTURE FOR BACTERIA: CPT | Performed by: STUDENT IN AN ORGANIZED HEALTH CARE EDUCATION/TRAINING PROGRAM

## 2024-11-01 PROCEDURE — 83735 ASSAY OF MAGNESIUM: CPT | Performed by: EMERGENCY MEDICINE

## 2024-11-01 PROCEDURE — 70450 CT HEAD/BRAIN W/O DYE: CPT

## 2024-11-01 PROCEDURE — 80048 BASIC METABOLIC PNL TOTAL CA: CPT | Performed by: STUDENT IN AN ORGANIZED HEALTH CARE EDUCATION/TRAINING PROGRAM

## 2024-11-01 PROCEDURE — 82465 ASSAY BLD/SERUM CHOLESTEROL: CPT | Performed by: INTERNAL MEDICINE

## 2024-11-01 PROCEDURE — 84484 ASSAY OF TROPONIN QUANT: CPT | Performed by: INTERNAL MEDICINE

## 2024-11-01 PROCEDURE — 250N000011 HC RX IP 250 OP 636: Performed by: STUDENT IN AN ORGANIZED HEALTH CARE EDUCATION/TRAINING PROGRAM

## 2024-11-01 PROCEDURE — 70549 MR ANGIOGRAPH NECK W/O&W/DYE: CPT

## 2024-11-01 PROCEDURE — 85610 PROTHROMBIN TIME: CPT | Performed by: EMERGENCY MEDICINE

## 2024-11-01 PROCEDURE — 96360 HYDRATION IV INFUSION INIT: CPT | Mod: 59

## 2024-11-01 PROCEDURE — 120N000001 HC R&B MED SURG/OB

## 2024-11-01 PROCEDURE — 36415 COLL VENOUS BLD VENIPUNCTURE: CPT | Performed by: STUDENT IN AN ORGANIZED HEALTH CARE EDUCATION/TRAINING PROGRAM

## 2024-11-01 PROCEDURE — 85730 THROMBOPLASTIN TIME PARTIAL: CPT | Performed by: EMERGENCY MEDICINE

## 2024-11-01 PROCEDURE — 71046 X-RAY EXAM CHEST 2 VIEWS: CPT

## 2024-11-01 PROCEDURE — 36415 COLL VENOUS BLD VENIPUNCTURE: CPT | Performed by: EMERGENCY MEDICINE

## 2024-11-01 PROCEDURE — 70553 MRI BRAIN STEM W/O & W/DYE: CPT

## 2024-11-01 PROCEDURE — 87186 SC STD MICRODIL/AGAR DIL: CPT | Performed by: STUDENT IN AN ORGANIZED HEALTH CARE EDUCATION/TRAINING PROGRAM

## 2024-11-01 PROCEDURE — 99291 CRITICAL CARE FIRST HOUR: CPT | Mod: 25

## 2024-11-01 PROCEDURE — 81001 URINALYSIS AUTO W/SCOPE: CPT | Performed by: STUDENT IN AN ORGANIZED HEALTH CARE EDUCATION/TRAINING PROGRAM

## 2024-11-01 PROCEDURE — 85025 COMPLETE CBC W/AUTO DIFF WBC: CPT | Performed by: STUDENT IN AN ORGANIZED HEALTH CARE EDUCATION/TRAINING PROGRAM

## 2024-11-01 RX ORDER — CEFTRIAXONE 1 G/1
1 INJECTION, POWDER, FOR SOLUTION INTRAMUSCULAR; INTRAVENOUS ONCE
Status: COMPLETED | OUTPATIENT
Start: 2024-11-01 | End: 2024-11-01

## 2024-11-01 RX ADMIN — SODIUM CHLORIDE 1000 ML: 9 INJECTION, SOLUTION INTRAVENOUS at 21:26

## 2024-11-01 RX ADMIN — CEFTRIAXONE SODIUM 1 G: 1 INJECTION, POWDER, FOR SOLUTION INTRAMUSCULAR; INTRAVENOUS at 22:42

## 2024-11-01 ASSESSMENT — ACTIVITIES OF DAILY LIVING (ADL)
ADLS_ACUITY_SCORE: 0

## 2024-11-01 NOTE — PROGRESS NOTES
Nurse is calling to report blood pressure 77/44, has been running low today, per chart review has been 90s-110. Coreg recently reduced. Tired but responsive today. Was hypotensive shock in the hospital and required icu.   Orders: push fluids x 30 minutes, if systolic continues to be <85, send to ED for hypotension.     Desiree Bravo, CNP

## 2024-11-02 ENCOUNTER — APPOINTMENT (OUTPATIENT)
Dept: OCCUPATIONAL THERAPY | Facility: CLINIC | Age: 79
DRG: 698 | End: 2024-11-02
Attending: INTERNAL MEDICINE
Payer: COMMERCIAL

## 2024-11-02 ENCOUNTER — APPOINTMENT (OUTPATIENT)
Dept: SPEECH THERAPY | Facility: CLINIC | Age: 79
DRG: 698 | End: 2024-11-02
Attending: INTERNAL MEDICINE
Payer: COMMERCIAL

## 2024-11-02 LAB
ANION GAP SERPL CALCULATED.3IONS-SCNC: 15 MMOL/L (ref 7–15)
BUN SERPL-MCNC: 40.5 MG/DL (ref 8–23)
CALCIUM SERPL-MCNC: 9.4 MG/DL (ref 8.8–10.4)
CHLORIDE SERPL-SCNC: 102 MMOL/L (ref 98–107)
CHOLEST SERPL-MCNC: 118 MG/DL
CREAT SERPL-MCNC: 1.33 MG/DL (ref 0.67–1.17)
EGFRCR SERPLBLD CKD-EPI 2021: 54 ML/MIN/1.73M2
GLUCOSE BLDC GLUCOMTR-MCNC: 191 MG/DL (ref 70–99)
GLUCOSE BLDC GLUCOMTR-MCNC: 196 MG/DL (ref 70–99)
GLUCOSE BLDC GLUCOMTR-MCNC: 204 MG/DL (ref 70–99)
GLUCOSE SERPL-MCNC: 183 MG/DL (ref 70–99)
HCO3 SERPL-SCNC: 19 MMOL/L (ref 22–29)
HDLC SERPL-MCNC: 19 MG/DL
LDLC SERPL CALC-MCNC: 51 MG/DL
NONHDLC SERPL-MCNC: 99 MG/DL
POTASSIUM SERPL-SCNC: 4 MMOL/L (ref 3.4–5.3)
SODIUM SERPL-SCNC: 136 MMOL/L (ref 135–145)
TRIGL SERPL-MCNC: 241 MG/DL
TROPONIN T SERPL HS-MCNC: 50 NG/L

## 2024-11-02 PROCEDURE — 97167 OT EVAL HIGH COMPLEX 60 MIN: CPT | Mod: GO

## 2024-11-02 PROCEDURE — 80048 BASIC METABOLIC PNL TOTAL CA: CPT | Performed by: INTERNAL MEDICINE

## 2024-11-02 PROCEDURE — 92610 EVALUATE SWALLOWING FUNCTION: CPT | Mod: GN | Performed by: REHABILITATION PRACTITIONER

## 2024-11-02 PROCEDURE — 258N000003 HC RX IP 258 OP 636: Performed by: INTERNAL MEDICINE

## 2024-11-02 PROCEDURE — 250N000011 HC RX IP 250 OP 636: Performed by: INTERNAL MEDICINE

## 2024-11-02 PROCEDURE — 36415 COLL VENOUS BLD VENIPUNCTURE: CPT | Performed by: INTERNAL MEDICINE

## 2024-11-02 PROCEDURE — 92526 ORAL FUNCTION THERAPY: CPT | Mod: GN | Performed by: REHABILITATION PRACTITIONER

## 2024-11-02 PROCEDURE — 250N000009 HC RX 250: Performed by: INTERNAL MEDICINE

## 2024-11-02 PROCEDURE — 99221 1ST HOSP IP/OBS SF/LOW 40: CPT | Mod: GC | Performed by: STUDENT IN AN ORGANIZED HEALTH CARE EDUCATION/TRAINING PROGRAM

## 2024-11-02 PROCEDURE — 99233 SBSQ HOSP IP/OBS HIGH 50: CPT | Performed by: STUDENT IN AN ORGANIZED HEALTH CARE EDUCATION/TRAINING PROGRAM

## 2024-11-02 PROCEDURE — 97530 THERAPEUTIC ACTIVITIES: CPT | Mod: GO

## 2024-11-02 PROCEDURE — 120N000001 HC R&B MED SURG/OB

## 2024-11-02 PROCEDURE — 255N000002 HC RX 255 OP 636: Performed by: STUDENT IN AN ORGANIZED HEALTH CARE EDUCATION/TRAINING PROGRAM

## 2024-11-02 PROCEDURE — A9585 GADOBUTROL INJECTION: HCPCS | Performed by: STUDENT IN AN ORGANIZED HEALTH CARE EDUCATION/TRAINING PROGRAM

## 2024-11-02 PROCEDURE — 250N000013 HC RX MED GY IP 250 OP 250 PS 637: Performed by: INTERNAL MEDICINE

## 2024-11-02 RX ORDER — LIDOCAINE HYDROCHLORIDE 20 MG/ML
JELLY TOPICAL ONCE
Status: DISCONTINUED | OUTPATIENT
Start: 2024-11-02 | End: 2024-11-04 | Stop reason: HOSPADM

## 2024-11-02 RX ORDER — DULOXETIN HYDROCHLORIDE 30 MG/1
30 CAPSULE, DELAYED RELEASE ORAL 2 TIMES DAILY
Status: DISCONTINUED | OUTPATIENT
Start: 2024-11-02 | End: 2024-11-02

## 2024-11-02 RX ORDER — LIDOCAINE 40 MG/G
CREAM TOPICAL
Status: DISCONTINUED | OUTPATIENT
Start: 2024-11-02 | End: 2024-11-04 | Stop reason: HOSPADM

## 2024-11-02 RX ORDER — HYDROXYZINE HYDROCHLORIDE 10 MG/1
10 TABLET, FILM COATED ORAL EVERY 6 HOURS PRN
Status: DISCONTINUED | OUTPATIENT
Start: 2024-11-02 | End: 2024-11-04 | Stop reason: HOSPADM

## 2024-11-02 RX ORDER — AMOXICILLIN 250 MG
2 CAPSULE ORAL 2 TIMES DAILY
Status: DISCONTINUED | OUTPATIENT
Start: 2024-11-02 | End: 2024-11-02

## 2024-11-02 RX ORDER — CARVEDILOL 3.12 MG/1
3.12 TABLET ORAL 2 TIMES DAILY WITH MEALS
Status: DISCONTINUED | OUTPATIENT
Start: 2024-11-02 | End: 2024-11-04 | Stop reason: HOSPADM

## 2024-11-02 RX ORDER — TAMSULOSIN HYDROCHLORIDE 0.4 MG/1
0.4 CAPSULE ORAL EVERY MORNING
Status: DISCONTINUED | OUTPATIENT
Start: 2024-11-02 | End: 2024-11-04 | Stop reason: HOSPADM

## 2024-11-02 RX ORDER — NALOXONE HYDROCHLORIDE 0.4 MG/ML
0.2 INJECTION, SOLUTION INTRAMUSCULAR; INTRAVENOUS; SUBCUTANEOUS
Status: DISCONTINUED | OUTPATIENT
Start: 2024-11-02 | End: 2024-11-04 | Stop reason: HOSPADM

## 2024-11-02 RX ORDER — ASPIRIN 81 MG/1
81 TABLET, CHEWABLE ORAL DAILY
Status: DISCONTINUED | OUTPATIENT
Start: 2024-11-02 | End: 2024-11-04 | Stop reason: HOSPADM

## 2024-11-02 RX ORDER — ACETAMINOPHEN 500 MG
1000 TABLET ORAL
Status: DISCONTINUED | OUTPATIENT
Start: 2024-11-02 | End: 2024-11-04 | Stop reason: HOSPADM

## 2024-11-02 RX ORDER — CEFTRIAXONE 1 G/1
1 INJECTION, POWDER, FOR SOLUTION INTRAMUSCULAR; INTRAVENOUS EVERY 24 HOURS
Status: DISCONTINUED | OUTPATIENT
Start: 2024-11-02 | End: 2024-11-04 | Stop reason: HOSPADM

## 2024-11-02 RX ORDER — POLYETHYLENE GLYCOL 3350 17 G/17G
17 POWDER, FOR SOLUTION ORAL DAILY
Status: DISCONTINUED | OUTPATIENT
Start: 2024-11-02 | End: 2024-11-04 | Stop reason: HOSPADM

## 2024-11-02 RX ORDER — ASPIRIN 81 MG/1
81 TABLET, CHEWABLE ORAL DAILY
Status: DISCONTINUED | OUTPATIENT
Start: 2024-11-02 | End: 2024-11-02

## 2024-11-02 RX ORDER — CHOLECALCIFEROL (VITAMIN D3) 50 MCG
50 TABLET ORAL DAILY
Status: DISCONTINUED | OUTPATIENT
Start: 2024-11-02 | End: 2024-11-04 | Stop reason: HOSPADM

## 2024-11-02 RX ORDER — ACETAMINOPHEN 500 MG
1000 TABLET ORAL
Status: DISCONTINUED | OUTPATIENT
Start: 2024-11-02 | End: 2024-11-02

## 2024-11-02 RX ORDER — NALOXONE HYDROCHLORIDE 0.4 MG/ML
0.4 INJECTION, SOLUTION INTRAMUSCULAR; INTRAVENOUS; SUBCUTANEOUS
Status: DISCONTINUED | OUTPATIENT
Start: 2024-11-02 | End: 2024-11-04 | Stop reason: HOSPADM

## 2024-11-02 RX ORDER — DULOXETIN HYDROCHLORIDE 30 MG/1
30 CAPSULE, DELAYED RELEASE ORAL 2 TIMES DAILY
Status: DISCONTINUED | OUTPATIENT
Start: 2024-11-02 | End: 2024-11-04

## 2024-11-02 RX ORDER — LATANOPROST 50 UG/ML
1 SOLUTION/ DROPS OPHTHALMIC AT BEDTIME
Status: DISCONTINUED | OUTPATIENT
Start: 2024-11-02 | End: 2024-11-04 | Stop reason: HOSPADM

## 2024-11-02 RX ORDER — AMOXICILLIN 250 MG
2 CAPSULE ORAL 2 TIMES DAILY
Status: DISCONTINUED | OUTPATIENT
Start: 2024-11-02 | End: 2024-11-04 | Stop reason: HOSPADM

## 2024-11-02 RX ORDER — ASPIRIN 300 MG/1
81 SUPPOSITORY RECTAL DAILY
Status: DISCONTINUED | OUTPATIENT
Start: 2024-11-02 | End: 2024-11-04 | Stop reason: HOSPADM

## 2024-11-02 RX ORDER — ONDANSETRON 4 MG/1
4 TABLET, ORALLY DISINTEGRATING ORAL EVERY 6 HOURS PRN
Status: DISCONTINUED | OUTPATIENT
Start: 2024-11-02 | End: 2024-11-04 | Stop reason: HOSPADM

## 2024-11-02 RX ORDER — ASPIRIN 81 MG/1
81 TABLET ORAL DAILY
Status: DISCONTINUED | OUTPATIENT
Start: 2024-11-02 | End: 2024-11-04 | Stop reason: HOSPADM

## 2024-11-02 RX ORDER — ASPIRIN 300 MG/1
81 SUPPOSITORY RECTAL ONCE
Status: COMPLETED | OUTPATIENT
Start: 2024-11-02 | End: 2024-11-02

## 2024-11-02 RX ORDER — SODIUM CHLORIDE 9 MG/ML
INJECTION, SOLUTION INTRAVENOUS CONTINUOUS
Status: DISCONTINUED | OUTPATIENT
Start: 2024-11-02 | End: 2024-11-03

## 2024-11-02 RX ORDER — HYDROMORPHONE HYDROCHLORIDE 2 MG/1
2-4 TABLET ORAL EVERY 4 HOURS PRN
Status: DISCONTINUED | OUTPATIENT
Start: 2024-11-02 | End: 2024-11-04 | Stop reason: HOSPADM

## 2024-11-02 RX ORDER — GADOBUTROL 604.72 MG/ML
10 INJECTION INTRAVENOUS ONCE
Status: COMPLETED | OUTPATIENT
Start: 2024-11-02 | End: 2024-11-02

## 2024-11-02 RX ORDER — IOPAMIDOL 755 MG/ML
67 INJECTION, SOLUTION INTRAVASCULAR ONCE
Status: COMPLETED | OUTPATIENT
Start: 2024-11-02 | End: 2024-11-03

## 2024-11-02 RX ORDER — LIDOCAINE 40 MG/G
CREAM TOPICAL
OUTPATIENT
Start: 2024-11-02

## 2024-11-02 RX ORDER — ONDANSETRON 2 MG/ML
4 INJECTION INTRAMUSCULAR; INTRAVENOUS EVERY 6 HOURS PRN
Status: DISCONTINUED | OUTPATIENT
Start: 2024-11-02 | End: 2024-11-04 | Stop reason: HOSPADM

## 2024-11-02 RX ADMIN — GADOBUTROL 10 ML: 604.72 INJECTION INTRAVENOUS at 00:11

## 2024-11-02 RX ADMIN — Medication 50 MCG: at 12:45

## 2024-11-02 RX ADMIN — SODIUM CHLORIDE: 9 INJECTION, SOLUTION INTRAVENOUS at 04:17

## 2024-11-02 RX ADMIN — ACETAMINOPHEN 1000 MG: 500 TABLET, FILM COATED ORAL at 09:20

## 2024-11-02 RX ADMIN — SENNOSIDES AND DOCUSATE SODIUM 2 TABLET: 50; 8.6 TABLET ORAL at 09:19

## 2024-11-02 RX ADMIN — CEFTRIAXONE SODIUM 1 G: 1 INJECTION, POWDER, FOR SOLUTION INTRAMUSCULAR; INTRAVENOUS at 22:09

## 2024-11-02 RX ADMIN — ASPIRIN 81 MG CHEWABLE TABLET 81 MG: 81 TABLET CHEWABLE at 09:20

## 2024-11-02 RX ADMIN — ACETAMINOPHEN 1000 MG: 500 TABLET, FILM COATED ORAL at 12:45

## 2024-11-02 RX ADMIN — DULOXETINE HYDROCHLORIDE 30 MG: 30 CAPSULE, DELAYED RELEASE ORAL at 09:20

## 2024-11-02 RX ADMIN — SENNOSIDES AND DOCUSATE SODIUM 2 TABLET: 50; 8.6 TABLET ORAL at 20:19

## 2024-11-02 RX ADMIN — DULOXETINE HYDROCHLORIDE 30 MG: 30 CAPSULE, DELAYED RELEASE ORAL at 20:18

## 2024-11-02 RX ADMIN — ASPIRIN 75 MG: 300 SUPPOSITORY RECTAL at 05:01

## 2024-11-02 RX ADMIN — LATANOPROST 1 DROP: 50 SOLUTION/ DROPS OPHTHALMIC at 22:09

## 2024-11-02 RX ADMIN — TAMSULOSIN HYDROCHLORIDE 0.4 MG: 0.4 CAPSULE ORAL at 09:19

## 2024-11-02 RX ADMIN — HYDROXYZINE HYDROCHLORIDE 10 MG: 10 TABLET ORAL at 22:33

## 2024-11-02 RX ADMIN — POLYETHYLENE GLYCOL 3350 17 G: 17 POWDER, FOR SOLUTION ORAL at 09:32

## 2024-11-02 RX ADMIN — SODIUM CHLORIDE 90 ML: 9 INJECTION, SOLUTION INTRAVENOUS at 03:09

## 2024-11-02 NOTE — PROGRESS NOTES
DATE & TIME: 11/1/24-11/2/24 Night shift    Cognitive Concerns/ Orientation : A/O x4   BEHAVIOR & AGGRESSION TOOL COLOR: Green  CIWA SCORE: NA   ABNL VS/O2: VSS on RA  MOBILITY: Total care  PAIN MANAGMENT: C/O L hip and bilateral knee pain, declined intervention  DIET: Reg  BOWEL/BLADDER: Diaz in place (chronic)  ABNL LAB/BG: see results  DRAIN/DEVICES: Diaz, PIV  TELEMETRY RHYTHM:   SKIN: L hip incision, KIM, scattered scabs, small open area on sacrum, redness around perineum  TESTS/PROCEDURES: ECHO, CTA head Neck pending  D/C DAY/GOALS/PLACE: TBD  OTHER IMPORTANT INFO: neurology, SLP, PT. OT consult placed

## 2024-11-02 NOTE — PROGRESS NOTES
"   11/02/24 1117   Appointment Info   Signing Clinician's Name / Credentials (OT) Tiffany Blackman, OTD   Living Environment   Living Environment Comments Per pt report, he lived indep prior to TCU. Per chart review, pt from [facility]. Admit to hospital from TCU.   Self-Care   Usual Activity Tolerance poor   Current Activity Tolerance poor   Equipment Currently Used at Home wheelchair, manual   Activity/Exercise/Self-Care Comment Pt came from TCU. Per chart review, \"Stand pivot to wc/pivot squat at baseline. Reports independence with bathing and dressing. MR via care everywhere Home Care PT note from 1/2024 indicated the pt is not safe to ambulate without physical assist; therefore, recommended use of the wc for locomotion.\"   General Information   Onset of Illness/Injury or Date of Surgery 11/01/24   Referring Physician Anton Moreno MD   Additional Occupational Profile Info/Pertinent History of Current Problem 78-year-old male presents from the TCU with hematuria and hypertension and slurred speech. 10/1 YELENA and d/c to TCU from there. Found to have L occ/tem stroke.   Existing Precautions/Restrictions fall   Limitations/Impairments safety/cognitive   Cognitive Status Examination   Orientation Status place;person   Behavioral Issues withdrawn;uncooperative   Affect/Mental Status (Cognitive) confused;flat/blunted affect;low arousal/lethargic   Follows Commands follows one-step commands;50-74% accuracy   Memory Deficit moderate deficit;severe deficit   Attention Deficit moderate deficit   Cognitive Status Comments Pt with impaired cognition. Knows hes at the hospital but states it is from his \"broken hip\" Oriented to hip procedure on 10/1, d/c to TCU, now back with a stroke, pt unaware of reason for admit. Reports he was indep prior. Per chart review has been in w/c for past 1+ year.   Visual Perception   Impact of Vision Impairment on Function (Vision) NT, appeared to track appropriately.   Posture   Posture " protracted shoulders;forward head position;kyphosis   Range of Motion Comprehensive   General Range of Motion upper extremity range of motion deficits identified   Comment, General Range of Motion Slightly limited into shld flex   Strength Comprehensive (MMT)   General Manual Muscle Testing (MMT) Assessment upper extremity strength deficits identified;lower extremity strength deficits identified   Comment, General Manual Muscle Testing (MMT) Assessment B UE generalized weakness, R LE 4/5, L LE 3-4/5   Coordination   Coordination Comments Difficult to assess coordination, pt declines many assessments, will continue to assess as able.   Bed Mobility   Bed Mobility supine-sit   Supine-Sit [place] (Bed Mobility) moderate assist (50% patient effort)   Transfers   Transfers sit-stand transfer   Transfer Comments mod A x2   Activities of Daily Living   BADL Assessment/Intervention lower body dressing   Lower Body Dressing Assessment/Training   [place] Level (Lower Body Dressing) don;socks;dependent (less than 25% patient effort)   Clinical Impression   Criteria for Skilled Therapeutic Interventions Met (OT) Yes, treatment indicated   OT Diagnosis impaired funcitonal mobility and self-cares   OT Problem List-Impairments impacting ADL problems related to;activity tolerance impaired;balance;cognition;communication;coordination;mobility;range of motion (ROM);strength;pain;postural control   Assessment of Occupational Performance 5 or more Performance Deficits   Identified Performance Deficits dressing, toileting, home management, g/h, bathing   Planned Therapy Interventions (OT) ADL retraining;bed mobility training;cognition;fine motor coordination training;neuromuscular re-education;ROM;strengthening;transfer training;home program guidelines;progressive activity/exercise   Clinical Decision Making Complexity (OT) comprehensive assessment/high complexity   Risk & Benefits of therapy have been explained  evaluation/treatment results reviewed;care plan/treatment goals reviewed   OT Total Evaluation Time   OT Eval, High Complexity Minutes (78222) 10   OT Goals   Therapy Frequency (OT) 4 times/week   OT Predicted Duration/Target Date for Goal Attainment 11/16/24   OT Goals Hygiene/Grooming;Lower Body Dressing;Toilet Transfer/Toileting;Cognition   OT: Hygiene/Grooming supervision/stand-by assist   OT: Lower Body Dressing Moderate assist;using adaptive equipment;including set-up/clothing retrieval   OT: Toilet Transfer/Toileting Moderate assist;toilet transfer;cleaning and garment management   OT: Cognitive Patient/caregiver will verbalize understanding of cognitive assessment results/recommendations as needed for safe discharge planning   Interventions   Interventions Quick Adds Therapeutic Activity   Therapeutic Activities   Therapeutic Activity Minutes (39283) 15   Symptoms noted during/after treatment fatigue;increased pain   Treatment Detail/Skilled Intervention Pt refusing therapy initially, enouraged for OOB activity. Slurred speech evident and difficult to understand. Low arousal initially. Bed mob with mod A. Sitting balance min-mod A. Sit to stand with min A x2, good lift-off. Significant difficulty with advancing L LE, more difficulty with WB through L LE in order to move R foot. Stand pivot not successful with FWW. Max-total A x2 for pivot to chair. Sit to stand with min A x2 for repositioning. Poor activity tolerance.   OT Discharge Planning   OT Plan functional transfers Ax2, cognitive assessment, LB dressing if patient willing   OT Discharge Recommendation (DC Rec) Transitional Care Facility   OT Rationale for DC Rec Patient from TCU, recommend return back to TCU, Ax2 for transfers   OT Brief overview of current status Goals of therapy will be to address safe mobility and make recs for d/c to next level of care. Pt and RN will continue to follow all falls risk precautions as documented by RN staff while  hospitalized

## 2024-11-02 NOTE — ED PROVIDER NOTES
Emergency Department Note      History of Present Illness     Chief Complaint   Hematuria      MIKAYLA Bhakta is a 78 year old male with past medical history of CAD, CABG in 2009, heart failure, CKD, hypertension who presents with hematuria.  Patient is currently at TCU after left total hip arthroplasty, discharge from hospital on 10/3/2024.  At [facility], patient developed urinary retention and Diaz catheter was placed.  He has been doing well with this however today, hematuria developed.  Staff also noticed that his blood pressure was low.  Patient denies any abdominal pain, flank pain, nausea, vomiting.  Endorses chronic pain to bilateral knees and hips but nothing acute.    Daughter reports that patient has developed slurred speech for the past week. No confusion, word finding difficulty, or other neurologic deficits noted by family.    Independent Historian   Daughter    Review of External Notes   Reviewed discharge summary from 10/2/24 -left total hip arthroplasty with patient having significant hypotension postop requiring transfer to ICU and being placed on pressors.  He was weaned off pressor therapy and was able to tolerate p.o. intake with vitals remaining stable prior to discharge    Reviewed progress note from earlier today, nurse reporting blood pressure of 77/44.  Prior to this, blood pressures had been in 90s to 100s systolic for the past month        Past Medical History     Medical History and Problem List   Past Medical History:   Diagnosis Date    Anxiety disorder     BPH (benign prostatic hyperplasia)     CAD (coronary artery disease)     Cardiomyopathy (H)     Cataract     Cervical cord myelomalacia (H)     CHF (congestive heart failure) (H)     Chronic ischemic heart disease     Chronic pain syndrome with opioid dependence     CKD (chronic kidney disease) stage 3, GFR 30-59 ml/min (H)     Closed comminuted intertrochanteric fracture of proximal end of left femur (H)     Closed  fracture of neck of left femur (H)     Congenital multiple renal cysts     Cyst of right kidney     Dysarthria     Dyslipidemia     Dysphagia     Dyspnea     Exudative age-related macular degeneration of right eye with inactive choroidal neovascularization (H)     Femur fracture, left 09/2024    Folic acid deficiency     GERD (gastroesophageal reflux disease)     Hyperglycemia     Hyperlipidemia     Hypertension     Insomnia     Macular degeneration     MDD (major depressive disorder)     Mixed hyperlipidemia     Obesity     RAYMOND (obstructive sleep apnea)     Osteoarthritis     Osteoporosis     Pathological fracture of left femur due to age-related osteoporosis (H)     Primary osteoarthritis of right knee     PVD (peripheral vascular disease) (H)     Renal insufficiency syndrome     SAH (subarachnoid hemorrhage) (H)     Somnolence     Spinal stenosis     TBI (traumatic brain injury) (H)     Toxic encephalopathy     Type 2 diabetes mellitus (H)     Urinary incontinence        Medications   acetaminophen (TYLENOL) 500 MG tablet  aspirin (ASA) 81 MG chewable tablet  bisacodyl (DULCOLAX) 10 MG suppository  carvedilol (COREG) 3.125 MG tablet  DULoxetine (CYMBALTA) 30 MG capsule  empagliflozin (JARDIANCE) 10 MG TABS tablet  folic acid (FOLVITE) 1 MG tablet  HYDROmorphone (DILAUDID) 2 MG tablet  hydrOXYzine HCl (ATARAX) 10 MG tablet  latanoprost (XALATAN) 0.005 % ophthalmic solution  metFORMIN (GLUCOPHAGE) 500 MG tablet  nystatin (MYCOSTATIN) 076625 UNIT/GM external powder  ondansetron (ZOFRAN ODT) 4 MG ODT tab  polyethylene glycol (MIRALAX) 17 g packet  sacubitril-valsartan (ENTRESTO) 24-26 MG per tablet  senna-docusate (SENOKOT-S/PERICOLACE) 8.6-50 MG tablet  tamsulosin (FLOMAX) 0.4 MG 24 hr capsule  trolamine salicylate (ASPERCREME) 10 % external cream  vitamin D3 (CHOLECALCIFEROL) 50 mcg (2000 units) tablet        Surgical History   Past Surgical History:   Procedure Laterality Date    ARTHROPLASTY HIP Left 10/1/2024     "Procedure: LEFT TOTAL HIP ARTHROPLASTY;  Surgeon: Kathryn Nicole MD;  Location: [facility]    CARPAL TUNNEL RELEASE      CERVICAL FUSION      CERVICAL SPINE SURGERY  2009    X2    CORONARY ARTERY BYPASS GRAFT  2003    KIDNEY CYST REMOVAL      LUMBAR DECOMPRESSION L3-S1  05/2010    LUMBAR SPINE SURGERY  2009    PTCA OF LAD (FOR FAILED GRAFT FAILURE)  2004    REMOVE HARDWARE HIP NAILING Left 10/1/2024    Procedure: LEFT FEMORAL INTRAMEDULARY NAIL REMOVAL;  Surgeon: Kathryn Nicole MD;  Location: [facility]    TOTAL HIP ARTHROPLASTY Right 02/21/2020    ULNAR TUNNEL RELEASE  2012       Physical Exam     Patient Vitals for the past 24 hrs:   BP Temp Temp src Pulse Resp SpO2 Height Weight   11/01/24 2215 105/51 -- -- 73 18 99 % -- --   11/01/24 2130 103/50 -- -- 73 13 96 % -- --   11/01/24 2115 (!) 87/53 -- -- 69 11 96 % -- --   11/01/24 2100 90/53 -- -- 78 -- 97 % -- --   11/01/24 2045 92/56 -- -- 80 -- -- -- --   11/01/24 2031 93/50 97.6  F (36.4  C) Oral 80 -- 96 % 1.803 m (5' 11\") 78 kg (172 lb)     Physical Exam  General: Alert and cooperative with exam. Patient in no apparent distress. Normal mentation. Somewhat slurred speech.   Head:  Scalp is NC/AT  Eyes:  No scleral icterus, PERRL, EOM intact  ENT:  The external nose and ears are normal. Dry lips with dry mucous membranes  Neck:  Normal range of motion without rigidity.  CV:  Regular rate and rhythm    No pathologic murmur   Resp:  Breath sounds are clear bilaterally    Non-labored, no retractions or accessory muscle use  GI:  Abdomen is soft, no distension, no tenderness. No peritoneal signs.    :  Diaz catheter in place with red-tinged urine within the leg bag  MS:  No lower extremity edema   Skin:  Warm and dry, No rash or lesions noted.  Neuro:  Oriented x 3. Slurred speech. Cranial nerves all intact. Normal sensation and motor function to bilateral upper and lower extremities.       Diagnostics     Lab Results   Labs Ordered and Resulted from Time of ED " Arrival to Time of ED Departure   BASIC METABOLIC PANEL - Abnormal       Result Value    Sodium 132 (*)     Potassium 3.9      Chloride 97 (*)     Carbon Dioxide (CO2) 17 (*)     Anion Gap 18 (*)     Urea Nitrogen 47.6 (*)     Creatinine 1.36 (*)     GFR Estimate 53 (*)     Calcium 9.7      Glucose 261 (*)    ROUTINE UA WITH MICROSCOPIC REFLEX TO CULTURE - Abnormal    Color Urine Light Brown (*)     Appearance Urine Cloudy (*)     Glucose Urine >=1000 (*)     Bilirubin Urine Negative      Ketones Urine Negative      Specific Gravity Urine 1.017      Blood Urine Large (*)     pH Urine 8.0 (*)     Protein Albumin Urine 50 (*)     Urobilinogen Urine Normal      Nitrite Urine Negative      Leukocyte Esterase Urine Large (*)     WBC Clumps Urine Present (*)     Mucus Urine Present (*)     RBC Urine >182 (*)     WBC Urine >182 (*)    CBC WITH PLATELETS AND DIFFERENTIAL - Abnormal    WBC Count 8.5      RBC Count 3.59 (*)     Hemoglobin 11.9 (*)     Hematocrit 35.6 (*)     MCV 99      MCH 33.1 (*)     MCHC 33.4      RDW 13.1      Platelet Count 180      % Neutrophils 83      % Lymphocytes 11      % Monocytes 6      % Eosinophils 0      % Basophils 0      % Immature Granulocytes 1      NRBCs per 100 WBC 0      Absolute Neutrophils 7.0      Absolute Lymphocytes 0.9      Absolute Monocytes 0.5      Absolute Eosinophils 0.0      Absolute Basophils 0.0      Absolute Immature Granulocytes 0.1      Absolute NRBCs 0.0     MAGNESIUM - Abnormal    Magnesium 2.5 (*)    LACTIC ACID WHOLE BLOOD WITH 1X REPEAT IN 2 HR WHEN >2 - Normal    Lactic Acid, Initial 2.0     INR - Normal    INR 1.13     PARTIAL THROMBOPLASTIN TIME - Normal    aPTT 34     TYPE AND SCREEN, ADULT    ABO/RH(D) B NEG      Antibody Screen Negative      SPECIMEN EXPIRATION DATE [id number]     BLOOD CULTURE   URINE CULTURE   ABO/RH TYPE AND SCREEN       Imaging   XR Chest 2 Views   Preliminary Result   IMPRESSION: Sternotomy. Normal heart size and pulmonary  vascularity. No acute infiltrates or consolidation. Aortic calcification. Old right posterior rib fractures. Fusion hardware at the cervicothoracic junction. Remainder unremarkable. No definite    acute findings.      CT Head w/o Contrast   Preliminary Result   IMPRESSION:   1.  No CT evidence for acute intracranial process.   2.  Brain atrophy and presumed chronic microvascular ischemic changes as above.      MR Brain w/o & w Contrast    (Results Pending)   MRA Neck (Carotids) wo & w Contrast    (Results Pending)   MRA Brain ([place]) wo Contrast    (Results Pending)         Independent Interpretation   CXR: No pneumothorax, infiltrate, or mediastinal widening.    ED Course      Medications Administered   Medications   cefTRIAXone (ROCEPHIN) 1 g vial to attach to  mL bag for ADULTS or NS 50 mL bag for PEDS (has no administration in time range)   sodium chloride 0.9% BOLUS 1,000 mL (1,000 mLs Intravenous $New Bag 11/1/24 2126)       Procedures   Procedures     Discussion of Management   Admitting Hospitalist, Dr. Moreno    ED Course   ED Course as of 11/01/24 2231 Fri Nov 01, 2024 2230 Consulted with Dr. Moreno with inpatient hospitalist service       Additional Documentation  None    Medical Decision Making / Diagnosis     CMS Diagnoses: Lactic acid elevated due to dehydration and UTI. At this time there are no signs of sepsis or septic shock    MIPS       None    MDM   Chris Bhakta is a 78 year old male who presents from TCU with hematuria and hypotension.  Patient is not anticoagulated.  Upon arrival to ER, blood pressure noted to be 87/53 however prior to arrival, blood pressure was 77/44 at his facility.  Urine within his bag does appear to be blood-tinged.  He denies any abdominal pain and has benign abdominal exam.  Patient noted to have somewhat garbled/slurred speech.  Daughter states that slurring is new within the last week.  No other neurologic deficits noted on exam.  CT  head was completed to further evaluate patient's cause of slurring, no evidence of intracranial bleed or other acute cause of patient's symptoms.  MRI/MRA ordered.  Uncertain if slurring neurologic or due to severe dehydration as patient does appear to be significantly dry with very dry mucous membranes.  Labs remarkable for EBONI along with UTI with hematuria.  IV Rocephin started in the ER.  Chest x-ray was also ordered due to patient's hypotension and thankfully does not show any evidence of pneumonia.  With patient's UTI and hypotension, plan for admission for further cares.  Consulted with Dr. Moreno with inpatient hospitalist service who accepts patient's admission.    Disposition   The patient was admitted to the hospital.     Diagnosis     ICD-10-CM    1. Hematuria  R31.9       2. Hypotension, unspecified hypotension type  I95.9       3. EBONI (acute kidney injury) (H)  N17.9       4. UTI (urinary tract infection)  N39.0       5. Slurred speech  R47.81            Discharge Medications   New Prescriptions    No medications on file         MATTHEW Rich Lauren R, PA-C  11/01/24 8320

## 2024-11-02 NOTE — PLAN OF CARE
"Goal Outcome Evaluation:      Plan of Care Reviewed With: patient    Overall Patient Progress: no changeOverall Patient Progress: no change    Patient Name: aSntosh  MRN: [id number]  Date of Admission: 11/1/2024  Reason for Admission: Slurred Speech  Level of Care: neuro    Vitals:   BP Readings from Last 1 Encounters:  11/02/24 : 114/58    Pulse Readings from Last 1 Encounters:  11/02/24 : 84    Wt Readings from Last 1 Encounters:  11/02/24 : 75.5 kg (166 lb 7.2 oz)    Ht Readings from Last 1 Encounters:  11/01/24 : 1.803 m (5' 11\")    Estimated body mass index is 23.21 kg/m  as calculated from the following:    Height as of this encounter: 1.803 m (5' 11\").    Weight as of this encounter: 75.5 kg (166 lb 7.2 oz).  Temp Readings from Last 1 Encounters:  11/02/24 : 98  F (36.7  C) (Oral)      Pain: Pain goal 0 Pain Rating 0 Effective pain medication/regimen none    CV Surgery Patient: No    Assessment    Resp: Clear lung sounds,room air.  Telemetry: NSR  Diet: NPO  Neuro: Alert and oriented x 3,Neuro intact except for slurred speech,follow command with general weakness.  GI/:No Bm on this shift,Diaz catheter in placed with adequate urine output,pinkish to clear color.  Skin/Wounds: Scattered scabs,redness on perineal area,Open wound on sacral.  Lines/Drains: R PIV infusing with NS x 100 ml/hr  Activity: Assist x 2,T/R  Sleep: Sleep between cares  Abnormal Labs: see chart    Aggression Stop Light: Green          Patient Care Plan: Continue plan of care,pending CT head.  "

## 2024-11-02 NOTE — PLAN OF CARE
Goal Outcome Evaluation:      Plan of Care Reviewed With: patient, child          Outcome Evaluation: Return to TCU pending Medical Stability.

## 2024-11-02 NOTE — CONSULTS
[facility]    Stroke Consult Note    Reason for Consult:  Stroke     Chief Complaint: Hematuria       HPI  Chris Bhakta is a 78 year old male with past medical history significant for traumatic subarachnoid hemorrhage, congestive heart failure, hypertension, hyperlipidemia diabetes mellitus type 2, coronary disease status post CABG and PCI presents with hypotension and hematuria.     Patient is a poor historian, patient states that he is not sure he was brought in to hospital, reportedly he  was recently discharged to TCU on 10/3 after he underwent left total hip arthroplasty degenerative joint disease. In TCU was noted to have hematuria and hypotension, was also noted to have dysarthria for about a week.    On admission, BP 90/62, UA showing showing findings concerning for UTI, MRI brain a tiny infarct of left occipital lobe, MRA head and neck showed focal high-grade stenosis proximal right subclavian artery.       Stroke Evaluation Summarized    MRI/Head CT MRI brain a tiny infarct of left occipital lobe   Intracranial Vasculature No LVO   Cervical Vasculature MRA neck showed  focal high-grade stenosis proximal right subclavian artery.      Echocardiogram pending   EKG/Telemetry Sinus rhythm with second degreee ( Mobitz 1) AV block   Non-specific intra-ventricular conduction block    Other Testing Not Applicable      LDL  No lab value available in past 30 days   A1C  10/2/2024: 6.2 %   Troponin 11/1/2024: 50 ng/L       Impression  Acute ischemic stroke of L occipital region due to embolic stroke of undetermined source (ESUS)      78 year old male with past medical history significant for traumatic subarachnoid hemorrhage, congestive heart failure, hypertension, hyperlipidemia diabetes mellitus type 2, coronary disease status post CABG and PCI presents with hypotension and hematuria. On admission, BP 90/62, UA showing showing findings concerning for UTI, MRI brain a tiny  "infarct of left occipital lobe, MRA head and neck showed focal high-grade stenosis proximal right subclavian artery.  Findings of contralateral subclavian focal stenosis could be an incidental, but will further investigate with CTA to better characterize right subclavian stenosis.  Etiology of ischemic stroke could be due to ESUS vs small vessel disease, will do further stroke workup and manage as needed.       Recommendations   - Use orderset: \"Ischemic Stroke Routine Admission\" or \"Ischemic Stroke No Thrombolytics/No Thrombectomy ICU Admission\"  - Place Neurology IP Stroke Consult order   - Neurochecks and Vital Signs every 4 hrs   - Permissive HTN; goal SBP < 220 mmHg for 24 hrs followed by gradual reduction to a long term goal of <130/80  - Daily aspirin 81 mg for secondary stroke prevention  - CT head and neck  - Telemetry, EKG  - Bedside Glucose Monitoring  - A1c, Lipid Panel, Troponin x 3  - PT/OT/SLP  - Stroke Education  - Euthermia, Euglycemia  - TTE  - CTA head and neck     Patient Follow-up    - in 6-8 weeks with general neurology or stroke IMER ([phone])    Thank you for this consult. We will continue to follow.     The Stroke Staff is Dr. Chery.    Td Rothman MD  Vascular Neurology Fellow    To page me or covering stroke neurology team member, click here: [facility]  Choose \"On Call\" tab at top, then select \"NEUROLOGY/ALL SITES\" from middle drop-down box, press Enter, then look for \"stroke\" or \"telestroke\" for your site.  _____________________________________________________    Clinically Significant Risk Factors Present on Admission         # Hyponatremia: Lowest Na = 132 mmol/L in last 2 days, will monitor as appropriate  # Hypochloremia: Lowest Cl = 97 mmol/L in last 2 days, will monitor as appropriate        # Drug Induced Platelet Defect: home medication list includes an antiplatelet medication    # Heart failure with preserved ejection fraction: EF >50% and home medication list " includes sacubitril/valsartan (Entresto)                   Past Medical History    Past Medical History:   Diagnosis Date    Anxiety disorder     BPH (benign prostatic hyperplasia)     CAD (coronary artery disease)     Cardiomyopathy (H)     Cataract     Cervical cord myelomalacia (H)     CHF (congestive heart failure) (H)     Chronic ischemic heart disease     Chronic pain syndrome with opioid dependence     CKD (chronic kidney disease) stage 3, GFR 30-59 ml/min (H)     Closed comminuted intertrochanteric fracture of proximal end of left femur (H)     Closed fracture of neck of left femur (H)     Congenital multiple renal cysts     Cyst of right kidney     Dysarthria     Dyslipidemia     Dysphagia     Dyspnea     Exudative age-related macular degeneration of right eye with inactive choroidal neovascularization (H)     Femur fracture, left 09/2024    surgery in october    Folic acid deficiency     GERD (gastroesophageal reflux disease)     Hyperglycemia     Hyperlipidemia     Hypertension     Insomnia     Macular degeneration     MDD (major depressive disorder)     Mixed hyperlipidemia     Obesity     RAYMOND (obstructive sleep apnea)     Osteoarthritis     Osteoporosis     Pathological fracture of left femur due to age-related osteoporosis (H)     Primary osteoarthritis of right knee     PVD (peripheral vascular disease) (H)     Renal insufficiency syndrome     SAH (subarachnoid hemorrhage) (H)     Somnolence     Spinal stenosis     TBI (traumatic brain injury) (H)     Toxic encephalopathy     Type 2 diabetes mellitus (H)     Urinary incontinence      Medications   Home Meds  Prior to Admission medications    Medication Sig Start Date End Date Taking? Authorizing Provider   acetaminophen (TYLENOL) 500 MG tablet Take 1,000 mg by mouth 3 times daily.   Yes Reported, Patient   aspirin (ASA) 81 MG chewable tablet Take 1 tablet (81 mg) by mouth daily. 10/23/24  Yes Haase, Tonya Lynn, APRN CNP   bisacodyl (DULCOLAX) 10 MG  suppository Place 10 mg rectally daily as needed for constipation.   Yes Reported, Patient   carvedilol (COREG) 3.125 MG tablet Take 1 tablet (3.125 mg) by mouth 2 times daily (with meals). 10/31/24  Yes Haase, Tonya Lynn, APRN CNP   DULoxetine (CYMBALTA) 30 MG capsule Take 30 mg by mouth 2 times daily. 0900 & 1900   Yes Reported, Patient   empagliflozin (JARDIANCE) 10 MG TABS tablet Take 10 mg by mouth daily.   Yes Reported, Patient   folic acid (FOLVITE) 1 MG tablet Take 1 mg by mouth daily.   Yes Reported, Patient   HYDROmorphone (DILAUDID) 2 MG tablet Take 1-2 tablets (2-4 mg) by mouth every 4 hours as needed for moderate pain. 10/17/24  Yes Blaise Rodriguez APRN CNP   hydrOXYzine HCl (ATARAX) 10 MG tablet Take 1 tablet (10 mg) by mouth every 6 hours as needed for other (adjuvant pain). 10/3/24  Yes Les Douglas PA-C   latanoprost (XALATAN) 0.005 % ophthalmic solution Place 1 drop into both eyes at bedtime.   Yes Reported, Patient   metFORMIN (GLUCOPHAGE) 500 MG tablet Take 1 tablet (500 mg) by mouth daily (with breakfast). Diabetes mellitus 10/5/24  Yes Mel Soriano MD   nystatin (MYCOSTATIN) 414449 UNIT/GM external powder Apply topically 2 times daily as needed for other (Rash).   Yes Reported, Patient   ondansetron (ZOFRAN ODT) 4 MG ODT tab Take 1 tablet (4 mg) by mouth every 6 hours as needed for nausea or vomiting. 10/3/24  Yes Les Douglas PA-C   polyethylene glycol (MIRALAX) 17 g packet Take 1 packet by mouth daily as needed for constipation   Yes Reported, Patient   sacubitril-valsartan (ENTRESTO) 24-26 MG per tablet Take 1 tablet by mouth 2 times daily. 0900 & 1900  hypertension 10/5/24  Yes Mel Soriano MD   senna-docusate (SENOKOT-S/PERICOLACE) 8.6-50 MG tablet Take 2 tablets by mouth 2 times daily.   Yes Reported, Patient   tamsulosin (FLOMAX) 0.4 MG 24 hr capsule Take 0.4 mg by mouth every morning   Yes Unknown, Entered By History   trolamine salicylate (ASPERCREME)  10 % external cream Apply topically daily as needed for moderate pain.   Yes Reported, Patient   vitamin D3 (CHOLECALCIFEROL) 50 mcg (2000 units) tablet Take 1 tablet by mouth daily.   Yes Reported, Patient       Scheduled Meds  Current Facility-Administered Medications   Medication Dose Route Frequency Provider Last Rate Last Admin    acetaminophen (TYLENOL) tablet 1,000 mg  1,000 mg Oral TID Anton Moreno MD        aspirin (ASA) chewable tablet 81 mg  81 mg Oral Daily Anton Moreno MD        aspirin EC tablet 81 mg  81 mg Oral Daily Anton Moreno MD        Or    aspirin (ASA) chewable tablet 81 mg  81 mg Oral or NG Tube Daily Anton Moreno MD        aspirin (ASA) Suppository 75 mg  75 mg Rectal Daily Bravo Frias MD        [Held by provider] carvedilol (COREG) tablet 3.125 mg  3.125 mg Oral BID w/meals Anton Moreno MD        cefTRIAXone (ROCEPHIN) 1 g vial to attach to  mL bag for ADULTS or NS 50 mL bag for PEDS  1 g Intravenous Q24H Anton Moreno MD        DULoxetine (CYMBALTA) DR capsule 30 mg  30 mg Oral BID Anton Moreno MD        latanoprost (XALATAN) 0.005 % ophthalmic solution 1 drop  1 drop Both Eyes At Bedtime Anton Moreno MD        polyethylene glycol (MIRALAX) Packet 17 g  17 g Oral or NG Tube Daily Anton Moreno MD        [Held by provider] sacubitril-valsartan (ENTRESTO) 24-26 MG per tablet 1 tablet  1 tablet Oral BID Anton Moreno MD        senna-docusate (SENOKOT-S/PERICOLACE) 8.6-50 MG per tablet 2 tablet  2 tablet Oral BID Anton Moreno MD        sodium chloride (PF) 0.9% PF flush 3 mL  3 mL Intracatheter Q8H Anton Moreno MD   3 mL at 11/02/24 0416    tamsulosin (FLOMAX) capsule 0.4 mg  0.4 mg Oral QAM Anton Moreno MD        vitamin D3 (CHOLECALCIFEROL) tablet 50 mcg  50 mcg Oral Daily Anton Moreno MD           Infusion  Meds  Current Facility-Administered Medications   Medication Dose Route Frequency Provider Last Rate Last Admin    sodium chloride 0.9 % infusion   Intravenous Continuous Anton Moreno  mL/hr at 11/02/24 0417 New Bag at 11/02/24 0417       Allergies   Allergies   Allergen Reactions    Iodinated Contrast Media Shortness Of Breath    Atorvastatin Fatigue    Lisinopril Cough    Oxycodone Confusion    Sertraline Dizziness          PHYSICAL EXAMINATION   Temp:  [97.6  F (36.4  C)-98.9  F (37.2  C)] 98.9  F (37.2  C)  Pulse:  [69-88] 84  Resp:  [11-21] 14  BP: ()/(50-63) 118/63  SpO2:  [96 %-99 %] 97 %    Neurologic  Mental Status:  alert, oriented x 2, follows commands, dysarthric speech  Cranial Nerves:  visual fields intact, PERRL, EOMI with normal smooth pursuit, facial sensation intact and symmetric, mild R droop, hearing not formally tested but intact to conversation, palate elevation symmetric and uvula midline, no dysarthria, shoulder shrug strong bilaterally, tongue protrusion midline  Motor:  normal muscle tone and bulk, no abnormal movements, able to move all limbs spontaneously, strength 5/5 throughout upper and lower extremities  Reflexes:  toes down-going  Sensory:  light touch sensation intact and symmetric throughout upper and lower extremities, no extinction on double simultaneous stimulation   Coordination:   Normal FTN  Station/Gait:  deferred    Stroke Scales    NIHSS  1a. Level of Consciousness 0-->Alert, keenly responsive   1b. LOC Questions 0-->Answers both questions correctly   1c. LOC Commands 0-->Performs both tasks correctly   2.   Best Gaze 0-->Normal   3.   Visual 0-->No visual loss   4.   Facial Palsy 1-->Minor paralysis (flattened nasolabial fold, asymmetry on smiling)   5a. Motor Arm, Left 0-->No drift, limb holds 90 (or 45) degrees for full 10 secs   5b. Motor Arm, Right 0-->No drift, limb holds 90 (or 45) degrees for full 10 secs   6a. Motor Leg, Left 0-->No drift,  "leg holds 30 degree position for full 5 secs   6b. Motor Leg, right 0-->No drift, leg holds 30 degree position for full 5 secs   7.   Limb Ataxia 0-->Absent (did not perform HTN)   8.   Sensory 0-->Normal, no sensory loss   9.   Best Language 1-->Mild-to-moderate aphasia, some obvious loss of fluency or facility of comprehension, without significant limitation on ideas expressed or form of expression. Reduction of speech and/or comprehension, however, makes conversation. . . (see row details)   10. Dysarthria 1-->Mild-to-moderate dysarthria, patient slurs at least some words and, at worst, can be understood with some difficulty   11. Extinction and Inattention  0-->No abnormality   Total 3 (11/02/24 1018)       Imaging  I personally reviewed all imaging; relevant findings per HPI.    Labs Data   CBC  Recent Labs   Lab 11/01/24 2058   WBC 8.5   RBC 3.59*   HGB 11.9*   HCT 35.6*        Basic Metabolic Panel   Recent Labs   Lab 11/02/24  0648 11/02/24  0340 11/01/24 2058   NA  --   --  132*   POTASSIUM  --   --  3.9   CHLORIDE  --   --  97*   CO2  --   --  17*   BUN  --   --  47.6*   CR  --   --  1.36*   * 196* 261*   VIVIAN  --   --  9.7     Liver Panel  No results for input(s): \"PROTTOTAL\", \"ALBUMIN\", \"BILITOTAL\", \"ALKPHOS\", \"AST\", \"ALT\", \"BILIDIRECT\" in the last 168 hours.  INR    Recent Labs   Lab Test 11/01/24  2124 10/01/24  2242 09/26/16  1402   INR 1.13 1.24* 1.08           Stroke Consult Data Data   This was a non-emergent, non-telestroke consult.  "

## 2024-11-02 NOTE — H&P
[facility]    History and Physical - Hospitalist Service       Date of Admission:  11/1/2024    Assessment & Plan      Chris Bhakta is a 78 year old male admitted on 11/1/2024. He presents with hematuria and hypotension    Hematuria  UTI  Hypotension, likely hypovolemic  Urinary retention s/p willis placement  Pt recently underwent L YELENA for DJD and discharged 10/3 to TCU. Had urinary retention and willis placed. Today noted to have hematuria, also noted hypotension, BP at care facility 77/44 today (87/53 in ED initially). BPs in ED 90-100s systolic (baseline). WBC 8.5. UA w/ 182 RBC, WBCs, WBC clumps. CXR w/o acute process.   - blood culture pending  - urine culture pending  - ceftriaxone 1g IV q24  - IV fluids  - tamsulosin 0.4 mg daily    CVA  Hx traumatic SAH  Dtr noted pt had slurred speech this week at TCU. Noted to have very dry MM in the ED. CT head in ED unremarkable. MRI w/ tiny acute infarct L occipital lobe, MRA w/ stenosis R subclavian, dissection not excluded.   *d/w neurology, start low dose ASA for now given hematuria  - telemetry  - q4 neuro checks  - ASA 81 mg daily for now  - neurology consult  - CTA head and neck (listed contrast allergy but has received multiple times w/o pretreatment in recent past)  - A1c, lipids, troponins  - echo  - PT/OT/SLP    BEONI, mild  Mild anion gap acidosis  Baseline creatinine 0.8-0.9. Creatinine in the ED 1.36. lactic 2.0.   - IV fluids  - monitor  - repeat panel in am given acidosis    Chronic HFpEF  CAD s/p CABG 2009, PCI  HTN  HLD  [Needs rec- coreg 3.125 mg BID, Jardiance 10 mg daily, entresto BID]  Recent echo 10/2024 EF 55-60% w/o WMA.   - hold antihypertensives for permissive hypertension  - resume other meds with rec    DM II  Recent A1c 10/2/24 6.2%.   - hold metformin  - BID and HS blood sugars    L hip DJD s/p YELENA  Chronic pain syndrome  On chronic narcotics, appears related to cervical spine disease. 10/1 s/p YELENA. Had  postoperative hypotension requiring ICU and pressors.   - PT  - TID acetaminophen  - dilaudid 2-4 mg q4 prn  - duloxetine 30 mg BID          Diet:  Regular  DVT Prophylaxis: Pneumatic Compression Devices  Diaz Catheter: PRESENT, indication:    Lines: None     Cardiac Monitoring: None  Code Status:  DNR/DNI, discussed on admission    Clinically Significant Risk Factors Present on Admission         # Hyponatremia: Lowest Na = 132 mmol/L in last 2 days, will monitor as appropriate  # Hypochloremia: Lowest Cl = 97 mmol/L in last 2 days, will monitor as appropriate        # Drug Induced Platelet Defect: home medication list includes an antiplatelet medication    # Heart failure with preserved ejection fraction: EF >50% and home medication list includes sacubitril/valsartan (Entresto)                   Disposition Plan     Medically Ready for Discharge: Anticipated in 2-4 Days           Anton Moreno MD  Hospitalist Service  [facility]  Securely message with [employer] (more info)  Text page via [employer] Paging/Directory     ______________________________________________________________________    Chief Complaint   Hematuria, hypotension    History is obtained from the patient, electronic health record, and emergency department physician    History of Present Illness   Chris Bhakta is a 78 year old male who presents with hematuria possible slurred speech.  He has history of coronary disease, chronic diastolic heart failure, diabetes, chronic pain among others.  He was recently hospitalized in early October for a left total hip arthroplasty for DJD.  He was discharged on October 3 to the TCU.  At some point he had a Diaz catheter placed for urinary retention.  Today care facility noted that he had hematuria.  They also checked his blood pressure and found it to be in the 70s systolic.  He was promptly sent to the emergency department.  His daughter also had noted that she thought it might  of had slurred speech over the last couple of days.  On my evaluation he is comfortable.  He may be dysarthric but he also has very dry mouth so it is difficult to assess.  He denies any pain complaints other than his hips and his knees although currently those are not in pain.  He denies chest pain or shortness of breath.  Denies abdominal pain.  Denies vision changes.  Denies upper or lower extremity weakness although does not sound like his walked very much.      Past Medical History    Past Medical History:   Diagnosis Date    Anxiety disorder     BPH (benign prostatic hyperplasia)     CAD (coronary artery disease)     Cardiomyopathy (H)     Cataract     Cervical cord myelomalacia (H)     CHF (congestive heart failure) (H)     Chronic ischemic heart disease     Chronic pain syndrome with opioid dependence     CKD (chronic kidney disease) stage 3, GFR 30-59 ml/min (H)     Closed comminuted intertrochanteric fracture of proximal end of left femur (H)     Closed fracture of neck of left femur (H)     Congenital multiple renal cysts     Cyst of right kidney     Dysarthria     Dyslipidemia     Dysphagia     Dyspnea     Exudative age-related macular degeneration of right eye with inactive choroidal neovascularization (H)     Femur fracture, left 09/2024    surgery in october    Folic acid deficiency     GERD (gastroesophageal reflux disease)     Hyperglycemia     Hyperlipidemia     Hypertension     Insomnia     Macular degeneration     MDD (major depressive disorder)     Mixed hyperlipidemia     Obesity     RAYMOND (obstructive sleep apnea)     Osteoarthritis     Osteoporosis     Pathological fracture of left femur due to age-related osteoporosis (H)     Primary osteoarthritis of right knee     PVD (peripheral vascular disease) (H)     Renal insufficiency syndrome     SAH (subarachnoid hemorrhage) (H)     Somnolence     Spinal stenosis     TBI (traumatic brain injury) (H)     Toxic encephalopathy     Type 2 diabetes  mellitus (H)     Urinary incontinence        Past Surgical History   Past Surgical History:   Procedure Laterality Date    ARTHROPLASTY HIP Left 10/1/2024    Procedure: LEFT TOTAL HIP ARTHROPLASTY;  Surgeon: Kathryn Nicole MD;  Location: [facility] OR    CARPAL TUNNEL RELEASE      CERVICAL FUSION      CERVICAL SPINE SURGERY  2009    X2    CORONARY ARTERY BYPASS GRAFT  2003    KIDNEY CYST REMOVAL      LUMBAR DECOMPRESSION L3-S1  05/2010    LUMBAR SPINE SURGERY  2009    PTCA OF LAD (FOR FAILED GRAFT FAILURE)  2004    REMOVE HARDWARE HIP NAILING Left 10/1/2024    Procedure: LEFT FEMORAL INTRAMEDULARY NAIL REMOVAL;  Surgeon: Kathryn Nicole MD;  Location: [facility] OR    TOTAL HIP ARTHROPLASTY Right 02/21/2020    ULNAR TUNNEL RELEASE  2012       Prior to Admission Medications   Prior to Admission Medications   Prescriptions Last Dose Informant Patient Reported? Taking?   DULoxetine (CYMBALTA) 30 MG capsule  Care Giver Yes No   Sig: Take 30 mg by mouth 2 times daily. 0900 & 1900   HYDROmorphone (DILAUDID) 2 MG tablet   No No   Sig: Take 1-2 tablets (2-4 mg) by mouth every 4 hours as needed for moderate pain.   acetaminophen (TYLENOL) 500 MG tablet  Care Giver Yes No   Sig: Take 1,000 mg by mouth 3 times daily.   aspirin (ASA) 81 MG chewable tablet   No No   Sig: Take 1 tablet (81 mg) by mouth daily.   bisacodyl (DULCOLAX) 10 MG suppository  Care Giver Yes No   Sig: Place 10 mg rectally daily as needed for constipation.   carvedilol (COREG) 3.125 MG tablet   No No   Sig: Take 1 tablet (3.125 mg) by mouth 2 times daily (with meals).   empagliflozin (JARDIANCE) 10 MG TABS tablet  Care Giver Yes No   Sig: Take 10 mg by mouth daily.   folic acid (FOLVITE) 1 MG tablet  Care Giver Yes No   Sig: Take 1 mg by mouth daily.   hydrOXYzine HCl (ATARAX) 10 MG tablet   No No   Sig: Take 1 tablet (10 mg) by mouth every 6 hours as needed for other (adjuvant pain).   latanoprost (XALATAN) 0.005 % ophthalmic solution  Care Giver Yes No   Sig:  Place 1 drop into both eyes at bedtime.   metFORMIN (GLUCOPHAGE) 500 MG tablet   No No   Sig: Take 1 tablet (500 mg) by mouth daily (with breakfast). Diabetes mellitus   nystatin (MYCOSTATIN) 208646 UNIT/GM external powder  Care Giver Yes No   Sig: Apply topically 2 times daily as needed for other (Rash).   ondansetron (ZOFRAN ODT) 4 MG ODT tab   No No   Sig: Take 1 tablet (4 mg) by mouth every 6 hours as needed for nausea or vomiting.   polyethylene glycol (MIRALAX) 17 g packet  Care Giver Yes No   Sig: Take 1 packet by mouth daily as needed for constipation   sacubitril-valsartan (ENTRESTO) 24-26 MG per tablet   No No   Sig: Take 1 tablet by mouth 2 times daily. 0900 & 1900  hypertension   senna-docusate (SENOKOT-S/PERICOLACE) 8.6-50 MG tablet  Care Giver Yes No   Sig: Take 2 tablets by mouth 2 times daily.   tamsulosin (FLOMAX) 0.4 MG 24 hr capsule  Care Giver Yes No   Sig: Take 0.4 mg by mouth every morning   trolamine salicylate (ASPERCREME) 10 % external cream  Care Giver Yes No   Sig: Apply topically as needed for moderate pain.   vitamin D3 (CHOLECALCIFEROL) 50 mcg (2000 units) tablet  Care Giver Yes No   Sig: Take 1 tablet by mouth daily.      Facility-Administered Medications: None        Review of Systems    The 10 point Review of Systems is negative other than noted in the HPI or here.     Social History   I have reviewed this patient's social history and updated it with pertinent information if needed.  Social History     Tobacco Use    Smoking status: Former     Current packs/day: 0.00     Average packs/day: 0.5 packs/day for 53.9 years (26.9 ttl pk-yrs)     Types: Cigarettes     Start date: 1970     Quit date: 2024     Years since quittin.2    Smokeless tobacco: Never   Vaping Use    Vaping status: Never Used   Substance Use Topics    Alcohol use: Not Currently     Comment: rare        Physical Exam   Vital Signs: Temp: 97.6  F (36.4  C) Temp src: Oral BP: 105/51 Pulse: 73   Resp: 18  SpO2: 99 % O2 Device: None (Room air)    Weight: 172 lbs 0 oz    General Appearance: Alert, very pleasant, no distress  Respiratory: CTA B  Cardiovascular: RRR, no murmur. No edema  GI: soft, nt/nd  Skin: no rashes or lesions grossly    Other: CN II-XII intact. STORY, ? R  strength sl weaker. Possibly mildly dysarthric but not clear     Medical Decision Making       80 MINUTES SPENT BY ME on the date of service doing chart review, history, exam, documentation & further activities per the note.      Data   ------------------------- PAST 24 HR DATA REVIEWED -----------------------------------------------    I have personally reviewed the following data over the past 24 hrs:    8.5  \   11.9 (L)   / 180     132 (L) 97 (L) 47.6 (H) /  261 (H)   3.9 17 (L) 1.36 (H) \     Procal: N/A CRP: N/A Lactic Acid: 2.0       INR:  1.13 PTT:  34   D-dimer:  N/A Fibrinogen:  N/A       Imaging results reviewed over the past 24 hrs:   Recent Results (from the past 24 hours)   CT Head w/o Contrast    Narrative    EXAM: CT HEAD W/O CONTRAST  LOCATION: [facility]  DATE: 11/1/2024    INDICATION: Slurred speech for one week  COMPARISON: None.  TECHNIQUE: Routine CT Head without IV contrast. Multiplanar reformats. Dose reduction techniques were used.    FINDINGS:  INTRACRANIAL CONTENTS: No intracranial hemorrhage, extraaxial collection, or mass effect.  No CT evidence of acute infarct. Moderate to severe presumed chronic small vessel ischemic changes. Moderate generalized volume loss. No hydrocephalus.     VISUALIZED ORBITS/SINUSES/MASTOIDS: No intraorbital abnormality. No paranasal sinus mucosal disease. No middle ear or mastoid effusion.    BONES/SOFT TISSUES: No acute abnormality.      Impression    IMPRESSION:  1.  No CT evidence for acute intracranial process.  2.  Brain atrophy and presumed chronic microvascular ischemic changes as above.   XR Chest 2 Views    Narrative    EXAM: XR CHEST 2 VIEWS  LOCATION:   [facility]  DATE: 11/1/2024    INDICATION: Hypotension.  COMPARISON: 9/26/2016.      Impression    IMPRESSION: Sternotomy. Normal heart size and pulmonary vascularity. No acute infiltrates or consolidation. Aortic calcification. Old right posterior rib fractures. Fusion hardware at the cervicothoracic junction. Remainder unremarkable. No definite   acute findings.   MR Brain w/o & w Contrast    Addendum: 11/2/2024    COMMUNICATION ADDENDUM:  Findings were discussed with Dr. Moreno on 11/2/2024 12:54 AM CDT.    END ADDENDUM      Narrative    EXAM: MR BRAIN W/O and W CONTRAST, MRA BRAIN ([place]) W/O CONTRAST, MRA NECK (CAROTIDS) W/O and W CONTRAST  LOCATION: [facility]  DATE: 11/2/2024    INDICATION: slurred speech for 1 week  COMPARISON: Head CT obtained earlier today  CONTRAST: 10mL Gadavist  TECHNIQUE:   1) Routine multiplanar multisequence head MRI without and with intravenous contrast.  2) 3D time-of-flight head MRA without intravenous contrast.  3) Neck MRA without and with IV contrast. Stenosis measurements made according to NASCET criteria unless otherwise specified.    FINDINGS:  HEAD MRI:  Somewhat motion degraded examination.    INTRACRANIAL CONTENTS: Tiny acute infarct involving the periventricular white matter in the left occipital lobe (image 55 series 5 and image 16 series 6). No mass, acute hemorrhage, or extra-axial fluid collections. Small foci of susceptibility artifact   in the pete, bilateral thalami, left occipital lobe, and inferior left cerebellum likely due to old hemorrhage or mineralization. Confluent nonspecific T2/FLAIR hyperintensities within the cerebral white matter and pete most consistent with advanced   chronic microvascular ischemic change. Moderate generalized cerebral atrophy. No hydrocephalus. Normal position of the cerebellar tonsils. No pathologic contrast enhancement.    SELLA: No abnormality accounting for  technique.    OSSEOUS STRUCTURES/SOFT TISSUES: Normal marrow signal. The major intracranial vascular flow voids are maintained.     ORBITS: Prior bilateral cataract surgery. Visualized portions of the orbits are otherwise unremarkable.     SINUSES/MASTOIDS: No paranasal sinus mucosal disease. No middle ear or mastoid effusion.     HEAD MRA:   Somewhat motion degraded examination.    ANTERIOR CIRCULATION: No stenosis/occlusion, aneurysm, or high flow vascular malformation. Fetal origin of the left posterior cerebral artery from the anterior circulation. Prominent right posterior communicating artery is also seen.    POSTERIOR CIRCULATION: No stenosis/occlusion, aneurysm, or high flow vascular malformation. Balanced vertebral arteries supply a normal basilar artery.     NECK MRA:   RIGHT CAROTID: Atherosclerotic plaque results in less than 50% stenosis in the right ICA. No dissection.    LEFT CAROTID: Atherosclerotic plaque results in less than 50% stenosis in the left ICA. No dissection.    VERTEBRAL ARTERIES: No focal stenosis or dissection. Balanced vertebral arteries.    AORTIC ARCH: Classic aortic arch anatomy. Focal high-grade web or shelflike stenosis of the proximal right subclavian artery just beyond the origin.      Impression    IMPRESSION:  HEAD MRI:   1.  Tiny acute infarct periventricular white matter left occipital lobe.  2.  Generalized brain atrophy and presumed microvascular ischemic changes as detailed above.  3.  Foci of old hemorrhage or mineralization brainstem, thalami, left occipital lobe, and left cerebellum.    HEAD MRA:   1.  No aneurysm, high flow AVM or significant stenosis identified.  2.  Variant [place] anatomy as above.    NECK MRA:  1.  Focal high-grade web or shelflike stenosis proximal right subclavian artery. A dissection is not excluded. Consider CTA for further evaluation.  2.  Atherosclerotic plaque at both carotid bifurcations without hemodynamically significant  stenosis.   MRA Neck (Carotids) wo & w Contrast    Addendum: 11/2/2024    COMMUNICATION ADDENDUM:  Findings were discussed with Dr. Moreno on 11/2/2024 12:54 AM CDT.    END ADDENDUM      Narrative    EXAM: MR BRAIN W/O and W CONTRAST, MRA BRAIN ([place] OF [place]ARTAGE[place]) W/O CONTRAST, MRA NECK (CAROTIDS) W/O and W CONTRAST  LOCATION: [facility]  DATE: 11/2/2024    INDICATION: slurred speech for 1 week  COMPARISON: Head CT obtained earlier today  CONTRAST: 10mL Gadavist  TECHNIQUE:   1) Routine multiplanar multisequence head MRI without and with intravenous contrast.  2) 3D time-of-flight head MRA without intravenous contrast.  3) Neck MRA without and with IV contrast. Stenosis measurements made according to NASCET criteria unless otherwise specified.    FINDINGS:  HEAD MRI:  Somewhat motion degraded examination.    INTRACRANIAL CONTENTS: Tiny acute infarct involving the periventricular white matter in the left occipital lobe (image 55 series 5 and image 16 series 6). No mass, acute hemorrhage, or extra-axial fluid collections. Small foci of susceptibility artifact   in the pete, bilateral thalami, left occipital lobe, and inferior left cerebellum likely due to old hemorrhage or mineralization. Confluent nonspecific T2/FLAIR hyperintensities within the cerebral white matter and pete most consistent with advanced   chronic microvascular ischemic change. Moderate generalized cerebral atrophy. No hydrocephalus. Normal position of the cerebellar tonsils. No pathologic contrast enhancement.    SELLA: No abnormality accounting for technique.    OSSEOUS STRUCTURES/SOFT TISSUES: Normal marrow signal. The major intracranial vascular flow voids are maintained.     ORBITS: Prior bilateral cataract surgery. Visualized portions of the orbits are otherwise unremarkable.     SINUSES/MASTOIDS: No paranasal sinus mucosal disease. No middle ear or mastoid effusion.     HEAD MRA:   Somewhat motion degraded  examination.    ANTERIOR CIRCULATION: No stenosis/occlusion, aneurysm, or high flow vascular malformation. Fetal origin of the left posterior cerebral artery from the anterior circulation. Prominent right posterior communicating artery is also seen.    POSTERIOR CIRCULATION: No stenosis/occlusion, aneurysm, or high flow vascular malformation. Balanced vertebral arteries supply a normal basilar artery.     NECK MRA:   RIGHT CAROTID: Atherosclerotic plaque results in less than 50% stenosis in the right ICA. No dissection.    LEFT CAROTID: Atherosclerotic plaque results in less than 50% stenosis in the left ICA. No dissection.    VERTEBRAL ARTERIES: No focal stenosis or dissection. Balanced vertebral arteries.    AORTIC ARCH: Classic aortic arch anatomy. Focal high-grade web or shelflike stenosis of the proximal right subclavian artery just beyond the origin.      Impression    IMPRESSION:  HEAD MRI:   1.  Tiny acute infarct periventricular white matter left occipital lobe.  2.  Generalized brain atrophy and presumed microvascular ischemic changes as detailed above.  3.  Foci of old hemorrhage or mineralization brainstem, thalami, left occipital lobe, and left cerebellum.    HEAD MRA:   1.  No aneurysm, high flow AVM or significant stenosis identified.  2.  Variant [place] of [place] anatomy as above.    NECK MRA:  1.  Focal high-grade web or shelflike stenosis proximal right subclavian artery. A dissection is not excluded. Consider CTA for further evaluation.  2.  Atherosclerotic plaque at both carotid bifurcations without hemodynamically significant stenosis.   MRA Brain ([place] of [place]) wo Contrast    Addendum: 11/2/2024    COMMUNICATION ADDENDUM:  Findings were discussed with Dr. Moreno on 11/2/2024 12:54 AM CDT.    END ADDENDUM      Narrative    EXAM: MR BRAIN W/O and W CONTRAST, MRA BRAIN ([place]) W/O CONTRAST, MRA NECK (CAROTIDS) W/O and W CONTRAST  [facility]  DATE: 11/2/2024    INDICATION: slurred speech for 1 week  COMPARISON: Head CT obtained earlier today  CONTRAST: 10mL Gadavist  TECHNIQUE:   1) Routine multiplanar multisequence head MRI without and with intravenous contrast.  2) 3D time-of-flight head MRA without intravenous contrast.  3) Neck MRA without and with IV contrast. Stenosis measurements made according to NASCET criteria unless otherwise specified.    FINDINGS:  HEAD MRI:  Somewhat motion degraded examination.    INTRACRANIAL CONTENTS: Tiny acute infarct involving the periventricular white matter in the left occipital lobe (image 55 series 5 and image 16 series 6). No mass, acute hemorrhage, or extra-axial fluid collections. Small foci of susceptibility artifact   in the pete, bilateral thalami, left occipital lobe, and inferior left cerebellum likely due to old hemorrhage or mineralization. Confluent nonspecific T2/FLAIR hyperintensities within the cerebral white matter and pete most consistent with advanced   chronic microvascular ischemic change. Moderate generalized cerebral atrophy. No hydrocephalus. Normal position of the cerebellar tonsils. No pathologic contrast enhancement.    SELLA: No abnormality accounting for technique.    OSSEOUS STRUCTURES/SOFT TISSUES: Normal marrow signal. The major intracranial vascular flow voids are maintained.     ORBITS: Prior bilateral cataract surgery. Visualized portions of the orbits are otherwise unremarkable.     SINUSES/MASTOIDS: No paranasal sinus mucosal disease. No middle ear or mastoid effusion.     HEAD MRA:   Somewhat motion degraded examination.    ANTERIOR CIRCULATION: No stenosis/occlusion, aneurysm, or high flow vascular malformation. Fetal origin of the left posterior cerebral artery from the anterior circulation. Prominent right posterior communicating artery is also seen.    POSTERIOR CIRCULATION: No stenosis/occlusion, aneurysm, or high flow vascular malformation. Balanced vertebral  arteries supply a normal basilar artery.     NECK MRA:   RIGHT CAROTID: Atherosclerotic plaque results in less than 50% stenosis in the right ICA. No dissection.    LEFT CAROTID: Atherosclerotic plaque results in less than 50% stenosis in the left ICA. No dissection.    VERTEBRAL ARTERIES: No focal stenosis or dissection. Balanced vertebral arteries.    AORTIC ARCH: Classic aortic arch anatomy. Focal high-grade web or shelflike stenosis of the proximal right subclavian artery just beyond the origin.      Impression    IMPRESSION:  HEAD MRI:   1.  Tiny acute infarct periventricular white matter left occipital lobe.  2.  Generalized brain atrophy and presumed microvascular ischemic changes as detailed above.  3.  Foci of old hemorrhage or mineralization brainstem, thalami, left occipital lobe, and left cerebellum.    HEAD MRA:   1.  No aneurysm, high flow AVM or significant stenosis identified.  2.  Variant [place] of [place] anatomy as above.    NECK MRA:  1.  Focal high-grade web or shelflike stenosis proximal right subclavian artery. A dissection is not excluded. Consider CTA for further evaluation.  2.  Atherosclerotic plaque at both carotid bifurcations without hemodynamically significant stenosis.

## 2024-11-02 NOTE — ED TRIAGE NOTES
Pt BIBA from TCU [facility] after staff noted a pink discoloration to pt's urine in catheter, cath has been in for 2 weeks, pt notes no trauma to area, ABCD intact.       Triage Assessment (Adult)       Row Name 11/01/24 2031          Triage Assessment    Airway WDL WDL        Respiratory WDL    Respiratory WDL WDL        Skin Circulation/Temperature WDL    Skin Circulation/Temperature WDL WDL        Cardiac WDL    Cardiac WDL WDL        Peripheral/Neurovascular WDL    Peripheral Neurovascular WDL WDL        Cognitive/Neuro/Behavioral WDL    Cognitive/Neuro/Behavioral WDL WDL                      Complex Repair And Rotation Flap Text: The defect edges were debeveled with a #15 scalpel blade.  The primary defect was closed partially with a complex linear closure.  Given the location of the remaining defect, shape of the defect and the proximity to free margins a rotation flap was deemed most appropriate for complete closure of the defect.  Using a sterile surgical marker, an appropriate advancement flap was drawn incorporating the defect and placing the expected incisions within the relaxed skin tension lines where possible.    The area thus outlined was incised deep to adipose tissue with a #15 scalpel blade.  The skin margins were undermined to an appropriate distance in all directions utilizing iris scissors.

## 2024-11-02 NOTE — PROGRESS NOTES
Transfer in/out    Transfer to station 73 from [id number]  Report given to receiving RN on unit    Brief note about patient status upon transfer.   Patient is A/O x4, condition stable, see vitals. Sending patient down for CTA head, neck and then patient will be taken up to transfer unit.      Code status: DNR / DNI  Skin: mild scattered scabs, L hip incision KIM, open area on sacrum, redness on perineum  Fall Risk: Yes- Department fall risk interventions implemented.  Isolation: None  Patient belongings: None   If patient is a 72 hour hold/Commitment are belongings removed from room and locked up? NA  Medication drips upon transfer: none  Sign and held orders released? Yes

## 2024-11-02 NOTE — PROGRESS NOTES
"Speech Language Pathology- Clinical swallow evaluation       11/02/24 0979   Appointment Info   Signing Clinician's Name / Credentials (SLP) Arielle Jordan MS CCC SLP   General Information   Onset of Illness/Injury or Date of Surgery 11/01/24   Referring Physician Anton Moreno MD   Patient/Family Therapy Goal Statement (SLP) Patient is thirsty   Pertinent History of Current Problem Per chart \"The patient is a 78 year old male with past medical history of coronary artery disease, CABG in 2009, heart failure and CKD, the patient is on Entresto for heart failure, presented with hematuria and hypotension.  Because of slurred speech the patient had an MRI of the brain which showed small acute ischemic stroke in the left occipital/temporal area near the hippocampus.\"  SLP consulted per stroke protocol.   General Observations Pt is alert/pleasant/cooperative   Type of Evaluation   Type of Evaluation Swallow Evaluation   Oral Motor   Oral Musculature generally intact   Dentition (Oral Motor)   Dentition (Oral Motor) adequate dentition   Facial Symmetry (Oral Motor)   Facial Symmetry (Oral Motor) WNL   Lip Function (Oral Motor)   Lip Range of Motion (Oral Motor) WNL   Lip Strength (Oral Motor) WFL   Tongue Function (Oral Motor)   Tongue Strength (Oral Motor) WFL   Tongue Coordination/Speed (Oral Motor) reduced rate   Tongue ROM (Oral Motor) WNL   Cough/Swallow/Gag Reflex (Oral Motor)   Volitional Swallow (Oral Motor) WNL   Vocal Quality/Secretion Management (Oral Motor)   Vocal Quality (Oral Motor) WFL   Secretion Management (Oral Motor) WNL   General Swallowing Observations   Past History of Dysphagia No h/o dysphagia per EHR. Pt has been followed by SLP at OSH to address dysarthria and cognition.   Respiratory Support room air   Current Diet/Method of Nutritional Intake (General Swallowing Observations, NIS) thin liquids (level 0);regular diet  (diet order for regular solids/thin liquids, though RN has maintained " NPO pending SLP eval.)   Swallowing Evaluation Clinical swallow evaluation   Clinical Swallow Evaluation   Feeding Assistance set up only required   Clinical Swallow Evaluation Textures Trialed thin liquids;pureed;solid foods   Clinical Swallow Eval: Thin Liquid Texture Trial   Mode of Presentation, Thin Liquids cup;self-fed   Volume of Liquid or Food Presented 8 oz   Oral Phase of Swallow WFL   Pharyngeal Phase of Swallow   (single episode of coughing noted with large bolus/continuous drinking. No overt s/s aspiraiton noted with single sips, though pt needed cues due to some impulsivity.)   Clinical Swallow Evaluation: Puree Solid Texture Trial   Mode of Presentation, Puree spoon;fed by clinician   Volume of Puree Presented 2 oz   Oral Phase, Puree WFL   Pharyngeal Phase, Puree   (no overt s/s aspiration)   Clinical Swallow Evaluation: Solid Food Texture Trial   Mode of Presentation self-fed   Volume Presented cracker   Oral Phase delayed AP movement  (prolonged oral phase, mild impulsivity with po intake)   Pharyngeal Phase   (no overt s/s aspiration)   Esophageal Phase of Swallow   Patient reports or presents with symptoms of esophageal dysphagia No   Swallowing Recommendations   Diet Consistency Recommendations thin liquids (level 0);soft & bite-sized (level 6)   Supervision Level for Intake 1:1 supervision needed   Mode of Delivery Recommendations bolus size, small;slow rate of intake   Swallowing Maneuver Recommendations alternate food and liquid intake   Monitoring/Assistance Required (Eating/Swallowing) monitor for cough or change in vocal quality with intake   Recommended Feeding/Eating Techniques (Swallow Eval) maintain upright sitting position for eating   Medication Administration Recommendations, Swallowing (SLP) per pt preference. Serve pills one a time.   Instrumental Assessment Recommendations instrumental evaluation not recommended at this time   General Therapy Interventions   Planned Therapy  Interventions Dysphagia Treatment   Dysphagia treatment Modified diet education;Instruction of safe swallow strategies   Clinical Impression   Criteria for Skilled Therapeutic Interventions Met (SLP Eval) Yes, treatment indicated   SLP Diagnosis mild oropharyngeal dysphagia   Risks & Benefits of therapy have been explained evaluation/treatment results reviewed;patient;participants included;participants voiced agreement with care plan   Clinical Impression Comments Clinical swallow evaluation completed. Oral mech exam generally unremarkable. Pt presents with mild oropharyngeal dysphagia characterized by prolonged oral phase and suspected intermittent premature pharyngeal entry of thin liquids. Recommend Soft/bite sized solids and thin liquids. Direct supervision during meals due to mild impulsivity. Small sips. Upright position for meals. Alternate liquids/solids. SLP will follow.   SLP Total Evaluation Time   Eval: oral/pharyngeal swallow function, clinical swallow Minutes (09585) 20   SLP Goals   Therapy Frequency (SLP Eval) 5 times/week   SLP Goals Swallow   SLP: Safely tolerate diet without signs/symptoms of aspiration Regular diet;Thin liquids;With use of swallow precautions;Independently   Interventions   Interventions Quick Adds Swallowing Dysfunction   Swallowing Intervention   Treatment of Swallowing Dysfunction &/or Oral Function for Feeding Minutes (86335) 8   Treatment Detail/Skilled Intervention Clinician provided education in rationale for modifed diet and recommended swallow strategies; Direct supervision during meals due to mild impulsivity. Small sips. Upright position for meals. Alternate liquids/solids. Pt verbalized understanding.   SLP Discharge Planning   SLP Plan diet tolerance, adat, strategy training. ? need for motor speech eval/tx.   SLP Discharge Recommendation Transitional Care Facility  (back to prior TCU)   SLP Rationale for DC Rec Suspect swallow goals will be met during  hospitalization.   SLP Brief overview of current status  Recommend Soft/bite sized solids and thin liquids. Direct supervision during meals due to mild impulsivity. Small sips. Upright position for meals. Alternate liquids/solids. SLP will follow.   SLP Time and Intention   Total Session Time (sum of timed and untimed services) 28

## 2024-11-02 NOTE — ED PROVIDER NOTES
ED APC SUPERVISION NOTE:   I evaluated this patient in conjunction with Marilia Melendez PA-C  I have participated in the care of the patient and personally performed key elements of the history, exam, and medical decision making.      HPI:   Chris Bhakta is a 78 year old male who presents with hypotension and hematuria.  The patient was hospitalized about a month ago for a fall with a hip fracture that was treated with placement.  He was discharged to the TCU where he still resides.  Staff there noted a red coloration to his urine and he had blood pressures today that were 70s over 50s.  Further history is provided by the patient's daughter.  States the patient is not currently on anticoagulation, but thinks that he may have been in the immediate postoperative period.  States that he has a history of heart failure.  Also states the patient has had slurred speech for about a week.    Independent Historian:   Daughter as detailed above.    Review of External Notes: I reviewed the patient's medication list from the TCU documentation, no blood thinners currently.  Progress note from yesterday reviewed.  Status post left TKA.  Has had some noted blood pressure readings in the 90s over 50s.  Has a Diaz catheter in place due to urinary retention, has been having some constipation that seems to be improving with last bowel movement on 10/30/2024.     EXAM:   General: Laying on the ED bed  HEENT: Normocephalic, atraumatic, mucous membranes dry  Cardiac: Warm and well perfused, regular rate and rhythm, right radial 2+, left hand warm and well-perfused, left radial forearm with a surgical scar from prior CABG.  Pulm: Breathing comfortably, no accessory muscle usage, no conversational dyspnea, and lungs clear bilaterally  GI: Abdomen soft, nontender, no rigidity or guarding  MSK: No bony deformities  Skin: Warm and dry  Neuro: Moves all extremities, PERRL, sensory intact distal extremities x 4  Psych: Pleasant mood and  affect      Independent Interpretation (X-rays, CTs, rhythm strip):  CT Head: No intracranial hemorrhage.    Consultations/Discussion of Management or Tests:  None     MEDICAL DECISION MAKING/ASSESSMENT AND PLAN:   78-year-old male presents from the TCU with hematuria and hypertension.  He is not anticoagulated.  Vital signs here are significant for hypotension, albeit improved from prior to arrival.  There is blood-tinged urine in the Diaz catheter bag on my initial evaluation.  The patient does have slurred speech, unclear at this point whether it is from dry mouth/dehydration or any neurologic finding.  It has been present for a week.  There are no other lateralizing findings on examination.  CT of the head is ordered and shows no ICH.  Lab work is consistent with UTI, mild EBONI.  Chest x-ray was obtained due to the patient's hypotension in the relatively recent postoperative setting and shows no signs of pneumonia.  Blood pressure is improving gradually with IV fluids.  With the hypotension today and overall clinical picture above, plan is for admission to the hospitalist service for further care     DIAGNOSIS:     ICD-10-CM    1. Hematuria  R31.9       2. Hypotension, unspecified hypotension type  I95.9       3. EBONI (acute kidney injury) (H)  N17.9       4. UTI (urinary tract infection)  N39.0       5. Slurred speech  R47.81             JIE VAIL MD  11/1/2024  [facility] EMERGENCY DEPT       Jie Vail MD  11/01/24 2222

## 2024-11-02 NOTE — PROGRESS NOTES
[facility]    Medicine Progress Note - Hospitalist Service    Date of Admission:  11/1/2024    Assessment & Plan      Chris Bhakta is a 78 year old male admitted on 11/1/2024. He presents with hematuria and hypotension    Hematuria  UTI  Hypotension, likely hypovolemic  Urinary retention s/p willis placement  Pt recently underwent L YELENA for DJD and discharged 10/3 to TCU. Had urinary retention and willis placed. Today noted to have hematuria, also noted hypotension, BP at care facility 77/44 (87/53 in ED initially). WBC 8.5. UA w/ 182 RBC, WBCs, WBC clumps. CXR w/o acute process.   * BPs improved with volume resuscitation. Hematuria clearing   * Urine cultures still pending.    - Ceftriaxone 1g IV q24  - Continue IV fluids  - tamsulosin 0.4 mg daily  - Will obtain CT a/p w/o contrast for further evaluation of hematuria and EBONI    Acute CVA  Hx traumatic SAH  Dtr noted pt had slurred speech this week at TCU. Noted to have very dry MM in the ED. CT head in ED unremarkable. MRI w/ tiny acute infarct L occipital lobe, MRA w/ stenosis R subclavian, dissection not excluded.   *d/w neurology, start low dose ASA for now given hematuria  - telemetry  - q4 neuro checks  - ASA 81 mg daily for now  - neurology consult  - CTA head and neck (listed contrast allergy but has received multiple times w/o pretreatment in recent past), premedication ordered per radiology   - A1c, lipids, troponins  - echo  - PT/OT/SLP    EBONI, mild  Mild anion gap acidosis  Baseline creatinine 0.8-0.9. Creatinine in the ED 1.36. lactic 2.0.   * Cr only marginally improved to 1.33 after fluids   - Continue IV fluids  - monitor  - repeat panel in am given acidosis    Physical deconditioning  Failure to Thrive   Passive suicidal ideation   Poor PO intake  Pts daughter reports that pt has not been eating or drinking well at TCU. He will often make some passive suicidal ideations stating that he just wants to die and has  nothing to live for. No specific plan.   - Will consult psychiatry for Monday   - Overall, suicide risk is low, thoughts are just passive with no active plan of self harm   - PT/OT   - Likely discharge to TCU when medically stable.     Chronic HFpEF  CAD s/p CABG 2009, PCI  HTN  HLD  [Needs rec- coreg 3.125 mg BID, Jardiance 10 mg daily, entresto BID]  Recent echo 10/2024 EF 55-60% w/o WMA.   - hold antihypertensives for permissive hypertension    DM II  Recent A1c 10/2/24 6.2%.   - hold metformin and Jardiance for now   - BID and HS blood sugars    L hip DJD s/p YELENA  Chronic pain syndrome  On chronic narcotics, appears related to cervical spine disease. 10/1 s/p YELENA. Had postoperative hypotension requiring ICU and pressors.   - PT  - TID acetaminophen  - dilaudid 2-4 mg q4 prn  - duloxetine 30 mg BID          Diet: Soft & Bite Sized Diet (level 6) Thin Liquids (level 0) (Direct supervision during meals due to mild impulsivity. Small sips. Upright position for meals. Alternate liquids/solids.)    DVT Prophylaxis: Pneumatic Compression Devices  Diaz Catheter: PRESENT, indication: Acute retention or obstruction  Lines: None     Cardiac Monitoring: ACTIVE order. Indication: Stroke, acute (48 hours)  Code Status: No CPR- Do NOT Intubate      Clinically Significant Risk Factors Present on Admission         # Hyponatremia: Lowest Na = 132 mmol/L in last 2 days, will monitor as appropriate  # Hypochloremia: Lowest Cl = 97 mmol/L in last 2 days, will monitor as appropriate        # Drug Induced Platelet Defect: home medication list includes an antiplatelet medication    # Heart failure with preserved ejection fraction: EF >50% and home medication list includes sacubitril/valsartan (Entresto)                   Disposition Plan     Medically Ready for Discharge: Anticipated in 2-4 Days      Dinora Galaviz MD  Hospitalist Service  [facility]  Securely message with [employer] (more info)  Text page  via [facility] Paging/Directory   ______________________________________________________________________    Interval History   NAEO. Pt reports not doing well today. Cannot give specific symptoms. Whole body hurts. Also expressive some passive suicidal ideation, stating that he doesn't have any reason to live.     Physical Exam   Vital Signs: Temp: 97.7  F (36.5  C) Temp src: Axillary BP: 129/70 Pulse: 82   Resp: 18 SpO2: 98 % O2 Device: None (Room air)    Weight: 166 lbs 7.16 oz    Constitutional: Awake, alert, cooperative, no apparent distress.  Eyes: Conjunctiva and pupils examined and normal.  HEENT: Moist mucous membranes, normal dentition.  Respiratory: Clear to auscultation bilaterally, no crackles or wheezing.  Cardiovascular: Regular rate and rhythm, normal S1 and S2, and no murmur noted.  GI: Soft, non-distended, non-tender, normal bowel sounds.  Skin: No rashes, no cyanosis, no edema.  Musculoskeletal: No joint swelling, erythema or tenderness.  Neurologic: Cranial nerves 2-12 intact, normal strength and sensation.  Psychiatric: Alert, oriented to person, place and time, no obvious anxiety or depression.    Medical Decision Making       45 MINUTES SPENT BY ME on the date of service doing chart review, history, exam, documentation & further activities per the note.      Data     I have personally reviewed the following data over the past 24 hrs:    8.5  \   11.9 (L)   / 180     136 102 40.5 (H) /  183 (H)   4.0 19 (L) 1.33 (H) \     Trop: 50 (H) BNP: N/A     Procal: N/A CRP: N/A Lactic Acid: 2.0       INR:  1.13 PTT:  34   D-dimer:  N/A Fibrinogen:  N/A       Imaging results reviewed over the past 24 hrs:   Recent Results (from the past 24 hours)   CT Head w/o Contrast    Narrative    EXAM: CT HEAD W/O CONTRAST  LOCATION: [facility]  DATE: 11/1/2024    INDICATION: Slurred speech for one week  COMPARISON: None.  TECHNIQUE: Routine CT Head without IV contrast. Multiplanar reformats. Dose  reduction techniques were used.    FINDINGS:  INTRACRANIAL CONTENTS: No intracranial hemorrhage, extraaxial collection, or mass effect.  No CT evidence of acute infarct. Moderate to severe presumed chronic small vessel ischemic changes. Moderate generalized volume loss. No hydrocephalus.     VISUALIZED ORBITS/SINUSES/MASTOIDS: No intraorbital abnormality. No paranasal sinus mucosal disease. No middle ear or mastoid effusion.    BONES/SOFT TISSUES: No acute abnormality.      Impression    IMPRESSION:  1.  No CT evidence for acute intracranial process.  2.  Brain atrophy and presumed chronic microvascular ischemic changes as above.   XR Chest 2 Views    Narrative    EXAM: XR CHEST 2 VIEWS  LOCATION: [facility]  DATE: 11/1/2024    INDICATION: Hypotension.  COMPARISON: 9/26/2016.      Impression    IMPRESSION: Sternotomy. Normal heart size and pulmonary vascularity. No acute infiltrates or consolidation. Aortic calcification. Old right posterior rib fractures. Fusion hardware at the cervicothoracic junction. Remainder unremarkable. No definite   acute findings.   MR Brain w/o & w Contrast    Addendum: 11/2/2024    COMMUNICATION ADDENDUM:  Findings were discussed with Dr. Moreno on 11/2/2024 12:54 AM CDT.    END ADDENDUM      Narrative    EXAM: MR BRAIN W/O and W CONTRAST, MRA BRAIN ([place]) W/O CONTRAST, MRA NECK (CAROTIDS) W/O and W CONTRAST  LOCATION: [facility]  DATE: 11/2/2024    INDICATION: slurred speech for 1 week  COMPARISON: Head CT obtained earlier today  CONTRAST: 10mL Gadavist  TECHNIQUE:   1) Routine multiplanar multisequence head MRI without and with intravenous contrast.  2) 3D time-of-flight head MRA without intravenous contrast.  3) Neck MRA without and with IV contrast. Stenosis measurements made according to NASCET criteria unless otherwise specified.    FINDINGS:  HEAD MRI:  Somewhat motion degraded examination.    INTRACRANIAL  CONTENTS: Tiny acute infarct involving the periventricular white matter in the left occipital lobe (image 55 series 5 and image 16 series 6). No mass, acute hemorrhage, or extra-axial fluid collections. Small foci of susceptibility artifact   in the pete, bilateral thalami, left occipital lobe, and inferior left cerebellum likely due to old hemorrhage or mineralization. Confluent nonspecific T2/FLAIR hyperintensities within the cerebral white matter and pete most consistent with advanced   chronic microvascular ischemic change. Moderate generalized cerebral atrophy. No hydrocephalus. Normal position of the cerebellar tonsils. No pathologic contrast enhancement.    SELLA: No abnormality accounting for technique.    OSSEOUS STRUCTURES/SOFT TISSUES: Normal marrow signal. The major intracranial vascular flow voids are maintained.     ORBITS: Prior bilateral cataract surgery. Visualized portions of the orbits are otherwise unremarkable.     SINUSES/MASTOIDS: No paranasal sinus mucosal disease. No middle ear or mastoid effusion.     HEAD MRA:   Somewhat motion degraded examination.    ANTERIOR CIRCULATION: No stenosis/occlusion, aneurysm, or high flow vascular malformation. Fetal origin of the left posterior cerebral artery from the anterior circulation. Prominent right posterior communicating artery is also seen.    POSTERIOR CIRCULATION: No stenosis/occlusion, aneurysm, or high flow vascular malformation. Balanced vertebral arteries supply a normal basilar artery.     NECK MRA:   RIGHT CAROTID: Atherosclerotic plaque results in less than 50% stenosis in the right ICA. No dissection.    LEFT CAROTID: Atherosclerotic plaque results in less than 50% stenosis in the left ICA. No dissection.    VERTEBRAL ARTERIES: No focal stenosis or dissection. Balanced vertebral arteries.    AORTIC ARCH: Classic aortic arch anatomy. Focal high-grade web or shelflike stenosis of the proximal right subclavian artery just beyond the  origin.      Impression    IMPRESSION:  HEAD MRI:   1.  Tiny acute infarct periventricular white matter left occipital lobe.  2.  Generalized brain atrophy and presumed microvascular ischemic changes as detailed above.  3.  Foci of old hemorrhage or mineralization brainstem, thalami, left occipital lobe, and left cerebellum.    HEAD MRA:   1.  No aneurysm, high flow AVM or significant stenosis identified.  2.  Variant [place] of [place] anatomy as above.    NECK MRA:  1.  Focal high-grade web or shelflike stenosis proximal right subclavian artery. A dissection is not excluded. Consider CTA for further evaluation.  2.  Atherosclerotic plaque at both carotid bifurcations without hemodynamically significant stenosis.   MRA Neck (Carotids) wo & w Contrast    Addendum: 11/2/2024    COMMUNICATION ADDENDUM:  Findings were discussed with Dr. Moreno on 11/2/2024 12:54 AM CDT.    END ADDENDUM      Narrative    EXAM: MR BRAIN W/O and W CONTRAST, MRA BRAIN ([place]) W/O CONTRAST, MRA NECK (CAROTIDS) W/O and W CONTRAST  LOCATION: [facility]  DATE: 11/2/2024    INDICATION: slurred speech for 1 week  COMPARISON: Head CT obtained earlier today  CONTRAST: 10mL Gadavist  TECHNIQUE:   1) Routine multiplanar multisequence head MRI without and with intravenous contrast.  2) 3D time-of-flight head MRA without intravenous contrast.  3) Neck MRA without and with IV contrast. Stenosis measurements made according to NASCET criteria unless otherwise specified.    FINDINGS:  HEAD MRI:  Somewhat motion degraded examination.    INTRACRANIAL CONTENTS: Tiny acute infarct involving the periventricular white matter in the left occipital lobe (image 55 series 5 and image 16 series 6). No mass, acute hemorrhage, or extra-axial fluid collections. Small foci of susceptibility artifact   in the pete, bilateral thalami, left occipital lobe, and inferior left cerebellum likely due to old hemorrhage or mineralization.  Confluent nonspecific T2/FLAIR hyperintensities within the cerebral white matter and pete most consistent with advanced   chronic microvascular ischemic change. Moderate generalized cerebral atrophy. No hydrocephalus. Normal position of the cerebellar tonsils. No pathologic contrast enhancement.    SELLA: No abnormality accounting for technique.    OSSEOUS STRUCTURES/SOFT TISSUES: Normal marrow signal. The major intracranial vascular flow voids are maintained.     ORBITS: Prior bilateral cataract surgery. Visualized portions of the orbits are otherwise unremarkable.     SINUSES/MASTOIDS: No paranasal sinus mucosal disease. No middle ear or mastoid effusion.     HEAD MRA:   Somewhat motion degraded examination.    ANTERIOR CIRCULATION: No stenosis/occlusion, aneurysm, or high flow vascular malformation. Fetal origin of the left posterior cerebral artery from the anterior circulation. Prominent right posterior communicating artery is also seen.    POSTERIOR CIRCULATION: No stenosis/occlusion, aneurysm, or high flow vascular malformation. Balanced vertebral arteries supply a normal basilar artery.     NECK MRA:   RIGHT CAROTID: Atherosclerotic plaque results in less than 50% stenosis in the right ICA. No dissection.    LEFT CAROTID: Atherosclerotic plaque results in less than 50% stenosis in the left ICA. No dissection.    VERTEBRAL ARTERIES: No focal stenosis or dissection. Balanced vertebral arteries.    AORTIC ARCH: Classic aortic arch anatomy. Focal high-grade web or shelflike stenosis of the proximal right subclavian artery just beyond the origin.      Impression    IMPRESSION:  HEAD MRI:   1.  Tiny acute infarct periventricular white matter left occipital lobe.  2.  Generalized brain atrophy and presumed microvascular ischemic changes as detailed above.  3.  Foci of old hemorrhage or mineralization brainstem, thalami, left occipital lobe, and left cerebellum.    HEAD MRA:   1.  No aneurysm, high flow AVM or  significant stenosis identified.  2.  Variant [place] of [place] anatomy as above.    NECK MRA:  1.  Focal high-grade web or shelflike stenosis proximal right subclavian artery. A dissection is not excluded. Consider CTA for further evaluation.  2.  Atherosclerotic plaque at both carotid bifurcations without hemodynamically significant stenosis.   MRA Brain ([place] of [place]) wo Contrast    Addendum: 11/2/2024    COMMUNICATION ADDENDUM:  Findings were discussed with Dr. Moreno on 11/2/2024 12:54 AM CDT.    END ADDENDUM      Narrative    EXAM: MR BRAIN W/O and W CONTRAST, MRA BRAIN ([place]) W/O CONTRAST, MRA NECK (CAROTIDS) W/O and W CONTRAST  LOCATION: [facility]  DATE: 11/2/2024    INDICATION: slurred speech for 1 week  COMPARISON: Head CT obtained earlier today  CONTRAST: 10mL Gadavist  TECHNIQUE:   1) Routine multiplanar multisequence head MRI without and with intravenous contrast.  2) 3D time-of-flight head MRA without intravenous contrast.  3) Neck MRA without and with IV contrast. Stenosis measurements made according to NASCET criteria unless otherwise specified.    FINDINGS:  HEAD MRI:  Somewhat motion degraded examination.    INTRACRANIAL CONTENTS: Tiny acute infarct involving the periventricular white matter in the left occipital lobe (image 55 series 5 and image 16 series 6). No mass, acute hemorrhage, or extra-axial fluid collections. Small foci of susceptibility artifact   in the pete, bilateral thalami, left occipital lobe, and inferior left cerebellum likely due to old hemorrhage or mineralization. Confluent nonspecific T2/FLAIR hyperintensities within the cerebral white matter and pete most consistent with advanced   chronic microvascular ischemic change. Moderate generalized cerebral atrophy. No hydrocephalus. Normal position of the cerebellar tonsils. No pathologic contrast enhancement.    SELLA: No abnormality accounting for technique.    OSSEOUS STRUCTURES/SOFT  TISSUES: Normal marrow signal. The major intracranial vascular flow voids are maintained.     ORBITS: Prior bilateral cataract surgery. Visualized portions of the orbits are otherwise unremarkable.     SINUSES/MASTOIDS: No paranasal sinus mucosal disease. No middle ear or mastoid effusion.     HEAD MRA:   Somewhat motion degraded examination.    ANTERIOR CIRCULATION: No stenosis/occlusion, aneurysm, or high flow vascular malformation. Fetal origin of the left posterior cerebral artery from the anterior circulation. Prominent right posterior communicating artery is also seen.    POSTERIOR CIRCULATION: No stenosis/occlusion, aneurysm, or high flow vascular malformation. Balanced vertebral arteries supply a normal basilar artery.     NECK MRA:   RIGHT CAROTID: Atherosclerotic plaque results in less than 50% stenosis in the right ICA. No dissection.    LEFT CAROTID: Atherosclerotic plaque results in less than 50% stenosis in the left ICA. No dissection.    VERTEBRAL ARTERIES: No focal stenosis or dissection. Balanced vertebral arteries.    AORTIC ARCH: Classic aortic arch anatomy. Focal high-grade web or shelflike stenosis of the proximal right subclavian artery just beyond the origin.      Impression    IMPRESSION:  HEAD MRI:   1.  Tiny acute infarct periventricular white matter left occipital lobe.  2.  Generalized brain atrophy and presumed microvascular ischemic changes as detailed above.  3.  Foci of old hemorrhage or mineralization brainstem, thalami, left occipital lobe, and left cerebellum.    HEAD MRA:   1.  No aneurysm, high flow AVM or significant stenosis identified.  2.  Variant [place] of [place] anatomy as above.    NECK MRA:  1.  Focal high-grade web or shelflike stenosis proximal right subclavian artery. A dissection is not excluded. Consider CTA for further evaluation.  2.  Atherosclerotic plaque at both carotid bifurcations without hemodynamically significant stenosis.

## 2024-11-02 NOTE — PROGRESS NOTES
Reason for Admission: Acute ischemic stroke L occipital Region, hematuria, hypotension     Cognitive/Mentation: A/Ox 3  Neuros/CMS: Intact ex disoriented to situation, forgetful, intermittent confusion, slurred speech, gen weakness    VS: stable on RA .   Tele: sr.  /GI: Incon- willis was in place was pulled dt leaking, to be replaced .   Pulmonary: LS clear .  Pain: HA, scheduled tylenol given .     Drains/Lines: PIV infusing ns 100ml/hr   Skin: scattered sabs, bruising and L hip incision cdi   Activity: Assist x 2 with ss.  Diet: Soft and bite sized with thin liquids. Takes pills whole one at a time .     Therapies recs: Pending  Discharge: Pending     Aggression Stoplight Tool: Green

## 2024-11-02 NOTE — PROGRESS NOTES
RECEIVING UNIT ED HANDOFF REVIEW    ED Nurse Handoff Report was reviewed by: Milo Evans RN on November 2, 2024 at 12:02 AM

## 2024-11-02 NOTE — PROGRESS NOTES
Admission    Patient arrives to room 624 via cart from ED.  Care plan note: will follow    Inpatient nursing criteria listed below were met:    Did you put disposition on whiteboard and in sticky note: Yes  Full skin assessment done (add LDA if skin issue present). Initials of 2nd RN :Yes  Isolation education started/completed NA  Patient allergies verified with patient: Yes  Fall Risk? (Care plan updated, Education given and documented) Yes  Primary Care Plan initiated: Yes  Home medications documented in belongings flowsheet: NA  Patient belongings documented in belongings flowsheet: NA, no belongings on admission  Reminder note (belongings/ medications) placed in discharge instructions:NA  Admission profile/ required documentation complete: Yes  If patient is a 72 hour hold/Commitment are belongings removed from room and locked up? NA

## 2024-11-02 NOTE — ED NOTES
Bed: ED09  Expected date: 11/1/24  Expected time: 8:25 PM  Means of arrival: Ambulance  Comments:  Karyn 525 78M blood in urine

## 2024-11-02 NOTE — CONSULTS
Care Management Initial Consult    General Information  Assessment completed with: Patient, Family,    Type of CM/SW Visit: Offer D/C Planning    Primary Care Provider verified and updated as needed:     Readmission within the last 30 days: current reason for admission unrelated to previous admission      Reason for Consult: discharge planning  Advance Care Planning:     Health Care Directive On file.        Communication Assessment  Patient's communication style: spoken language (English or Bilingual)    Hearing Difficulty or Deaf: no   Wear Glasses or Blind: yes (not present with patient)    Cognitive  Cognitive/Neuro/Behavioral: .WDL except, orientation  Level of Consciousness: intermittent confusion, alert  Arousal Level: opens eyes spontaneously  Orientation: disoriented to, place  Mood/Behavior: cooperative  Best Language: 0 - No aphasia  Speech: garbled, slurred, logical    Living Environment:   People in home: alone, facility resident     Current living Arrangements: assisted living  Name of Facility: [facility]   Able to return to prior arrangements: yes  Living Arrangement Comments: Back to [facility]    Family/Social Support:  Care provided by: self, child(sendy), homecare agency  Provides care for: no one     Support system: Children, Facility resident(s)/Staff          Description of Support System: Supportive, Involved    Support Assessment: Adequate family and caregiver support, Adequate social supports    Current Resources:   Patient receiving home care services:  Not currently, patient will return back to [facility].         Community Resources:  [facility]  Equipment currently used at home: wheelchair, manual  Supplies currently used at home:      Employment/Financial:  Employment Status: retired        Financial Concerns:   None indicated          Does the patient's insurance plan have a 3 day qualifying hospital stay waiver?  No    Lifestyle & Psychosocial  Needs:  Social Drivers of Health     Food Insecurity: Low Risk  (10/5/2024)    Food Insecurity     Within the past 12 months, did you worry that your food would run out before you got money to buy more?: No     Within the past 12 months, did the food you bought just not last and you didn t have money to get more?: No   Depression: Not at risk (3/19/2021)    Received from [facility]    PHQ-2     PHQ-2 Score: 2   Housing Stability: Low Risk  (10/5/2024)    Housing Stability     Do you have housing? : Yes     Are you worried about losing your housing?: No   Tobacco Use: Medium Risk (10/31/2024)    Patient History     Smoking Tobacco Use: Former     Smokeless Tobacco Use: Never     Passive Exposure: Not on file   Financial Resource Strain: Low Risk  (10/5/2024)    Financial Resource Strain     Within the past 12 months, have you or your family members you live with been unable to get utilities (heat, electricity) when it was really needed?: No   Alcohol Use: Not on file   Transportation Needs: Low Risk  (10/5/2024)    Transportation Needs     Within the past 12 months, has lack of transportation kept you from medical appointments, getting your medicines, non-medical meetings or appointments, work, or from getting things that you need?: No   Physical Activity: Not on file   Interpersonal Safety: High Risk (11/2/2024)    Interpersonal Safety     Do you feel physically and emotionally safe where you currently live?: No     Within the past 12 months, have you been hit, slapped, kicked or otherwise physically hurt by someone?: No     Within the past 12 months, have you been humiliated or emotionally abused in other ways by your partner or ex-partner?: No   Stress: Not on file   Social Connections: Not on file   Health Literacy: Not on file       Functional Status:  Prior to admission patient needed assistance:    Will return back to [facility].           Mental Health Status:      None indicated    Chemical  Dependency Status:      None indicated          Values/Beliefs:  Spiritual, Cultural Beliefs, Mormonism Practices, Values that affect care:                 Discussed  Partnership in Safe Discharge Planning  document with patient/family: No    Additional Information:   consulted per care management/social work consult for discharge planning and TCU placement on discharge.  Patient is an 78 year old male that was admitted on 11/1/2024 with a primary diagnosis of CVA.  The tentative date of discharge is 11/3/2024.   reviewed chart and spoke with patient/family to discuss discharge plans.  Per patient's/family's report, patient lives alone at [facility] where he receives the highest level of care.  reviewed the therapy discharge recommendations of TCU placement on discharge and patient/family is in agreement.  Patient has a bed hold at [facility].  placed call to [facility] and spoke with Tianna regarding patient's return back to their facility on 11/3. Tianna stated that they would be able to accept patient back tomorrow and requested that  call weekend phone number: [phone] and fax discharge orders to [phone].   met with patient and he is in agreement with return back to facility. Patient is requesting wheelchair ride be arranged for transportation.  confirmed with bedside nurse, Carlyn, that patient would be able to safely transport via wheelchair transport. Patient has [facility] and therefore his insurance should cover the cost of the transport.  placed call to [facility] to arrange for a wheelchair ride with a  time of 1:10 - 1:50.  updated [facility] of discharge plan.  updated patient's daughter Psuhpa per patient's request. Daughter requesting update from physician and  messaged physician to update.       Next Steps:   - Continue to  follow for discharge back to [facility].     JOAN Benavides, [facility]  Inpatient Care Coordination  Social Work  [phone]  [facility]

## 2024-11-02 NOTE — PHARMACY-ADMISSION MEDICATION HISTORY
Pharmacist Admission Medication History    Admission medication history is complete. The information provided in this note is only as accurate as the sources available at the time of the update.    Information Source(s): Facility (TCU/NH/) medication list/MAR via  [facility]    Pertinent Information: Of note MAR from facility only went through 10/31.  Medications given on 10/31, unclear if given on 11/1.  No changes were made to the medication list.    Changes made to PTA medication list:  Added: None  Deleted: None  Changed: None    Allergies reviewed with patient and updates made in EHR: unable to assess    Medication History Completed By: Matheus Beck [facility] 11/1/2024 10:44 PM    PTA Med List   Medication Sig Last Dose/Taking    acetaminophen (TYLENOL) 500 MG tablet Take 1,000 mg by mouth 3 times daily. Taking    aspirin (ASA) 81 MG chewable tablet Take 1 tablet (81 mg) by mouth daily. Taking    bisacodyl (DULCOLAX) 10 MG suppository Place 10 mg rectally daily as needed for constipation. Taking As Needed    carvedilol (COREG) 3.125 MG tablet Take 1 tablet (3.125 mg) by mouth 2 times daily (with meals). Taking    DULoxetine (CYMBALTA) 30 MG capsule Take 30 mg by mouth 2 times daily. 0900 & 1900 Taking    empagliflozin (JARDIANCE) 10 MG TABS tablet Take 10 mg by mouth daily. Taking    folic acid (FOLVITE) 1 MG tablet Take 1 mg by mouth daily. Taking    HYDROmorphone (DILAUDID) 2 MG tablet Take 1-2 tablets (2-4 mg) by mouth every 4 hours as needed for moderate pain. Taking As Needed    hydrOXYzine HCl (ATARAX) 10 MG tablet Take 1 tablet (10 mg) by mouth every 6 hours as needed for other (adjuvant pain). Taking As Needed    latanoprost (XALATAN) 0.005 % ophthalmic solution Place 1 drop into both eyes at bedtime. Taking    metFORMIN (GLUCOPHAGE) 500 MG tablet Take 1 tablet (500 mg) by mouth daily (with breakfast). Diabetes mellitus Taking    nystatin (MYCOSTATIN) 343554 UNIT/GM external  powder Apply topically 2 times daily as needed for other (Rash). Taking As Needed    ondansetron (ZOFRAN ODT) 4 MG ODT tab Take 1 tablet (4 mg) by mouth every 6 hours as needed for nausea or vomiting. Taking As Needed    polyethylene glycol (MIRALAX) 17 g packet Take 1 packet by mouth daily as needed for constipation Taking As Needed    sacubitril-valsartan (ENTRESTO) 24-26 MG per tablet Take 1 tablet by mouth 2 times daily. 0900 & 1900  hypertension Taking    senna-docusate (SENOKOT-S/PERICOLACE) 8.6-50 MG tablet Take 2 tablets by mouth 2 times daily. Taking    tamsulosin (FLOMAX) 0.4 MG 24 hr capsule Take 0.4 mg by mouth every morning Taking    trolamine salicylate (ASPERCREME) 10 % external cream Apply topically daily as needed for moderate pain. Taking As Needed    vitamin D3 (CHOLECALCIFEROL) 50 mcg (2000 units) tablet Take 1 tablet by mouth daily. Taking

## 2024-11-02 NOTE — CONSULTS
"  [facility]    Stroke Telephone Note    I was called by  on 11/02/24 regarding patient Chris Bhakta. The patient is a 78 year old male with past medical history of coronary artery disease, CABG in 2009, heart failure and CKD, the patient is on Entresto for heart failure, presented with hematuria and hypotension.  Because of slurred speech the patient had an MRI of the brain which showed small acute ischemic stroke in the left occipital/temporal area near the hippocampus so stroke neurology was involved.    Vitals  BP: 100/60   Pulse: 88   Resp: 18   Temp: 97.7  F (36.5  C)   Weight: 75.5 kg (166 lb 7.2 oz)    Imaging Findings  MRI of the brain with and without contrast: Left occipital/temporal small acute stroke  MR angiography of brain and neck: Unremarkable except for high-grade stenosis of the left subclavian artery    Impression  Acute ischemic stroke of left occipital/temporal area due to undetermined etiology   MRI was initially obtained because of slurred speech which showed stroke, would like to start on aspirin for secondary prevention of stroke, please start aspirin 81 mg if hematuria is controlled.  Unclear time of onset of slurred speech.    Recommendations  - Use orderset: \"Ischemic Stroke Routine Admission\" or \"Ischemic Stroke No Thrombolytics/No Thrombectomy ICU Admission\"  - Place Neurology IP Stroke Consult order   - Neurochecks and Vital Signs every 4 hours   - Daily aspirin 81 mg for secondary stroke prevention  - Statin: Determine after the lipid panel  - TTE   - Telemetry, EKG  - Bedside Glucose Monitoring  - A1c, Lipid Panel, Troponin x 3  - PT/OT/SLP  - Stroke Education  - Euthermia, Euglycemia    My recommendations are based on the information provided over the phone by Chris Bhakta's in-person providers. They are not intended to replace the clinical judgment of his in-person providers. I was not requested to personally see or examine the patient at " "this time.     The Stroke Staff is Dr. Eaton.    Otf Hatch MD  Vascular Neurology Fellow    To page me or covering stroke neurology team member, click here: AMCOM  Choose \"On Call\" tab at top, then select \"NEUROLOGY/ALL SITES\" from middle drop-down box, press Enter, then look for \"stroke\" or \"telestroke\" for your site.    "

## 2024-11-02 NOTE — ED NOTES
[facility]  ED Nurse Handoff Report    ED Chief complaint: Hematuria      ED Diagnosis:   Final diagnoses:   Hematuria   Hypotension, unspecified hypotension type   EBONI (acute kidney injury) (H)   UTI (urinary tract infection)   Slurred speech       Code Status: DNR / DNI    Allergies:   Allergies   Allergen Reactions    Iodinated Contrast Media Shortness Of Breath    Atorvastatin Fatigue    Lisinopril Cough    Oxycodone Confusion    Sertraline Dizziness       Patient Story: Pt BIBA from [facility] after staff noted a pink discoloration to pt's urine in catheter, cath has been in for 2 weeks, pt notes no trauma to area, ABCD intact.    Focused Assessment:  Cardiac-WDL  Neuro-WDL  Resp-WDL    Treatments and/or interventions provided: IV NS bolus, IV abx, catheter change  Patient's response to treatments and/or interventions: tolerated    To be done/followed up on inpatient unit:  monitor    Does this patient have any cognitive concerns?:  A/OX4    Activity level - Baseline/Home:  Total Care  Activity Level - Current:   total care    Patient's Preferred language: English   Needed?: No    Isolation: None  Infection: Not Applicable  Patient tested for COVID 19 prior to admission: NO  Bariatric?: No    Vital Signs:   Vitals:    11/01/24 2045 11/01/24 2100 11/01/24 2115 11/01/24 2130   BP: 92/56 90/53 (!) 87/53 103/50   Pulse: 80 78 69 73   Resp:   11 13   Temp:       TempSrc:       SpO2:  97% 96% 96%   Weight:       Height:           Cardiac Rhythm:     Was the PSS-3 completed:   Yes  What interventions are required if any?               Family Comments: none  OBS brochure/video discussed/provided to patient/family: N/A              Name of person given brochure if not patient: NA              Relationship to patient: NA    For the majority of the shift this patient's behavior was Green.   Behavioral interventions performed were NA.    ED NURSE PHONE NUMBER: [phone]

## 2024-11-03 ENCOUNTER — APPOINTMENT (OUTPATIENT)
Dept: CARDIOLOGY | Facility: CLINIC | Age: 79
DRG: 698 | End: 2024-11-03
Attending: INTERNAL MEDICINE
Payer: COMMERCIAL

## 2024-11-03 ENCOUNTER — APPOINTMENT (OUTPATIENT)
Dept: CT IMAGING | Facility: CLINIC | Age: 79
DRG: 698 | End: 2024-11-03
Attending: STUDENT IN AN ORGANIZED HEALTH CARE EDUCATION/TRAINING PROGRAM
Payer: COMMERCIAL

## 2024-11-03 ENCOUNTER — APPOINTMENT (OUTPATIENT)
Dept: CT IMAGING | Facility: CLINIC | Age: 79
DRG: 698 | End: 2024-11-03
Attending: INTERNAL MEDICINE
Payer: COMMERCIAL

## 2024-11-03 LAB
ANION GAP SERPL CALCULATED.3IONS-SCNC: 15 MMOL/L (ref 7–15)
BACTERIA UR CULT: ABNORMAL
BUN SERPL-MCNC: 32.4 MG/DL (ref 8–23)
CALCIUM SERPL-MCNC: 9.4 MG/DL (ref 8.8–10.4)
CHLORIDE SERPL-SCNC: 104 MMOL/L (ref 98–107)
CREAT SERPL-MCNC: 1.19 MG/DL (ref 0.67–1.17)
EGFRCR SERPLBLD CKD-EPI 2021: 62 ML/MIN/1.73M2
ERYTHROCYTE [DISTWIDTH] IN BLOOD BY AUTOMATED COUNT: 13.2 % (ref 10–15)
GLUCOSE SERPL-MCNC: 240 MG/DL (ref 70–99)
HCO3 SERPL-SCNC: 19 MMOL/L (ref 22–29)
HCT VFR BLD AUTO: 33.6 % (ref 40–53)
HGB BLD-MCNC: 11.4 G/DL (ref 13.3–17.7)
MCH RBC QN AUTO: 33.5 PG (ref 26.5–33)
MCHC RBC AUTO-ENTMCNC: 33.9 G/DL (ref 31.5–36.5)
MCV RBC AUTO: 99 FL (ref 78–100)
PLATELET # BLD AUTO: 196 10E3/UL (ref 150–450)
POTASSIUM SERPL-SCNC: 4.5 MMOL/L (ref 3.4–5.3)
RBC # BLD AUTO: 3.4 10E6/UL (ref 4.4–5.9)
SODIUM SERPL-SCNC: 138 MMOL/L (ref 135–145)
WBC # BLD AUTO: 6.6 10E3/UL (ref 4–11)

## 2024-11-03 PROCEDURE — 999N000208 ECHOCARDIOGRAM COMPLETE

## 2024-11-03 PROCEDURE — 255N000002 HC RX 255 OP 636: Performed by: INTERNAL MEDICINE

## 2024-11-03 PROCEDURE — 70498 CT ANGIOGRAPHY NECK: CPT

## 2024-11-03 PROCEDURE — 74176 CT ABD & PELVIS W/O CONTRAST: CPT

## 2024-11-03 PROCEDURE — 250N000009 HC RX 250: Performed by: INTERNAL MEDICINE

## 2024-11-03 PROCEDURE — 250N000013 HC RX MED GY IP 250 OP 250 PS 637: Performed by: INTERNAL MEDICINE

## 2024-11-03 PROCEDURE — 93306 TTE W/DOPPLER COMPLETE: CPT | Mod: 26 | Performed by: INTERNAL MEDICINE

## 2024-11-03 PROCEDURE — 70496 CT ANGIOGRAPHY HEAD: CPT

## 2024-11-03 PROCEDURE — 250N000013 HC RX MED GY IP 250 OP 250 PS 637: Performed by: PSYCHIATRY & NEUROLOGY

## 2024-11-03 PROCEDURE — 36415 COLL VENOUS BLD VENIPUNCTURE: CPT | Performed by: INTERNAL MEDICINE

## 2024-11-03 PROCEDURE — 80048 BASIC METABOLIC PNL TOTAL CA: CPT | Performed by: INTERNAL MEDICINE

## 2024-11-03 PROCEDURE — 99233 SBSQ HOSP IP/OBS HIGH 50: CPT | Performed by: STUDENT IN AN ORGANIZED HEALTH CARE EDUCATION/TRAINING PROGRAM

## 2024-11-03 PROCEDURE — 99232 SBSQ HOSP IP/OBS MODERATE 35: CPT | Mod: GC | Performed by: STUDENT IN AN ORGANIZED HEALTH CARE EDUCATION/TRAINING PROGRAM

## 2024-11-03 PROCEDURE — 250N000011 HC RX IP 250 OP 636: Performed by: INTERNAL MEDICINE

## 2024-11-03 PROCEDURE — 85027 COMPLETE CBC AUTOMATED: CPT | Performed by: INTERNAL MEDICINE

## 2024-11-03 PROCEDURE — 120N000001 HC R&B MED SURG/OB

## 2024-11-03 PROCEDURE — 258N000003 HC RX IP 258 OP 636: Performed by: INTERNAL MEDICINE

## 2024-11-03 PROCEDURE — 250N000011 HC RX IP 250 OP 636: Performed by: STUDENT IN AN ORGANIZED HEALTH CARE EDUCATION/TRAINING PROGRAM

## 2024-11-03 RX ORDER — METHYLPREDNISOLONE SODIUM SUCCINATE 40 MG/ML
40 INJECTION INTRAMUSCULAR; INTRAVENOUS EVERY 4 HOURS
Status: COMPLETED | OUTPATIENT
Start: 2024-11-03 | End: 2024-11-03

## 2024-11-03 RX ORDER — DIPHENHYDRAMINE HYDROCHLORIDE 50 MG/ML
50 INJECTION INTRAMUSCULAR; INTRAVENOUS ONCE
Status: COMPLETED | OUTPATIENT
Start: 2024-11-03 | End: 2024-11-03

## 2024-11-03 RX ORDER — ATORVASTATIN CALCIUM 40 MG/1
40 TABLET, FILM COATED ORAL EVERY EVENING
Status: DISCONTINUED | OUTPATIENT
Start: 2024-11-03 | End: 2024-11-04 | Stop reason: HOSPADM

## 2024-11-03 RX ORDER — IOPAMIDOL 755 MG/ML
67 INJECTION, SOLUTION INTRAVASCULAR ONCE
Status: COMPLETED | OUTPATIENT
Start: 2024-11-03 | End: 2024-11-03

## 2024-11-03 RX ADMIN — IOPAMIDOL 67 ML: 755 INJECTION, SOLUTION INTRAVENOUS at 09:56

## 2024-11-03 RX ADMIN — SENNOSIDES AND DOCUSATE SODIUM 2 TABLET: 50; 8.6 TABLET ORAL at 20:47

## 2024-11-03 RX ADMIN — TAMSULOSIN HYDROCHLORIDE 0.4 MG: 0.4 CAPSULE ORAL at 08:21

## 2024-11-03 RX ADMIN — LATANOPROST 1 DROP: 50 SOLUTION/ DROPS OPHTHALMIC at 21:12

## 2024-11-03 RX ADMIN — DULOXETINE HYDROCHLORIDE 30 MG: 30 CAPSULE, DELAYED RELEASE ORAL at 20:47

## 2024-11-03 RX ADMIN — METHYLPREDNISOLONE SODIUM SUCCINATE 40 MG: 40 INJECTION, POWDER, FOR SOLUTION INTRAMUSCULAR; INTRAVENOUS at 02:48

## 2024-11-03 RX ADMIN — METHYLPREDNISOLONE SODIUM SUCCINATE 40 MG: 40 INJECTION, POWDER, FOR SOLUTION INTRAMUSCULAR; INTRAVENOUS at 07:17

## 2024-11-03 RX ADMIN — DIPHENHYDRAMINE HYDROCHLORIDE 50 MG: 50 INJECTION INTRAMUSCULAR; INTRAVENOUS at 08:22

## 2024-11-03 RX ADMIN — Medication 50 MCG: at 08:21

## 2024-11-03 RX ADMIN — ACETAMINOPHEN 1000 MG: 500 TABLET, FILM COATED ORAL at 11:54

## 2024-11-03 RX ADMIN — ASPIRIN 81 MG CHEWABLE TABLET 81 MG: 81 TABLET CHEWABLE at 08:21

## 2024-11-03 RX ADMIN — POLYETHYLENE GLYCOL 3350 17 G: 17 POWDER, FOR SOLUTION ORAL at 08:24

## 2024-11-03 RX ADMIN — SENNOSIDES AND DOCUSATE SODIUM 2 TABLET: 50; 8.6 TABLET ORAL at 08:22

## 2024-11-03 RX ADMIN — ATORVASTATIN CALCIUM 40 MG: 40 TABLET, FILM COATED ORAL at 20:47

## 2024-11-03 RX ADMIN — CEFTRIAXONE SODIUM 1 G: 1 INJECTION, POWDER, FOR SOLUTION INTRAMUSCULAR; INTRAVENOUS at 21:08

## 2024-11-03 RX ADMIN — HUMAN ALBUMIN MICROSPHERES AND PERFLUTREN 3 ML: 10; .22 INJECTION, SOLUTION INTRAVENOUS at 11:38

## 2024-11-03 RX ADMIN — ACETAMINOPHEN 1000 MG: 500 TABLET, FILM COATED ORAL at 08:21

## 2024-11-03 RX ADMIN — SODIUM CHLORIDE: 9 INJECTION, SOLUTION INTRAVENOUS at 01:09

## 2024-11-03 RX ADMIN — ACETAMINOPHEN 1000 MG: 500 TABLET, FILM COATED ORAL at 17:57

## 2024-11-03 RX ADMIN — SODIUM CHLORIDE 90 ML: 9 INJECTION, SOLUTION INTRAVENOUS at 09:56

## 2024-11-03 RX ADMIN — DULOXETINE HYDROCHLORIDE 30 MG: 30 CAPSULE, DELAYED RELEASE ORAL at 08:21

## 2024-11-03 RX ADMIN — HYDROMORPHONE HYDROCHLORIDE 2 MG: 2 TABLET ORAL at 22:43

## 2024-11-03 NOTE — PROGRESS NOTES
[facility]    Vascular Neurology Progress Note    Interval History     Reymundo was seen and examined this AM, Neuro exam stable, CTA redemonstrated focal high-grade stenosis in proximal right subclavian artery likely atherosclerotic in nature, complains of neck pain.     Hospital Course     Chief complaint: Hematuria    78 year old male with past medical history significant for traumatic subarachnoid hemorrhage, congestive heart failure, hypertension, hyperlipidemia, diabetes mellitus type 2, coronary disease status post CABG and PCI presents with hypotension and hematuria. found to have UTI, MRI brain a tiny infarct of left occipital lobe, MRA head and neck showed focal high-grade stenosis proximal right subclavian artery.    Assessment and Plan     Acute ischemic stroke of L occipital region due to embolic stroke of undetermined source (ESUS) vs small vessel dz        78 year old male with past medical history significant for traumatic subarachnoid hemorrhage, congestive heart failure, hypertension, hyperlipidemia diabetes mellitus type 2, coronary disease status post CABG and PCI presents with hypotension and hematuria. On admission, BP 90/62, UA showing showing findings concerning for UTI, MRI brain a tiny infarct of left occipital lobe, MRA head and neck showed focal high-grade stenosis proximal right subclavian artery. CTA redemonstrated focal high-grade stenosis in proximal right subclavian artery likely atherosclerotic in nature.  Findings of contralateral subclavian focal stenosis could be an incidental. Etiology of ischemic stroke could be due to ESUS vs small vessel disease, will do further stroke workup and manage as needed.         Recommendations   - Neurochecks and Vital Signs every 4 hrs   - Gradual reduction to a long term goal of <130/80  - Daily aspirin 81 mg for secondary stroke prevention  - Atorvastatin 40 mg qd  - Telemetry, EKG  - Bedside Glucose Monitoring  -  "PT/OT/SLP  - Stroke Education  - Euthermia, Euglycemia           Patient Follow-up    - in 6-8 weeks with general neurology or stroke IMER ([phone])    DVT Prophylaxis: PCD    Patient Follow-up    - in 6-8 weeks with general neurology or stroke IMER ([phone])    No further stroke evaluation is recommended, so we will sign off. Please contact us with any additional questions.      The Stroke Staff is Dr. Chery.    Td Rothman MD  Vascular Neurology Fellow    To page me or covering stroke neurology team member, click here: AMCOM  Choose \"On Call\" tab at top, then select \"NEUROLOGY/ALL SITES\" from middle drop-down box, press Enter, then look for \"stroke\" or \"telestroke\" for your site.    Physical Examination     Temp: 98.5  F (36.9  C) Temp src: Axillary BP: 129/66 Pulse: 85   Resp: 18 SpO2: 97 % O2 Device: None (Room air)      Neurologic  Mental Status:  alert, oriented x 3, follows commands, speech dysarthric   Cranial Nerves:  visual fields intact, PERRL, EOMI with normal smooth pursuit, facial sensation intact and symmetric, mild facial asymmetry, hearing not formally tested but intact to conversation, palate elevation symmetric and uvula midline, no dysarthria, shoulder shrug strong bilaterally, tongue protrusion midline  Motor:  normal muscle tone and bulk, no abnormal movements, able to move all limbs spontaneously, strength 5/5 throughout upper and lower extremities, no pronator drift  Reflexes:  toes down-going  Sensory:  light touch sensation intact and symmetric throughout upper and lower extremities  Coordination:  normal finger-to-nose without dysmetria  Station/Gait:  deferred    Stroke Scales       NIHSS  1a. Level of Consciousness 0-->Alert, keenly responsive   1b. LOC Questions 0-->Answers both questions correctly   1c. LOC Commands 0-->Performs both tasks correctly   2.   Best Gaze 0-->Normal   3.   Visual 0-->No visual loss   4.   Facial Palsy 1-->Minor paralysis (flattened " nasolabial fold, asymmetry on smiling)   5a. Motor Arm, Left 0-->No drift, limb holds 90 (or 45) degrees for full 10 secs   5b. Motor Arm, Right 0-->No drift, limb holds 90 (or 45) degrees for full 10 secs   6a. Motor Leg, Left 0-->No drift, leg holds 30 degree position for full 5 secs   6b. Motor Leg, right 0-->No drift, leg holds 30 degree position for full 5 secs   7.   Limb Ataxia 0-->Absent   8.   Sensory 0-->Normal, no sensory loss   9.   Best Language 0-->No aphasia, normal   10. Dysarthria 1-->Mild-to-moderate dysarthria, patient slurs at least some words and, at worst, can be understood with some difficulty   11. Extinction and Inattention  0-->No abnormality   Total 2 (11/03/24 0910)       Imaging/Labs   (Bolded imaging and labs new and/or personally reviewed or re-reviewed by me today)    MRI/Head CT tiny infarct of left occipital lobe    Intracranial Vasculature No LVO    Cervical Vasculature MRA neck showed focal high-grade stenosis proximal right subclavian artery      Echocardiogram Grossly normal LV systolic function with LVEF 55 to 60%.Regional wall motion  abnormalities cannot be excluded due to limited visualization.There is  moderate concentric left ventricular hypertrophy.  The right ventricular systolic function is normal.  Moderate aortic valve sclerosis noted   EKG/Telemetry   Sinus rhythm with second degreee ( Mobitz 1) AV block   Non-specific intra-ventricular conduction block        LDL  11/1/2024: 51 mg/dL   A1C  10/2/2024: 6.2 %   Troponin 11/1/2024: 50 ng/L     Other labs reviewed by me today:

## 2024-11-03 NOTE — PROGRESS NOTES
Care Management Follow Up    Length of Stay (days): 2    Expected Discharge Date: 11/04/2024     Concerns to be Addressed:       Patient plan of care discussed at interdisciplinary rounds: No    Anticipated Discharge Disposition: Transitional Care, Skilled Nursing Facility  Disposition Comments: [facility]           Anticipated Discharge Services: Transportation Services  Anticipated Discharge DME: None    Patient/family educated on Medicare website which has current facility and service quality ratings:    Education Provided on the Discharge Plan:    Patient/Family in Agreement with the Plan: yes    Referrals Placed by CM/SW: Post Acute Facilities, Transportation  Private pay costs discussed: Not applicable    Discussed  Partnership in Safe Discharge Planning  document with patient/family: No     Handoff Completed: No, handoff not indicated or clinically appropriate    Additional Information:  Patient not medically ready for discharge today.  placed call to [facility] to cancel ride for today. Updated [facility] that patient would not be discharging today and staff would call to update when medically ready for discharge.  placed call to daughter Pushpa and confirmed that patient would not be discharging today. Daughter in understanding and agreement.     Next Steps:   - Follow up with [facility] when patient is ready for discharge.  - Transportation     JOAN Benavides, [facility]  Inpatient Care Coordination  Social Work  [phone]  [facility]

## 2024-11-03 NOTE — CONSULTS
"CLINICAL NUTRITION SERVICES  -  ASSESSMENT NOTE    Recommendations Ordered by Registered Dietitian (RD):   - Ensure BID    Malnutrition:   % Weight Loss:  Weight loss does not meet criteria for malnutrition   % Intake:  Incomplete history - unable to evaluate   Subcutaneous Fat Loss:  Orbital region mild depletion, Upper arm region mild depletion, and Thoracic region mild depletion  Muscle Loss:  Temporal region moderate depletion, Clavicle bone region moderate depletion, Acromion bone region moderate depletion, and Dorsal hand region moderate depletion  Fluid Retention:  None noted    Malnutrition Diagnosis: Moderate malnutrition  In Context of:  Chronic illness or injury     REASON FOR ASSESSMENT  Chris Bhakta is a 78 year old male seen by Registered Dietitian for Nutrition Admission Risk Screen Received -   Have you recently lost weight without trying ? - \"unsure\"  Have you been eating poorly due to a decreased appetite ?- \"no\"    NUTRITION HISTORY  - Information obtained from patient in room, chart review. (Noted to be a poor historian)   - Patient comes from [facility] - where meals would be served TID.   - He reports some potential recent wt loss - maybe a couple pounds.   - Pt states he eats breakfast most days \"if it's around\", and usually just one other meal.    CURRENT NUTRITION ORDERS  Diet Order:     Soft and Bite Sized, thin liquids     Current Intake/Tolerance:  Ate all of his lunch - hot cereal, yogurt.   Larger breakfast was ordered (900 kcal, 57 g protein), intake not documented.     NUTRITION FOCUSED PHYSICAL ASSESSMENT FOR DIAGNOSING MALNUTRITION)  Yes         Observed:    Muscle wasting (refer to documentation in Malnutrition section) and Subcutaneous fat loss (refer to documentation in Malnutrition section)    Obtained from Chart/Interdisciplinary Team:  Skin - small open area to sacrum per RN charting     ANTHROPOMETRICS  Height: 5' 11\"  Weight: 166 lbs 7.16 oz (75.5 kg)   Body mass index is " 23.21 kg/m .  Weight Status:  Normal BMI  IBW: 78.2 kg   % IBW: 97%  Weight History: Pt thought he might have lost a few pounds, but unsure. 2.3% loss since 10/17? 2.5 weeks.   Wt Readings from Last 10 Encounters:   11/02/24 75.5 kg (166 lb 7.2 oz)   10/30/24 77.6 kg (171 lb)   10/28/24 77.6 kg (171 lb)   10/23/24 77.6 kg (171 lb)   10/17/24 77.7 kg (171 lb 4.8 oz)   10/15/24 79.4 kg (175 lb)   10/07/24 79.4 kg (175 lb)   10/04/24 78.5 kg (173 lb 1 oz)   02/09/22 93 kg (205 lb)   12/17/21 93 kg (205 lb)       LABS  Labs reviewed    MEDICATIONS  Medications reviewed  Miralax, Senokot   Vitamin D3 - 2000 units    ASSESSED NUTRITION NEEDS PER APPROVED PRACTICE GUIDELINES:  Dosing Weight 76 kg   Estimated Energy Needs: 9036-1274 kcals (25-30 Kcal/Kg)  Justification: maintenance  Estimated Protein Needs:  grams protein (1.2-1.5 g pro/Kg)  Justification: preservation of lean body mass  Estimated Fluid Needs: 1 mL/kcal   Justification: maintenance    MALNUTRITION:  % Weight Loss:  Weight loss does not meet criteria for malnutrition   % Intake:  Incomplete history - unable to evaluate   Subcutaneous Fat Loss:  Orbital region mild depletion, Upper arm region mild depletion, and Thoracic region mild depletion  Muscle Loss:  Temporal region moderate depletion, Clavicle bone region moderate depletion, Acromion bone region moderate depletion, and Dorsal hand region moderate depletion  Fluid Retention:  None noted    Malnutrition Diagnosis: Moderate malnutrition  In Context of:  Chronic illness or injury    NUTRITION DIAGNOSIS:  Predicted inadequate nutrient (energy, protein) intake related to altered mentation.     NUTRITION INTERVENTIONS  Recommendations / Nutrition Prescription  Diet per SLP  Add Ensure BID     Implementation  Nutrition education: Per Provider order if indicated   Medical Food Supplement    Nutrition Goals  Intake of >/=75% meals BID-TID.     MONITORING AND EVALUATION:  Progress towards goals will be  monitored and evaluated per protocol and Practice Guidelines    Nicole Mchugh RD, LD  Pager: [phone]  Available on [employer]

## 2024-11-03 NOTE — PROGRESS NOTES
A&O x 3, disoriented to situation.  VSS on RA, Denied pain/SOB. Left hip incision WNL. Diaz with adequate output, slightly leaking. Neuro intact except garbled speech. Plan for abdominal/pelvic CT at 9 am today, premedicated with SoluMedrol per order due hx of contrast allergy. Communicated with morning nurse to administer Benadryl 1 hour prior to CT scan per order. Continue plan of care.

## 2024-11-03 NOTE — PROGRESS NOTES
Reason for Admission: L.occipital lobe infarct, R subclavian artery stenosis, and UTI    Cognitive/Mentation: A/Ox dis to situation  Neuros/CMS: Intact ex slurred, garbled speech, LLE 4/5 d/t L.hip surgery gen weakness  VS: Stable SBP < 220.   Tele: SR.  /GI: Willis for rentention  Pulmonary: LS Clear.  Pain: HA 8/10 PRN hydroxyzine given .     Drains/Lines: PIV NS infusing at 100 ml/hr  Skin: scattered bruising and scab, redness on coccyx and scrotum, L. Hip incision site.  Activity: Assist x 2 with lift.  Diet: Soft bite with thin liquids. Takes pills whole with water.   Discharge: 10/3 TCU    Aggression Stoplight Tool: Green    End of shift summary: New 16 fr willis placed still leaking.

## 2024-11-03 NOTE — PROGRESS NOTES
[facility]    Medicine Progress Note - Hospitalist Service    Date of Admission:  11/1/2024    Assessment & Plan      Chris Bhakta is a 78 year old male with past medical history significant for CAD, CABG (2009), HFpEF, HTN, HLD, CKD stage III, admitted on 11/1/2024 with hematuria and hypotension. He was also found to have acute CVA.     Hematuria  UTI  Hypotension, likely hypovolemic vs septic   Urinary retention s/p willis placement  Pt recently underwent L YELENA for DJD and discharged 10/3 to TCU. Had urinary retention and willis placed. Today noted to have hematuria, also noted hypotension, BP at care facility 77/44 (87/53 in ED initially). WBC 8.5. UA w/ 182 RBC, WBCs, WBC clumps. CXR w/o acute process.   * BPs improved with volume resuscitation. Hematuria clearing   * Urine cultures positive for proteus mirabilis   * CT A/P obtained on 11/2 negative for urinary calculi or hydronephrosis. Benign appearing renal cysts but no other pathology.   - Continue Ceftriaxone 1g IV q24  - Stop IV fluids, encourage PO intake   - Continue tamsulosin 0.4 mg daily    Acute CVA  Hx traumatic SAH  Dtr noted pt had slurred speech this week at TCU. Noted to have very dry MM in the ED. CT head in ED unremarkable. MRI w/ tiny acute infarct L occipital lobe, MRA w/ stenosis R subclavian, dissection not excluded.   - Stroke neurology consulted, appreciate recommendations   - telemetry  - q4 neuro checks  - ASA 81 mg daily for secondary stroke prevention   - Atorvastatin 40mg daily   - PT/OT/SLP    EBONI, mild  Mild anion gap acidosis  Baseline creatinine 0.8-0.9. Creatinine in the ED 1.36. lactic 2.0.   * Cr slightly improved to 1.19 after fluids   - Stop IV fluids today   - AM BMP     Physical deconditioning  Failure to Thrive   Passive suicidal ideation   Poor PO intake  Pts daughter reports that pt has not been eating or drinking well at TCU. He will often make some passive suicidal ideations stating  that he just wants to die and has nothing to live for. No specific plan.   - Will consult psychiatry for Monday   - Overall, suicide risk is low, thoughts are just passive with no active plan of self harm   - PT/OT   - Likely discharge to TCU when medically stable.     Chronic HFpEF  CAD s/p CABG 2009, PCI  HTN  HLD  [Needs rec- coreg 3.125 mg BID, Jardiance 10 mg daily, entresto BID]  Recent echo 10/2024 EF 55-60% w/o WMA.   - hold antihypertensives for permissive hypertension    DM II  Recent A1c 10/2/24 6.2%.   - hold metformin and Jardiance for now   - BID and HS blood sugars    L hip DJD s/p YELENA  Chronic pain syndrome  On chronic narcotics, appears related to cervical spine disease. 10/1 s/p YELENA. Had postoperative hypotension requiring ICU and pressors.   - PT  - TID acetaminophen  - dilaudid 2-4 mg q4 prn  - duloxetine 30 mg BID      Diet: Soft & Bite Sized Diet (level 6) Thin Liquids (level 0) (Direct supervision during meals due to mild impulsivity. Small sips. Upright position for meals. Alternate liquids/solids.)  Snacks/Supplements Adult: Ensure Enlive; Between Meals    DVT Prophylaxis: Pneumatic Compression Devices  Diaz Catheter: PRESENT, indication: Acute retention or obstruction  Lines: None     Cardiac Monitoring: ACTIVE order. Indication: Stroke, acute (48 hours)  Code Status: No CPR- Do NOT Intubate      Clinically Significant Risk Factors         # Hyponatremia: Lowest Na = 132 mmol/L in last 2 days, will monitor as appropriate  # Hypochloremia: Lowest Cl = 97 mmol/L in last 2 days, will monitor as appropriate             # Heart failure with preserved ejection fraction: EF >50% and home medication list includes sacubitril/valsartan (Entresto)           # Moderate Malnutrition: based on nutrition assessment, PRESENT ON ADMISSION          Disposition Plan     Medically Ready for Discharge: Anticipated in 2-4 Days      Dinora Galaviz MD  Hospitalist Service  [facility]  Hospital  Securely message with [employer] (more info)  Text page via [facility] Paging/Directory   ______________________________________________________________________    Interval History   NAEO. Pt reports not doing well today. Has pain everywhere. Not eating or drinking much today.     Physical Exam   Vital Signs: Temp: (!) 96.2  F (35.7  C) Temp src: Oral BP: 135/74 Pulse: 94   Resp: 18 SpO2: 98 % O2 Device: None (Room air)    Weight: 166 lbs 7.16 oz    Constitutional: Awake, alert, cooperative, no apparent distress.  Eyes: Conjunctiva and pupils examined and normal.  HEENT: Moist mucous membranes, normal dentition.  Respiratory: Clear to auscultation bilaterally, no crackles or wheezing.  Cardiovascular: Regular rate and rhythm, normal S1 and S2, and no murmur noted.  GI: Soft, non-distended, non-tender, normal bowel sounds.  Skin: No rashes, no cyanosis, no edema.  Musculoskeletal: No joint swelling, erythema or tenderness.  Neurologic: Cranial nerves 2-12 intact, normal strength and sensation.  Psychiatric: Alert, oriented to person, place and time, no obvious anxiety or depression.    Medical Decision Making       45 MINUTES SPENT BY ME on the date of service doing chart review, history, exam, documentation & further activities per the note.      Data     I have personally reviewed the following data over the past 24 hrs:    6.6  \   11.4 (L)   / 196     138 104 32.4 (H) /  240 (H)   4.5 19 (L) 1.19 (H) \       Imaging results reviewed over the past 24 hrs:   Recent Results (from the past 24 hours)   CT Abdomen Pelvis w/o Contrast    Narrative    EXAM: CT ABDOMEN PELVIS W/O CONTRAST  LOCATION: [facility]  DATE: 11/3/2024    INDICATION: Haematuria, EBONI, ?stone  COMPARISON: None.  TECHNIQUE: CT scan of the abdomen and pelvis was performed without IV contrast. Multiplanar reformats were obtained. Dose reduction techniques were used.  CONTRAST: None.    FINDINGS:   LOWER CHEST: Moderate  scarring and/or fibrosis in both lung bases.    HEPATOBILIARY: Cholelithiasis. Unremarkable noncontrast appearance of the liver.    PANCREAS: Moderate atrophy and fatty replacement.    SPLEEN: Normal.    ADRENAL GLANDS: Normal.    KIDNEYS/BLADDER: No definite urinary calculi. However, the ureterovesical junctions are obscured by artifact from bilateral hip replacements. No hydronephrosis. Multiple benign appearing renal cysts for which no follow-up is recommended. Vascular   calcification in the lenka of both kidneys. A Diaz catheter is present within the bladder.    BOWEL: Bowel loops are normal caliber. Mildly increased stool in the colon. The appendix is not identified. Small amount of peritoneal free fluid in the pelvis.    LYMPH NODES: Normal.    VASCULATURE: Diffuse calcified arterial plaque. No evidence of aortic aneurysm.    PELVIC ORGANS: The prostate gland is obscured by artifact from bilateral hip replacements.    MUSCULOSKELETAL: Bilateral hip replacements partially obscuring the pelvis. The bones are demineralized. Postoperative and degenerative changes in the lumbar spine.      Impression    IMPRESSION:   1.  No definite urinary calculi or hydronephrosis. However, the ureterovesical junctions are obscured by artifact from bilateral hip replacements.  2.  A Diaz catheter is present within the bladder. No hydronephrosis.  3.  Cholelithiasis.  4.  Moderate scarring and/or fibrosis in both lung bases.     CTA Head Neck with Contrast    Narrative    EXAM: CTA HEAD NECK W CONTRAST  LOCATION: [facility]  DATE: 11/3/2024    INDICATION: CVA, possible R subclavian stenosis  dissection  COMPARISON: MRI 11/1/2024.  CONTRAST: 67mL Isovue 370  TECHNIQUE: Axial helical CT images of the head and neck vessels obtained during the arterial phase of intravenous contrast administration. Axial 2D reconstructed images and multiplanar 3D MIP reconstructed images of the head and neck vessels were    performed by the technologist. Dose reduction techniques were used. All stenosis measurements made according to NASCET criteria unless otherwise specified.    FINDINGS:   HEAD CTA:  ANTERIOR CIRCULATION: No stenosis/occlusion, aneurysm, or high flow vascular malformation. Fetal origin of both posterior cerebral arteries from the anterior circulation.    POSTERIOR CIRCULATION: No stenosis/occlusion, aneurysm, or high flow vascular malformation. Balanced vertebral arteries supply a normal basilar artery.     DURAL VENOUS SINUSES: Expected enhancement of the major dural venous sinuses.    NECK CTA:  RIGHT CAROTID: Atherosclerotic plaque results in less than 50% stenosis in the right ICA. No dissection.    LEFT CAROTID: Atherosclerotic plaque results in less than 50% stenosis in the left ICA. No dissection.    VERTEBRAL ARTERIES: No focal stenosis or dissection. Balanced vertebral arteries.    AORTIC ARCH: Classic aortic arch anatomy. Calcified and noncalcified plaque involving the origin of the great vessels. Somewhat tortuous brachiocephalic trunk. The brachiocephalic and right subclavian arteries are patent without evidence of high-grade   stenosis or intimal filling defect to suggest dissection.      NONVASCULAR STRUCTURES: Unremarkable.      Impression    IMPRESSION:   HEAD CTA:   1.  No significant stenosis, aneurysm, or high flow vascular malformation.    NECK CTA:  1.  Atherosclerotic disease of the aortic arch great vessels. No evidence of high-grade stenosis or dissection.   Echocardiogram Complete - For age > 60 yrs    Narrative    [id number]  [id number]  [id number]^LEEANN^HALLIE^ROGELIO     [facility]  Echocardiography Laboratory  [address] [place] [address]     Name: STEVE CERVANTES  MRN: [id number]  : 1945  Study Date: 2024 11:18 AM  Age: 78 yrs  Gender: Male  Patient Location: [facility]  Reason For Study: Cerebrovascular Incident  Ordering Physician:  HALLIE BRADSHAW  Referring Physician: HALLIE BRADSHAW  Performed By: EMERSON Lieberman     BSA: 1.9 m2  Height: 71 in  Weight: 160 lb  HR: 86  BP: 100/60 mmHg  ______________________________________________________________________________  Procedure  Complete Portable Echo Adult. Optison (NDC #6017-0015) given intravenously.  ______________________________________________________________________________  Interpretation Summary     Technically difficult study     Grossly normal LV systolic function with LVEF 55 to 60%.Regional wall motion  abnormalities cannot be excluded due to limited visualization.There is  moderate concentric left ventricular hypertrophy.  The right ventricular systolic function is normal.  Moderate aortic valve sclerosis noted     Compared to prior study dated 10/1/2024 no significant changes  ______________________________________________________________________________  Left Ventricle  The left ventricle is normal in size. There is moderate concentric left  ventricular hypertrophy. Diastolic Doppler findings (E/E' ratio and/or other  parameters) suggest left ventricular filling pressures are indeterminate.  Grossly normal LV systolic function with LVEF 55 to 60%. Regional wall motion  abnormalities cannot be excluded due to limited visualization.     Right Ventricle  The right ventricle is normal size. The right ventricular systolic function is  normal.     Atria  The left atrium is mildly dilated. Right atrial size is normal. There is no  color Doppler evidence of an atrial shunt.     Mitral Valve  There is trace mitral regurgitation.     Tricuspid Valve  Right ventricular systolic pressure could not be approximated due to  inadequate tricuspid regurgitation.     Aortic Valve  The aortic valve is trileaflet with aortic valve sclerosis. No aortic  regurgitation is present. No aortic stenosis is present.     Pulmonic Valve  The pulmonic valve is not well visualized.      Vessels  The aortic root is normal size. IVC diameter <2.1 cm collapsing >50% with  sniff suggests a normal RA pressure of 3 mmHg.     Pericardium  There is no pericardial effusion.     Rhythm  Sinus rhythm was noted.  ______________________________________________________________________________  MMode/2D Measurements & Calculations  IVSd: 1.6 cm     LVIDd: 4.2 cm  LVIDs: 2.3 cm  LVPWd: 1.6 cm  FS: 44.8 %  LV mass(C)d: 280.5 grams  LV mass(C)dI: 146.3 grams/m2  Ao root diam: 3.3 cm  LA dimension: 4.1 cm  LA/Ao: 1.2  LVOT diam: 2.4 cm  LVOT area: 4.4 cm2  Ao root diam index Ht(cm/m): 1.8  Ao root diam index BSA (cm/m2): 1.7  LA Volume (BP): 52.3 ml  LA Volume Index (BP): 27.2 ml/m2  RV Base: 2.6 cm     RWT: 0.77  TAPSE: 1.9 cm     Doppler Measurements & Calculations  MV E max roxane: 98.5 cm/sec  MV dec time: 0.10 sec  PA acc time: 0.09 sec  E/E' avg: 10.7  Lateral E/e': 6.5  Medial E/e': 14.8     ______________________________________________________________________________  Report approved by: Jenna Flores 11/03/2024 12:42 PM

## 2024-11-03 NOTE — PHARMACY-CONSULT NOTE
Pharmacy Consult to evaluate for medication related stroke core measures    Chris Bhakta, 78 year old male admitted for hematuria, slurred speech on 11/1/2024.    Thrombolytic was not given because of Time from onset contraindications, Clinical contraindications    VTE Prophylaxis SCDs /PCDs placed on 11/2, as appropriate prior to end of hospital day 2.    Antithrombotic: aspirin started on 11/2, as appropriate by end of hospital day 2. Continue antithrombotic therapy on discharge to meet quality measures, unless contraindicated.    Anticoagulation if history of A-fib/flutter: Patient does not have history of A-fib/flutter - anticoagulation not required for medication related stroke core measures.     LDL Cholesterol Calculated   Date Value Ref Range Status   11/01/2024 51 <100 mg/dL Final   09/28/2016 115 (H) <100 mg/dL Final     Comment:     Above desirable:  100-129 mg/dl   Borderline High:  130-159 mg/dL   High:             160-189 mg/dL   Very high:       >189 mg/dl         Patient currently receiving Lipitor (atorvastatin) continue statin on discharge to meet quality measures, unless contraindicated.    Recommendations: None at this time    Thank you for the consult.    Janneth Lara [facility] 11/3/2024 12:02 PM

## 2024-11-04 ENCOUNTER — TELEPHONE (OUTPATIENT)
Dept: NEUROLOGY | Facility: CLINIC | Age: 79
End: 2024-11-04

## 2024-11-04 VITALS
TEMPERATURE: 97.2 F | OXYGEN SATURATION: 98 % | RESPIRATION RATE: 18 BRPM | SYSTOLIC BLOOD PRESSURE: 124 MMHG | HEIGHT: 71 IN | HEART RATE: 78 BPM | DIASTOLIC BLOOD PRESSURE: 70 MMHG | BODY MASS INDEX: 23.3 KG/M2 | WEIGHT: 166.45 LBS

## 2024-11-04 PROCEDURE — 999N000147 HC STATISTIC PT IP EVAL DEFER

## 2024-11-04 PROCEDURE — 99239 HOSP IP/OBS DSCHRG MGMT >30: CPT | Performed by: INTERNAL MEDICINE

## 2024-11-04 PROCEDURE — 99222 1ST HOSP IP/OBS MODERATE 55: CPT | Performed by: PHYSICIAN ASSISTANT

## 2024-11-04 PROCEDURE — 250N000013 HC RX MED GY IP 250 OP 250 PS 637: Performed by: INTERNAL MEDICINE

## 2024-11-04 RX ORDER — DULOXETIN HYDROCHLORIDE 30 MG/1
30 CAPSULE, DELAYED RELEASE ORAL EVERY MORNING
Status: DISCONTINUED | OUTPATIENT
Start: 2024-11-05 | End: 2024-11-04 | Stop reason: HOSPADM

## 2024-11-04 RX ORDER — DULOXETIN HYDROCHLORIDE 30 MG/1
CAPSULE, DELAYED RELEASE ORAL
DISCHARGE
Start: 2024-11-04

## 2024-11-04 RX ORDER — HYDROMORPHONE HYDROCHLORIDE 2 MG/1
2 TABLET ORAL EVERY 6 HOURS PRN
Qty: 12 TABLET | Refills: 0 | Status: SHIPPED | OUTPATIENT
Start: 2024-11-04 | End: 2024-11-05

## 2024-11-04 RX ORDER — ASPIRIN 81 MG/1
81 TABLET ORAL DAILY
DISCHARGE
Start: 2024-11-05

## 2024-11-04 RX ORDER — DULOXETIN HYDROCHLORIDE 30 MG/1
60 CAPSULE, DELAYED RELEASE ORAL EVERY EVENING
Status: DISCONTINUED | OUTPATIENT
Start: 2024-11-04 | End: 2024-11-04 | Stop reason: HOSPADM

## 2024-11-04 RX ORDER — CEFUROXIME AXETIL 500 MG/1
500 TABLET ORAL 2 TIMES DAILY
DISCHARGE
Start: 2024-11-04 | End: 2024-11-11

## 2024-11-04 RX ORDER — ATORVASTATIN CALCIUM 40 MG/1
40 TABLET, FILM COATED ORAL EVERY EVENING
DISCHARGE
Start: 2024-11-04

## 2024-11-04 RX ADMIN — POLYETHYLENE GLYCOL 3350 17 G: 17 POWDER, FOR SOLUTION ORAL at 09:48

## 2024-11-04 RX ADMIN — ASPIRIN 81 MG: 81 TABLET, COATED ORAL at 09:49

## 2024-11-04 RX ADMIN — ACETAMINOPHEN 1000 MG: 500 TABLET, FILM COATED ORAL at 14:19

## 2024-11-04 RX ADMIN — HYDROMORPHONE HYDROCHLORIDE 2 MG: 2 TABLET ORAL at 03:54

## 2024-11-04 RX ADMIN — SENNOSIDES AND DOCUSATE SODIUM 2 TABLET: 50; 8.6 TABLET ORAL at 09:49

## 2024-11-04 RX ADMIN — DULOXETINE HYDROCHLORIDE 30 MG: 30 CAPSULE, DELAYED RELEASE ORAL at 09:49

## 2024-11-04 RX ADMIN — ACETAMINOPHEN 1000 MG: 500 TABLET, FILM COATED ORAL at 09:49

## 2024-11-04 RX ADMIN — Medication 50 MCG: at 09:49

## 2024-11-04 RX ADMIN — TAMSULOSIN HYDROCHLORIDE 0.4 MG: 0.4 CAPSULE ORAL at 09:49

## 2024-11-04 ASSESSMENT — ACTIVITIES OF DAILY LIVING (ADL)
ADLS_ACUITY_SCORE: 0

## 2024-11-04 NOTE — CONSULTS
Initial Psychiatric Consult   Consult date: November 4, 2024         Reason for Consult, requesting source:      Requesting source: Tiara Nunez    Labs and imaging reviewed. Patient seen and evaluated by Jesus Jade PA-C          HPI:   77 yo man admitted 11/1 to Neuro from TCU with small acute infarct of L occipital lobe.     Psychiatry consulted due to PTA statements of hopelessness, and lack of purpose living made to daughter.   On approach today patient is polite and engaged. States he is feeling well today. He slept well. Feels that he is eating well. Denies any issues with mood. Denies having thoughts of not being alive.         Past Psychiatric History:           Substance Use and History:           Past Medical History:   PAST MEDICAL HISTORY:   Past Medical History:   Diagnosis Date    Anxiety disorder     BPH (benign prostatic hyperplasia)     CAD (coronary artery disease)     Cardiomyopathy (H)     Cataract     Cervical cord myelomalacia (H)     CHF (congestive heart failure) (H)     Chronic ischemic heart disease     Chronic pain syndrome with opioid dependence     CKD (chronic kidney disease) stage 3, GFR 30-59 ml/min (H)     Closed comminuted intertrochanteric fracture of proximal end of left femur (H)     Closed fracture of neck of left femur (H)     Congenital multiple renal cysts     Cyst of right kidney     Dysarthria     Dyslipidemia     Dysphagia     Dyspnea     Exudative age-related macular degeneration of right eye with inactive choroidal neovascularization (H)     Femur fracture, left 09/2024    surgery in october    Folic acid deficiency     GERD (gastroesophageal reflux disease)     Hyperglycemia     Hyperlipidemia     Hypertension     Insomnia     Macular degeneration     MDD (major depressive disorder)     Mixed hyperlipidemia     Obesity     RAYMOND (obstructive sleep apnea)     Osteoarthritis     Osteoporosis     Pathological fracture of left femur due to age-related osteoporosis (H)      Primary osteoarthritis of right knee     PVD (peripheral vascular disease) (H)     Renal insufficiency syndrome     SAH (subarachnoid hemorrhage) (H)     Somnolence     Spinal stenosis     TBI (traumatic brain injury) (H)     Toxic encephalopathy     Type 2 diabetes mellitus (H)     Urinary incontinence        PAST SURGICAL HISTORY:   Past Surgical History:   Procedure Laterality Date    ARTHROPLASTY HIP Left 10/1/2024    Procedure: LEFT TOTAL HIP ARTHROPLASTY;  Surgeon: Kathryn Nicole MD;  Location: [facility] OR    CARPAL TUNNEL RELEASE      CERVICAL FUSION      CERVICAL SPINE SURGERY  2009    X2    CORONARY ARTERY BYPASS GRAFT  2003    KIDNEY CYST REMOVAL      LUMBAR DECOMPRESSION L3-S1  2010    LUMBAR SPINE SURGERY  2009    PTCA OF LAD (FOR FAILED GRAFT FAILURE)  2004    REMOVE HARDWARE HIP NAILING Left 10/1/2024    Procedure: LEFT FEMORAL INTRAMEDULARY NAIL REMOVAL;  Surgeon: Kathryn Nicole MD;  Location: [facility] OR    TOTAL HIP ARTHROPLASTY Right 2020    ULNAR TUNNEL RELEASE               Family History:   FAMILY HISTORY: No family history on file.    Family Psychiatric History:         Social History:   SOCIAL HISTORY:   Social History     Tobacco Use    Smoking status: Former     Current packs/day: 0.00     Average packs/day: 0.5 packs/day for 53.9 years (26.9 ttl pk-yrs)     Types: Cigarettes     Start date: 1970     Quit date: 2024     Years since quittin.2    Smokeless tobacco: Never   Substance Use Topics    Alcohol use: Not Currently     Comment: rare              Physical ROS:   The 10 point Review of Systems is negative other than noted in the HPI or here.           Medications:     Current Facility-Administered Medications   Medication Dose Route Frequency Provider Last Rate Last Admin    acetaminophen (TYLENOL) tablet 1,000 mg  1,000 mg Oral or Feeding Tube TID Anton Moreno MD   1,000 mg at 24 5677    aspirin EC tablet 81 mg  81 mg Oral Daily Josh  Anton Bowles MD        Or    aspirin (ASA) chewable tablet 81 mg  81 mg Oral or NG Tube Daily Anton Moreno MD   81 mg at 11/03/24 0821    aspirin (ASA) Suppository 75 mg  75 mg Rectal Daily Bravo Frias MD        atorvastatin (LIPITOR) tablet 40 mg  40 mg Oral QPM Td Rothman MD   40 mg at 11/03/24 2047    [Held by provider] carvedilol (COREG) tablet 3.125 mg  3.125 mg Oral BID w/meals Anton Moreno MD        cefTRIAXone (ROCEPHIN) 1 g vial to attach to  mL bag for ADULTS or NS 50 mL bag for PEDS  1 g Intravenous Q24H Anton Moreno MD   1 g at 11/03/24 2108    DULoxetine (CYMBALTA) DR capsule 30 mg  30 mg Oral or Feeding Tube BID Anton Moreno MD   30 mg at 11/03/24 2047    latanoprost (XALATAN) 0.005 % ophthalmic solution 1 drop  1 drop Both Eyes At Bedtime Anton Moreno MD   1 drop at 11/03/24 2112    lidocaine (XYLOCAINE) 2 % external gel   Urethral Once Dinora Galaviz MD        polyethylene glycol (MIRALAX) Packet 17 g  17 g Oral or NG Tube Daily Anton Moreno MD   17 g at 11/03/24 0824    [Held by provider] sacubitril-valsartan (ENTRESTO) 24-26 MG per tablet 1 tablet  1 tablet Oral BID Anton Moreno MD        senna-docusate (SENOKOT-S/PERICOLACE) 8.6-50 MG per tablet 2 tablet  2 tablet Oral or Feeding Tube BID Anton Moreno MD   2 tablet at 11/03/24 2047    sodium chloride (PF) 0.9% PF flush 3 mL  3 mL Intracatheter Q8H Anton Moreno MD   3 mL at 11/04/24 0230    tamsulosin (FLOMAX) capsule 0.4 mg  0.4 mg Oral QAM Anton Moreno MD   0.4 mg at 11/03/24 0821    vitamin D3 (CHOLECALCIFEROL) tablet 50 mcg  50 mcg Oral Daily Anton Moreno MD   50 mcg at 11/03/24 0821              Allergies:     Allergies   Allergen Reactions    Iodinated Contrast Media Shortness Of Breath    Atorvastatin Fatigue    Lisinopril Cough    Oxycodone Confusion    Sertraline  "Dizziness          Labs:     Recent Results (from the past 48 hours)   Glucose by meter    Collection Time: 11/02/24 10:06 PM   Result Value Ref Range    GLUCOSE BY METER POCT 204 (H) 70 - 99 mg/dL   CBC with platelets    Collection Time: 11/03/24 10:37 AM   Result Value Ref Range    WBC Count 6.6 4.0 - 11.0 10e3/uL    RBC Count 3.40 (L) 4.40 - 5.90 10e6/uL    Hemoglobin 11.4 (L) 13.3 - 17.7 g/dL    Hematocrit 33.6 (L) 40.0 - 53.0 %    MCV 99 78 - 100 fL    MCH 33.5 (H) 26.5 - 33.0 pg    MCHC 33.9 31.5 - 36.5 g/dL    RDW 13.2 10.0 - 15.0 %    Platelet Count 196 150 - 450 10e3/uL   Basic metabolic panel    Collection Time: 11/03/24 10:37 AM   Result Value Ref Range    Sodium 138 135 - 145 mmol/L    Potassium 4.5 3.4 - 5.3 mmol/L    Chloride 104 98 - 107 mmol/L    Carbon Dioxide (CO2) 19 (L) 22 - 29 mmol/L    Anion Gap 15 7 - 15 mmol/L    Urea Nitrogen 32.4 (H) 8.0 - 23.0 mg/dL    Creatinine 1.19 (H) 0.67 - 1.17 mg/dL    GFR Estimate 62 >60 mL/min/1.73m2    Calcium 9.4 8.8 - 10.4 mg/dL    Glucose 240 (H) 70 - 99 mg/dL          Physical and Psychiatric Examination:     /62 (BP Location: Left arm)   Pulse 84   Temp 96.8  F (36  C) (Oral)   Resp 18   Ht 1.803 m (5' 11\")   Wt 75.5 kg (166 lb 7.2 oz)   SpO2 96%   BMI 23.21 kg/m    Weight is 166 lbs 7.16 oz  Body mass index is 23.21 kg/m .    Physical Exam:  I have reviewed the physical exam as documented by by the medical team and agree with findings and assessment and have no additional findings to add at this time.    Mental Status Exam:    Appearance: awake, alert  Attitude:  cooperative  Eye Contact:  good  Mood:  better  Affect:  appropriate and in normal range  Speech:  dysarthria  Language: Fluent in english   Psychomotor Behavior:  no evidence of tardive dyskinesia, dystonia, or tics  Thought Process:  logical  Associations:  no loose associations  Thought Content:  no evidence of suicidal ideation or homicidal ideation  Insight:  fair  Judgement:  " fair  Oriented to:  time, person, and place  Attention Span and Concentration:  fair  Recent and Remote Memory:  fair  Fund of Knowledge: Appropriate   Gait and Station:                DSM-5 Diagnosis:   Major depressive disorder          Assessment:   Patient denies any mood symptoms, or thoughts of death. Denies any intention to harm self or anyone else. Offers minimal participation with writer, but is pleasant on interaction. Agrees to alert staff if feeling unsafe, or symptoms present.   Due to concern for intermittent statements of hopelessness will increase his existing dose of Cymbalta.           Summary of Recommendations:   Increase Cymbalta to 30mg QAM, and 60mg Qevening  Psychiatry to sign off.       Jesus Jade PA-C

## 2024-11-04 NOTE — PLAN OF CARE
Occupational Therapy Discharge Summary    Reason for therapy discharge:    Discharged to transitional care facility.    Progress towards therapy goal(s). See goals on Care Plan in [facility] electronic health record for goal details.  Goals partially met.  Barriers to achieving goals:   discharge from facility.    Therapy recommendation(s):    Continued therapy is recommended.  Rationale/Recommendations:  Pt to return to TCU and continue plan rehab to increase indep with functional mobility and self cares.

## 2024-11-04 NOTE — DISCHARGE SUMMARY
[facility]  Hospitalist Discharge Summary      Date of Admission:  11/1/2024  Date of Discharge:  11/4/2024  Discharging Provider: Tiara Nunez MD  Discharge Service: Hospitalist Service    Discharge Diagnoses     Hematuria, resolving.  Proteus UTI associated with willis, improving.  Hypotension, likely hypovolemic versus septic, resolved.  Urinary retention s/p Willis placement.  Acute CVA.  History of traumatic SAH.  Mild EBONI, improved.  Mild anion gap acidosis, resolved.  Physical deconditioning.  Failure to thrive.  Passive suicidal ideations, resolved.  Poor p.o. intake, improving.  Chronic heart failure with preserved EF.  CAD s/p CABG 2009, s/p PCI.  Essential hypertension.  Hyperlipidemia.  Type 2 diabetes mellitus.  Left hip degenerative joint disease s/p YELENA.  Chronic pain syndrome.  Moderate malnutrition in context of chronic illness.    Clinically Significant Risk Factors     # Moderate Malnutrition: based on nutrition assessment      Follow-ups Needed After Discharge   Follow-up Appointments       Follow Up ([facility])      Can follow-up for office voiding trial vs voiding trial at TCU in 10-14 days.   Recommend follow-up with urologist in 3-4wks for recheck urine for hematuria, possible cystoscopy at that time.     [address] [place]. [address]  You may call [phone] with any questions or concerns.   Central Appointment #: [phone]        Follow Up and recommended labs and tests      Follow up with penitentiary physician.  The following labs/tests are recommended: BMP plus CBC in 1 week.  Follow-up with urology in 2 weeks.  Follow-up with cardiology in 4 to 6 weeks for further medications adjustment.  Follow-up with neurology in 4 to 6 weeks for stroke Follow-up                Unresulted Labs Ordered in the Past 30 Days of this Admission       Date and Time Order Name Status Description    11/1/2024  8:31 PM Blood Culture Peripheral Blood Preliminary          These results will be followed up by     Discharge Disposition   Discharged to short-term care facility  Condition at discharge: Stable    Hospital Course     Chris Bhakta is a 78 year old male with past medical history significant for CAD, CABG (2009), HFpEF, HTN, HLD, CKD stage III, admitted on 11/1/2024 with hematuria and hypotension. He was also found to have acute CVA.   Here are further details regarding his current hospitalization       Hematuria  UTI associated with willis catheter , Proteus Mirabilis ( present on admission )  Hypotension, likely hypovolemic vs septic   Urinary retention s/p willis placement  Pt recently underwent L YELENA for DJD and discharged 10/3 to TCU. Had urinary retention and willis placed. Today noted to have hematuria, also noted hypotension, BP at care facility 77/44 (87/53 in ED initially). WBC 8.5. UA w/ 182 RBC, WBCs, WBC clumps. CXR w/o acute process.   * BPs improved with volume resuscitation. Hematuria cleared  * Urine cultures positive for proteus mirabilis   * CT A/P obtained on 11/2 negative for urinary calculi or hydronephrosis. Benign appearing renal cysts but no other pathology.     -- Patient was admitted for further evaluation and management, received IV ceftriaxone for 3 days  --Patient will be finishing course of Ceftin for another 7 days after discharge   -- patient initially received IV fluids, now has been off, encourage p.o. intake  -- Continue tamsulosin 0.4 mg daily  -- Urology consultation was requested , patient is also requesting to be discharged with proper sized willis usually uses 18 gauge , urology recommended discharging with Willis catheter and voiding trial in couple of weeks to TCU versus in urology office  --Outpatient urology follow-up has been ordered.     Acute CVA  Hx traumatic SAH  Dtr noted pt had slurred speech this week at TCU. Noted to have very dry MM in the ED. CT head in ED unremarkable. MRI w/ tiny acute infarct L occipital  lobe, MRA w/ stenosis R subclavian, dissection not excluded.     -- Stroke neurology consulted, appreciate recommendations   --Recommending single antiplatelet therapy with secondary stroke prevention with recent hemodynamically significant hematuria  -- Baby aspirin was changed to enteric-coated on discharge.  -- For additional risk factors modification, patient has been started on atorvastatin 40 mg p.o. daily  -- Patient was evaluated by physical, occupational and speech therapy, therapies are ordered on discharge as he will be getting discharged back to TCU.  -- Follow-up with neurology in 6 to 8 weeks for hospital follow-up  -- Neurology is recommending gradual reduction to her long-term goal of less than 130/80 blood pressure  -- Holding Entresto for now, okay to resume carvedilol, see below  -- Echo completed during this hospitalization showing LV systolic function of 55 to 60%, moderate concentric LVH, moderate aortic valve sclerosis.  -- Outpatient cardiology follow-up       EBONI, mild  Mild anion gap acidosis: Resolved  Baseline creatinine 0.8-0.9. Creatinine in the ED 1.36. lactic 2.0.   * Cr slightly improved to 1.19 after fluids   --Off IV fluids for past 24 hours  --Encourage patient to continue to increase his oral intake  --Will recommend repeating BMP and CBC in 1 week.     Physical deconditioning  Failure to Thrive   Passive suicidal ideation   Poor PO intake  Moderate malnutrition in context of chronic illness or injury  Pts daughter reports that pt has not been eating or drinking well at TCU. He will often make some passive suicidal ideations stating that he just wants to die and has nothing to live for. No specific plan.     -- Consulted psychiatry this morning  --No concern for active suicidal risk  --Psychiatry has recommended increasing Cymbalta to 30 mg in the a.m. and 60 mg in the evening, updated dose has been ordered on discharge.  --Will recommend outpatient psychiatric follow-up in 8 to  12 weeks.  --Patient has also been evaluated by RD, recommended to be continued on soft and bite-size regular diet with thin liquid.  Patient did consume all of his lunch and dinner yesterday.     Chronic HFpEF  CAD s/p CABG 2009, PCI  HTN  HLD  [Needs rec- coreg 3.125 mg BID, Jardiance 10 mg daily, entresto BID]  Recent echo 10/2024 EF 55-60% w/o WMA.   --Initially on antihypertensive medications were held to allow permissive hypertension.  --Patient is a started back on carvedilol 3.125 mg twice daily  --Patient will be continued on Jardiance 10 mg daily  --Plan to hold Entresto for few weeks as patient hemoglobin and blood pressure continues to improve, Entresto can be slowly started back at TCU or by PCP on follow-up     DM II  Recent A1c 10/2/24 6.2%.   - hold metformin and Jardiance for now   - BID and HS blood sugars     L hip DJD s/p YELENA  Chronic pain syndrome  On chronic narcotics, appears related to cervical spine disease. 10/1 s/p YELENA. Had postoperative hypotension requiring ICU and pressors.     -- TID acetaminophen  -- dilaudid 2-4 mg q4 prn, 12 tablets prescription given on discharge, further management per rehab physician  --As above duloxetine dose is 30 mg in a.m. and 60 mg in evening  -- Physical and Occupational Therapy, resumed on discharge especially in the picture of acute ischemic stroke    Patient was seen and examined on the day of discharge , he is feeling well, does not have any complaints , I did review the discharge medications and instructions with the patient and plan for him to follow up with the PCP after the hospitalization .patient was in agreement , he is discharged in stable condition back to TCU      Consultations This Hospital Stay   NEUROLOGY IP STROKE CONSULT  SPEECH LANGUAGE PATH ADULT IP CONSULT  PHARMACY IP CONSULT  PHARMACY IP CONSULT  PHARMACY IP CONSULT  PHYSICAL THERAPY ADULT IP CONSULT  OCCUPATIONAL THERAPY ADULT IP CONSULT  REHAB ADMISSIONS LIAISON IP CONSULT  CARE  MANAGEMENT / SOCIAL WORK IP CONSULT  CARE MANAGEMENT / SOCIAL WORK IP CONSULT  PSYCHIATRY IP CONSULT  UROLOGY IP CONSULT  PHYSICAL THERAPY ADULT IP CONSULT  OCCUPATIONAL THERAPY ADULT IP CONSULT  SPEECH LANGUAGE PATH ADULT IP CONSULT  SMOKING CESSATION PROGRAM IP CONSULT    Code Status   No CPR- Do NOT Intubate    Time Spent on this Encounter   I, Tiara Nunez MD, personally saw the patient today and spent greater than 30 minutes discharging this patient.       Tiara Nunez MD  [facility] NEUROSCIENCE UNIT  [address] [place] [address]  Phone: [phone]  ______________________________________________________________________    Physical Exam   Vital Signs: Temp: 97.2  F (36.2  C) Temp src: Oral BP: 124/70 Pulse: 78   Resp: 18 SpO2: 98 % O2 Device: None (Room air)    Weight: 166 lbs 7.16 oz    Physical Exam  Vitals and nursing note reviewed.   Constitutional:       Appearance: He is well-developed.   HENT:      Head: Normocephalic and atraumatic.   Eyes:      Pupils: Pupils are equal, round, and reactive to light.   Neck:      Thyroid: No thyromegaly.   Cardiovascular:      Rate and Rhythm: Normal rate and regular rhythm.      Heart sounds: Normal heart sounds.   Pulmonary:      Effort: Pulmonary effort is normal. No respiratory distress.      Breath sounds: Normal breath sounds.   Abdominal:      General: Bowel sounds are normal. There is no distension.      Palpations: Abdomen is soft.   Musculoskeletal:         General: No tenderness. Normal range of motion.      Cervical back: Normal range of motion and neck supple.   Skin:     General: Skin is warm and dry.   Neurological:      Mental Status: He is alert and oriented to person, place, and time.      Comments: Slight dysarthria   Psychiatric:         Behavior: Behavior normal.            Primary Care Physician   Blaise Rodriguez    Discharge Orders      Adult Neurology  Referral      Follow Up ([facility])    Can  follow-up for office voiding trial vs voiding trial at TCU in 10-14 days.   Recommend follow-up with urologist in 3-4wks for recheck urine for hematuria, possible cystoscopy at that time.     [address] [place]. [address]  You may call [phone] with any questions or concerns.   Central Appointment #: [phone]     General info for SNF    Length of Stay Estimate: Short Term Care: Estimated # of Days <30  Condition at Discharge: Improving  Level of care:skilled   Rehabilitation Potential: Good  Admission H&P remains valid and up-to-date: Yes  Recent Chemotherapy: N/A  Use Nursing Home Standing Orders: Yes     Mantoux instructions    Give two-step Mantoux (PPD) Per Facility Policy Yes     Follow Up and recommended labs and tests    Follow up with retirement physician.  The following labs/tests are recommended: BMP plus CBC in 1 week.  Follow-up with urology in 2 weeks.  Follow-up with cardiology in 4 to 6 weeks for further medications adjustment.  Follow-up with neurology in 4 to 6 weeks for stroke Follow-up     Reason for your hospital stay    You were admitted to the hospital secondary to hypotension, you were found to have urinary tract infection from Proteus, you have received 3 days of IV antibiotics.  You were also seen by urology and you are discharged with Diaz catheter.  You will be given voiding trial in TCU or urology office in coming weeks.  For your slurred speech you were also evaluated by stroke neurology and you were found to have a small occipital infarct and you are recommended to continue baby aspirin.  You were also evaluated by psychiatry.     Additional Discharge Instructions    You were admitted to the hospital secondary to hypotension, you were found to have urinary tract infection from Proteus, you have received 3 days of IV antibiotics.  You were also seen by urology and you are discharged with Diaz catheter.  You will be given voiding trial in TCU or urology office  in coming weeks.  For your slurred speech you were also evaluated by stroke neurology and you were found to have a small occipital infarct and you are recommended to continue baby aspirin.  You were also evaluated by psychiatry.  Your Cymbalta dose has been adjusted to be given 30 mg in the morning and 60 mg in the evening.  He will be finishing 7 more days of antibiotics after the discharge.  Your aspirin is changed to enteric-coated.  You are also started on Lipitor 40 mg p.o. daily.At the time of discharge, your Entresto has been stopped due to the concern for low blood pressure.  You might be able to be started back on Entresto in coming weeks once your blood pressure improves and your hemoglobin is improving.  You are recommended to get your BMP and CBC checked in 1 week.     Activity - Up with nursing assistance     Diaz catheter    To straight gravity drainage. Change catheter every 2 weeks and PRN for leaking or decreased urine output with signs of bladder distention. DO NOT change catheter without a specific Provider order IF diagnosis of benign prostatic hypertrophy (BPH), neurogenic bladder, or other urological conditions  Voiding trial in 2 weeks at TCU or at urology office     Encourage PO fluids     No CPR- Do NOT Intubate     Physical Therapy Adult Consult    Evaluate and treat as clinically indicated.    Reason: Acute illness debilitation     Occupational Therapy Adult Consult    Evaluate and treat as clinically indicated.    Reason: Acute illness debilitation     Speech Language Path Adult Consult    Evaluate and treat as clinically indicated.    Reason: Acute ischemic stroke, dysarthria     Fall precautions     Diet    Follow this diet upon discharge: Orders Placed This Encounter      Snacks/Supplements Adult: Ensure Enlive; Between Meals      Soft & Bite Sized Diet (level 6) Thin Liquids (level 0) (Direct supervision during meals due to mild impulsivity. Small sips. Upright position for meals.  Alternate liquids/solids.)       Stroke Hospital Follow Up (for neurologist use only)    [facility] will call you to coordinate care as prescribed by your provider. If you don t hear from a representative within 2 business days, please call [phone].         Significant Results and Procedures   Results for orders placed or performed during the hospital encounter of 11/01/24   XR Chest 2 Views    Narrative    EXAM: XR CHEST 2 VIEWS  LOCATION: [facility]  DATE: 11/1/2024    INDICATION: Hypotension.  COMPARISON: 9/26/2016.      Impression    IMPRESSION: Sternotomy. Normal heart size and pulmonary vascularity. No acute infiltrates or consolidation. Aortic calcification. Old right posterior rib fractures. Fusion hardware at the cervicothoracic junction. Remainder unremarkable. No definite   acute findings.   CT Head w/o Contrast    Narrative    EXAM: CT HEAD W/O CONTRAST  LOCATION: [facility]  DATE: 11/1/2024    INDICATION: Slurred speech for one week  COMPARISON: None.  TECHNIQUE: Routine CT Head without IV contrast. Multiplanar reformats. Dose reduction techniques were used.    FINDINGS:  INTRACRANIAL CONTENTS: No intracranial hemorrhage, extraaxial collection, or mass effect.  No CT evidence of acute infarct. Moderate to severe presumed chronic small vessel ischemic changes. Moderate generalized volume loss. No hydrocephalus.     VISUALIZED ORBITS/SINUSES/MASTOIDS: No intraorbital abnormality. No paranasal sinus mucosal disease. No middle ear or mastoid effusion.    BONES/SOFT TISSUES: No acute abnormality.      Impression    IMPRESSION:  1.  No CT evidence for acute intracranial process.  2.  Brain atrophy and presumed chronic microvascular ischemic changes as above.   MR Brain w/o & w Contrast    Addendum: 11/2/2024    COMMUNICATION ADDENDUM:  Findings were discussed with Dr. Moreno on 11/2/2024 12:54 AM CDT.    END ADDENDUM      Narrative    EXAM:  MR BRAIN W/O and W CONTRAST, MRA BRAIN ([place] OF [place]) W/O CONTRAST, MRA NECK (CAROTIDS) W/O and W CONTRAST  LOCATION: [facility]  DATE: 11/2/2024    INDICATION: slurred speech for 1 week  COMPARISON: Head CT obtained earlier today  CONTRAST: 10mL Gadavist  TECHNIQUE:   1) Routine multiplanar multisequence head MRI without and with intravenous contrast.  2) 3D time-of-flight head MRA without intravenous contrast.  3) Neck MRA without and with IV contrast. Stenosis measurements made according to NASCET criteria unless otherwise specified.    FINDINGS:  HEAD MRI:  Somewhat motion degraded examination.    INTRACRANIAL CONTENTS: Tiny acute infarct involving the periventricular white matter in the left occipital lobe (image 55 series 5 and image 16 series 6). No mass, acute hemorrhage, or extra-axial fluid collections. Small foci of susceptibility artifact   in the pete, bilateral thalami, left occipital lobe, and inferior left cerebellum likely due to old hemorrhage or mineralization. Confluent nonspecific T2/FLAIR hyperintensities within the cerebral white matter and pete most consistent with advanced   chronic microvascular ischemic change. Moderate generalized cerebral atrophy. No hydrocephalus. Normal position of the cerebellar tonsils. No pathologic contrast enhancement.    SELLA: No abnormality accounting for technique.    OSSEOUS STRUCTURES/SOFT TISSUES: Normal marrow signal. The major intracranial vascular flow voids are maintained.     ORBITS: Prior bilateral cataract surgery. Visualized portions of the orbits are otherwise unremarkable.     SINUSES/MASTOIDS: No paranasal sinus mucosal disease. No middle ear or mastoid effusion.     HEAD MRA:   Somewhat motion degraded examination.    ANTERIOR CIRCULATION: No stenosis/occlusion, aneurysm, or high flow vascular malformation. Fetal origin of the left posterior cerebral artery from the anterior circulation. Prominent right posterior  communicating artery is also seen.    POSTERIOR CIRCULATION: No stenosis/occlusion, aneurysm, or high flow vascular malformation. Balanced vertebral arteries supply a normal basilar artery.     NECK MRA:   RIGHT CAROTID: Atherosclerotic plaque results in less than 50% stenosis in the right ICA. No dissection.    LEFT CAROTID: Atherosclerotic plaque results in less than 50% stenosis in the left ICA. No dissection.    VERTEBRAL ARTERIES: No focal stenosis or dissection. Balanced vertebral arteries.    AORTIC ARCH: Classic aortic arch anatomy. Focal high-grade web or shelflike stenosis of the proximal right subclavian artery just beyond the origin.      Impression    IMPRESSION:  HEAD MRI:   1.  Tiny acute infarct periventricular white matter left occipital lobe.  2.  Generalized brain atrophy and presumed microvascular ischemic changes as detailed above.  3.  Foci of old hemorrhage or mineralization brainstem, thalami, left occipital lobe, and left cerebellum.    HEAD MRA:   1.  No aneurysm, high flow AVM or significant stenosis identified.  2.  Variant [place] of [place] anatomy as above.    NECK MRA:  1.  Focal high-grade web or shelflike stenosis proximal right subclavian artery. A dissection is not excluded. Consider CTA for further evaluation.  2.  Atherosclerotic plaque at both carotid bifurcations without hemodynamically significant stenosis.   MRA Neck (Carotids) wo & w Contrast    Addendum: 11/2/2024    COMMUNICATION ADDENDUM:  Findings were discussed with Dr. Moreno on 11/2/2024 12:54 AM CDT.    END ADDENDUM      Narrative    EXAM: MR BRAIN W/O and W CONTRAST, MRA BRAIN ([place] OF [place]) W/O CONTRAST, MRA NECK (CAROTIDS) W/O and W CONTRAST  LOCATION: [facility]  DATE: 11/2/2024    INDICATION: slurred speech for 1 week  COMPARISON: Head CT obtained earlier today  CONTRAST: 10mL Gadavist  TECHNIQUE:   1) Routine multiplanar multisequence head MRI without and with intravenous  contrast.  2) 3D time-of-flight head MRA without intravenous contrast.  3) Neck MRA without and with IV contrast. Stenosis measurements made according to NASCET criteria unless otherwise specified.    FINDINGS:  HEAD MRI:  Somewhat motion degraded examination.    INTRACRANIAL CONTENTS: Tiny acute infarct involving the periventricular white matter in the left occipital lobe (image 55 series 5 and image 16 series 6). No mass, acute hemorrhage, or extra-axial fluid collections. Small foci of susceptibility artifact   in the pete, bilateral thalami, left occipital lobe, and inferior left cerebellum likely due to old hemorrhage or mineralization. Confluent nonspecific T2/FLAIR hyperintensities within the cerebral white matter and pete most consistent with advanced   chronic microvascular ischemic change. Moderate generalized cerebral atrophy. No hydrocephalus. Normal position of the cerebellar tonsils. No pathologic contrast enhancement.    SELLA: No abnormality accounting for technique.    OSSEOUS STRUCTURES/SOFT TISSUES: Normal marrow signal. The major intracranial vascular flow voids are maintained.     ORBITS: Prior bilateral cataract surgery. Visualized portions of the orbits are otherwise unremarkable.     SINUSES/MASTOIDS: No paranasal sinus mucosal disease. No middle ear or mastoid effusion.     HEAD MRA:   Somewhat motion degraded examination.    ANTERIOR CIRCULATION: No stenosis/occlusion, aneurysm, or high flow vascular malformation. Fetal origin of the left posterior cerebral artery from the anterior circulation. Prominent right posterior communicating artery is also seen.    POSTERIOR CIRCULATION: No stenosis/occlusion, aneurysm, or high flow vascular malformation. Balanced vertebral arteries supply a normal basilar artery.     NECK MRA:   RIGHT CAROTID: Atherosclerotic plaque results in less than 50% stenosis in the right ICA. No dissection.    LEFT CAROTID: Atherosclerotic plaque results in less than 50%  stenosis in the left ICA. No dissection.    VERTEBRAL ARTERIES: No focal stenosis or dissection. Balanced vertebral arteries.    AORTIC ARCH: Classic aortic arch anatomy. Focal high-grade web or shelflike stenosis of the proximal right subclavian artery just beyond the origin.      Impression    IMPRESSION:  HEAD MRI:   1.  Tiny acute infarct periventricular white matter left occipital lobe.  2.  Generalized brain atrophy and presumed microvascular ischemic changes as detailed above.  3.  Foci of old hemorrhage or mineralization brainstem, thalami, left occipital lobe, and left cerebellum.    HEAD MRA:   1.  No aneurysm, high flow AVM or significant stenosis identified.  2.  Variant [place] of [place] anatomy as above.    NECK MRA:  1.  Focal high-grade web or shelflike stenosis proximal right subclavian artery. A dissection is not excluded. Consider CTA for further evaluation.  2.  Atherosclerotic plaque at both carotid bifurcations without hemodynamically significant stenosis.   MRA Brain ([place] of [place]) wo Contrast    Addendum: 11/2/2024    COMMUNICATION ADDENDUM:  Findings were discussed with Dr. Moreno on 11/2/2024 12:54 AM CDT.    END ADDENDUM      Narrative    EXAM: MR BRAIN W/O and W CONTRAST, MRA BRAIN ([place] OF [place]) W/O CONTRAST, MRA NECK (CAROTIDS) W/O and W CONTRAST  LOCATION: [facility]  DATE: 11/2/2024    INDICATION: slurred speech for 1 week  COMPARISON: Head CT obtained earlier today  CONTRAST: 10mL Gadavist  TECHNIQUE:   1) Routine multiplanar multisequence head MRI without and with intravenous contrast.  2) 3D time-of-flight head MRA without intravenous contrast.  3) Neck MRA without and with IV contrast. Stenosis measurements made according to NASCET criteria unless otherwise specified.    FINDINGS:  HEAD MRI:  Somewhat motion degraded examination.    INTRACRANIAL CONTENTS: Tiny acute infarct involving the periventricular white matter in the left occipital lobe  (image 55 series 5 and image 16 series 6). No mass, acute hemorrhage, or extra-axial fluid collections. Small foci of susceptibility artifact   in the pete, bilateral thalami, left occipital lobe, and inferior left cerebellum likely due to old hemorrhage or mineralization. Confluent nonspecific T2/FLAIR hyperintensities within the cerebral white matter and pete most consistent with advanced   chronic microvascular ischemic change. Moderate generalized cerebral atrophy. No hydrocephalus. Normal position of the cerebellar tonsils. No pathologic contrast enhancement.    SELLA: No abnormality accounting for technique.    OSSEOUS STRUCTURES/SOFT TISSUES: Normal marrow signal. The major intracranial vascular flow voids are maintained.     ORBITS: Prior bilateral cataract surgery. Visualized portions of the orbits are otherwise unremarkable.     SINUSES/MASTOIDS: No paranasal sinus mucosal disease. No middle ear or mastoid effusion.     HEAD MRA:   Somewhat motion degraded examination.    ANTERIOR CIRCULATION: No stenosis/occlusion, aneurysm, or high flow vascular malformation. Fetal origin of the left posterior cerebral artery from the anterior circulation. Prominent right posterior communicating artery is also seen.    POSTERIOR CIRCULATION: No stenosis/occlusion, aneurysm, or high flow vascular malformation. Balanced vertebral arteries supply a normal basilar artery.     NECK MRA:   RIGHT CAROTID: Atherosclerotic plaque results in less than 50% stenosis in the right ICA. No dissection.    LEFT CAROTID: Atherosclerotic plaque results in less than 50% stenosis in the left ICA. No dissection.    VERTEBRAL ARTERIES: No focal stenosis or dissection. Balanced vertebral arteries.    AORTIC ARCH: Classic aortic arch anatomy. Focal high-grade web or shelflike stenosis of the proximal right subclavian artery just beyond the origin.      Impression    IMPRESSION:  HEAD MRI:   1.  Tiny acute infarct periventricular white matter  left occipital lobe.  2.  Generalized brain atrophy and presumed microvascular ischemic changes as detailed above.  3.  Foci of old hemorrhage or mineralization brainstem, thalami, left occipital lobe, and left cerebellum.    HEAD MRA:   1.  No aneurysm, high flow AVM or significant stenosis identified.  2.  Variant [place] of [place] anatomy as above.    NECK MRA:  1.  Focal high-grade web or shelflike stenosis proximal right subclavian artery. A dissection is not excluded. Consider CTA for further evaluation.  2.  Atherosclerotic plaque at both carotid bifurcations without hemodynamically significant stenosis.   CTA Head Neck with Contrast    Narrative    EXAM: CTA HEAD NECK W CONTRAST  LOCATION: [facility]  DATE: 11/3/2024    INDICATION: CVA, possible R subclavian stenosis  dissection  COMPARISON: MRI 11/1/2024.  CONTRAST: 67mL Isovue 370  TECHNIQUE: Axial helical CT images of the head and neck vessels obtained during the arterial phase of intravenous contrast administration. Axial 2D reconstructed images and multiplanar 3D MIP reconstructed images of the head and neck vessels were   performed by the technologist. Dose reduction techniques were used. All stenosis measurements made according to NASCET criteria unless otherwise specified.    FINDINGS:   HEAD CTA:  ANTERIOR CIRCULATION: No stenosis/occlusion, aneurysm, or high flow vascular malformation. Fetal origin of both posterior cerebral arteries from the anterior circulation.    POSTERIOR CIRCULATION: No stenosis/occlusion, aneurysm, or high flow vascular malformation. Balanced vertebral arteries supply a normal basilar artery.     DURAL VENOUS SINUSES: Expected enhancement of the major dural venous sinuses.    NECK CTA:  RIGHT CAROTID: Atherosclerotic plaque results in less than 50% stenosis in the right ICA. No dissection.    LEFT CAROTID: Atherosclerotic plaque results in less than 50% stenosis in the left ICA. No  dissection.    VERTEBRAL ARTERIES: No focal stenosis or dissection. Balanced vertebral arteries.    AORTIC ARCH: Classic aortic arch anatomy. Calcified and noncalcified plaque involving the origin of the great vessels. Somewhat tortuous brachiocephalic trunk. The brachiocephalic and right subclavian arteries are patent without evidence of high-grade   stenosis or intimal filling defect to suggest dissection.      NONVASCULAR STRUCTURES: Unremarkable.      Impression    IMPRESSION:   HEAD CTA:   1.  No significant stenosis, aneurysm, or high flow vascular malformation.    NECK CTA:  1.  Atherosclerotic disease of the aortic arch great vessels. No evidence of high-grade stenosis or dissection.   CT Abdomen Pelvis w/o Contrast    Narrative    EXAM: CT ABDOMEN PELVIS W/O CONTRAST  LOCATION: [facility]  DATE: 11/3/2024    INDICATION: Haematuria, EBONI, ?stone  COMPARISON: None.  TECHNIQUE: CT scan of the abdomen and pelvis was performed without IV contrast. Multiplanar reformats were obtained. Dose reduction techniques were used.  CONTRAST: None.    FINDINGS:   LOWER CHEST: Moderate scarring and/or fibrosis in both lung bases.    HEPATOBILIARY: Cholelithiasis. Unremarkable noncontrast appearance of the liver.    PANCREAS: Moderate atrophy and fatty replacement.    SPLEEN: Normal.    ADRENAL GLANDS: Normal.    KIDNEYS/BLADDER: No definite urinary calculi. However, the ureterovesical junctions are obscured by artifact from bilateral hip replacements. No hydronephrosis. Multiple benign appearing renal cysts for which no follow-up is recommended. Vascular   calcification in the [place] of both kidneys. A Diaz catheter is present within the bladder.    BOWEL: Bowel loops are normal caliber. Mildly increased stool in the colon. The appendix is not identified. Small amount of peritoneal free fluid in the pelvis.    LYMPH NODES: Normal.    VASCULATURE: Diffuse calcified arterial plaque. No evidence of  aortic aneurysm.    PELVIC ORGANS: The prostate gland is obscured by artifact from bilateral hip replacements.    MUSCULOSKELETAL: Bilateral hip replacements partially obscuring the pelvis. The bones are demineralized. Postoperative and degenerative changes in the lumbar spine.      Impression    IMPRESSION:   1.  No definite urinary calculi or hydronephrosis. However, the ureterovesical junctions are obscured by artifact from bilateral hip replacements.  2.  A Diaz catheter is present within the bladder. No hydronephrosis.  3.  Cholelithiasis.  4.  Moderate scarring and/or fibrosis in both lung bases.     Echocardiogram Complete - For age > 60 yrs    Narrative    [id number]  [id number]  [id number]^LEEANN^HALLIE^ROGELIO     [facility]  Echocardiography Laboratory  [address] [place] [address]     Name: STEVE ECRVANTES  MRN: [id number]  : 1945  Study Date: 2024 11:18 AM  Age: 78 yrs  Gender: Male  Patient Location: [facility]  Reason For Study: Cerebrovascular Incident  Ordering Physician: HALLIE BRADSHAW  Referring Physician: HALLIE BRADSHAW  Performed By: JUNAID Lieberman     BSA: 1.9 m2  Height: 71 in  Weight: 160 lb  HR: 86  BP: 100/60 mmHg  ______________________________________________________________________________  Procedure  Complete Portable Echo Adult. Optison (NDC #[id number]) given intravenously.  ______________________________________________________________________________  Interpretation Summary     Technically difficult study     Grossly normal LV systolic function with LVEF 55 to 60%.Regional wall motion  abnormalities cannot be excluded due to limited visualization.There is  moderate concentric left ventricular hypertrophy.  The right ventricular systolic function is normal.  Moderate aortic valve sclerosis noted     Compared to prior study dated 10/1/2024 no significant  changes  ______________________________________________________________________________  Left Ventricle  The left ventricle is normal in size. There is moderate concentric left  ventricular hypertrophy. Diastolic Doppler findings (E/E' ratio and/or other  parameters) suggest left ventricular filling pressures are indeterminate.  Grossly normal LV systolic function with LVEF 55 to 60%. Regional wall motion  abnormalities cannot be excluded due to limited visualization.     Right Ventricle  The right ventricle is normal size. The right ventricular systolic function is  normal.     Atria  The left atrium is mildly dilated. Right atrial size is normal. There is no  color Doppler evidence of an atrial shunt.     Mitral Valve  There is trace mitral regurgitation.     Tricuspid Valve  Right ventricular systolic pressure could not be approximated due to  inadequate tricuspid regurgitation.     Aortic Valve  The aortic valve is trileaflet with aortic valve sclerosis. No aortic  regurgitation is present. No aortic stenosis is present.     Pulmonic Valve  The pulmonic valve is not well visualized.     Vessels  The aortic root is normal size. IVC diameter <2.1 cm collapsing >50% with  sniff suggests a normal RA pressure of 3 mmHg.     Pericardium  There is no pericardial effusion.     Rhythm  Sinus rhythm was noted.  ______________________________________________________________________________  MMode/2D Measurements & Calculations  IVSd: 1.6 cm     LVIDd: 4.2 cm  LVIDs: 2.3 cm  LVPWd: 1.6 cm  FS: 44.8 %  LV mass(C)d: 280.5 grams  LV mass(C)dI: 146.3 grams/m2  Ao root diam: 3.3 cm  LA dimension: 4.1 cm  LA/Ao: 1.2  LVOT diam: 2.4 cm  LVOT area: 4.4 cm2  Ao root diam index Ht(cm/m): 1.8  Ao root diam index BSA (cm/m2): 1.7  LA Volume (BP): 52.3 ml  LA Volume Index (BP): 27.2 ml/m2  RV Base: 2.6 cm     RWT: 0.77  TAPSE: 1.9 cm     Doppler Measurements & Calculations  MV E max roxane: 98.5 cm/sec  MV dec time: 0.10 sec  PA acc time:  0.09 sec  E/E' avg: 10.7  Lateral E/e': 6.5  Medial E/e': 14.8     ______________________________________________________________________________  Report approved by: Jenna Flores 11/03/2024 12:42 PM             Discharge Medications   Current Discharge Medication List        START taking these medications    Details   aspirin 81 MG EC tablet Take 1 tablet (81 mg) by mouth daily.    Associated Diagnoses: Slurred speech; Acute ischemic stroke (H)      atorvastatin (LIPITOR) 40 MG tablet Take 1 tablet (40 mg) by mouth every evening.    Associated Diagnoses: Slurred speech; Acute ischemic stroke (H)      cefuroxime (CEFTIN) 500 MG tablet Take 1 tablet (500 mg) by mouth 2 times daily for 7 days.    Associated Diagnoses: Slurred speech; Acute ischemic stroke (H)           CONTINUE these medications which have CHANGED    Details   DULoxetine (CYMBALTA) 30 MG capsule Take 1 capsule (30 mg) by mouth every morning AND 2 capsules (60 mg) every evening.    Associated Diagnoses: Slurred speech; Acute ischemic stroke (H)      HYDROmorphone (DILAUDID) 2 MG tablet Take 1 tablet (2 mg) by mouth every 6 hours as needed for moderate pain.  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      bisacodyl (DULCOLAX) 10 MG suppository Place 10 mg rectally daily as needed for constipation.      carvedilol (COREG) 3.125 MG tablet Take 1 tablet (3.125 mg) by mouth 2 times daily (with meals).    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      folic acid (FOLVITE) 1 MG tablet Take 1 mg by mouth daily.      hydrOXYzine HCl (ATARAX) 10 MG tablet Take 1 tablet (10 mg) by mouth  every 6 hours as needed for other (adjuvant pain).  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      metFORMIN (GLUCOPHAGE) 500 MG tablet Take 1 tablet (500 mg) by mouth daily (with breakfast). 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      ondansetron (ZOFRAN ODT) 4 MG ODT tab Take 1 tablet (4 mg) by mouth every 6 hours as needed for nausea or vomiting.  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      tamsulosin (FLOMAX) 0.4 MG 24 hr capsule Take 0.4 mg by mouth every morning      trolamine salicylate (ASPERCREME) 10 % external cream Apply topically daily as needed for moderate pain.      vitamin D3 (CHOLECALCIFEROL) 50 mcg (2000 units) tablet Take 1 tablet by mouth daily.           STOP taking these medications       aspirin (ASA) 81 MG chewable tablet Comments:   Reason for Stopping:         sacubitril-valsartan (ENTRESTO) 24-26 MG per tablet Comments:   Reason for Stopping:             Allergies   Allergies   Allergen Reactions    Iodinated Contrast Media Shortness Of Breath    Atorvastatin Fatigue    Lisinopril Cough    Oxycodone Confusion    Sertraline Dizziness

## 2024-11-04 NOTE — PLAN OF CARE
Goal Outcome Evaluation:                      Pt here with L. Occipital stroke and recent hx of L. Hip arthroplasty. A&O x 3, d/o to situation - states he is here for a broken leg. Neuros LLE 4/5, slurred speech. VSS. Tele SR w/ bundle branch block. Soft bite size diet, thin liquids. Takes pills whole. Up with 2 w/ lift. Pain in leg, PRN dilaudid 2 mg given once this shift. Pt scoring green on the Aggression Stop Light Tool. Plan TCU. Discharge pending. Patient has chronic willis, current one is too small and causes leaking.  Pt wishes to have one of the correct size (18) before discharge but we do not stock them

## 2024-11-04 NOTE — PLAN OF CARE
Pt here with L occipital stroke, UTI. A&Ox3, disorientated to situation. Neuros slurred and garbled speech, LLE 4/5 dt recent hip surgery. VSS, SBP <220. Tele NSR. Soft and bite sized diet, thin liquids. Takes pills whole. Up with lift. Denies pain. Chronic willis in place with chris output. Pt scoring green on the Aggression Stop Light Tool. Plan continue therapies. Discharge pending.

## 2024-11-04 NOTE — PROGRESS NOTES
Reason for Admission: Acute ischemic stroke L occipital Region, hematuria, hypotension     Cognitive/Mentation: A/Ox 3  Neuros/CMS: Intact ex disoriented to situation, forgetful, intermittent confusion, slurred speech, gen weakness    VS: stable on RA .   Tele: sr.  /GI: Incon willis patent no leaking this shift   Pulmonary: LS clear .  Pain: HA, scheduled tylenol given .     Drains/Lines: PIV sl   Skin: scattered sabs, bruising and L hip incision cdi   Activity: Assist x 2 with ss.  Diet: Soft and bite sized with thin liquids. Takes pills whole one at a time .     Therapies recs: Pending  Discharge: tcu pending     Aggression Stoplight Tool: Green

## 2024-11-04 NOTE — DISCHARGE INSTRUCTIONS
Your risk factors for stroke or TIA (transient ischemic attack):     Your Risk Factors Your Results Goals   [] High blood pressure BP: 124/70 (24 1200) Less than 120/80   [x] Cholesterol          Total 2024: 118 mg/dL   Less than 150    Triglycerides   2024: 241 mg/dL Less than 150    LDL 2024: 51 mg/dL    Less than 70    HDL 2024: 19 mg/dL         Greater than 40 (men)  Greater than 50 (women)   [x] Diabetes                A1C No lab value available in past 30 days Less than 5.7   [] Atrial fibrillation Atrial fibrillation noted on cardiac monitoring Manage per physician orders   [] Smoking/tobacco use   Tobacco Use      Smoking status: Former        Packs/day: 0.00        Years: 0.5 packs/day for 53.9 years (26.9 ttl pk-yrs)        Types: Cigarettes        Start date: 1970        Quit date: 2024        Years since quittin.2      Smokeless tobacco: Never   Quit smoking and tobacco   [] Overweight Body mass index is 23.21 kg/m .  Less than 25     Other risk factors include: carotid (neck) artery disease, other heart diseases, prior stroke or TIA, poor diet, lack of exercise, and excessive alcohol consumption.     [x] Written stroke educational materials given to patient and family including:   - Learning about BE FAST: Stroke Warning Signs and Learning about Risk Factors for Stroke (Healthwise)   - Understanding Stroke: Key Resources After a Stroke (FOD #230269)       Know the warning signs and symptoms of stroke: BE FAST     B = Balance loss   E = Eyesight changes   F = Facial droop or numbness   A = Arm or leg weakness   S = Speech difficulty, slurred speech   T = Time to call 911 for help

## 2024-11-04 NOTE — TELEPHONE ENCOUNTER
Health Call Center    Phone Message    May a detailed message be left on voicemail: no     Reason for Call: Appointment Intake    Referring Provider Name: Td Rothman MD  Diagnosis and/or Symptoms: Slurred speech     Provider entered 2 referrals. Stroke Hosp F/U referral with instructions to schedule with IMER or General.    The other referral is Neurology  referral with instructions to schedule with Stroke.    Please review and contact the patient to discuss scheduling options.     Action Taken: Message routed to:  Other: [facility] Neurology    Travel Screening: Not Applicable     Date of Service:

## 2024-11-04 NOTE — PLAN OF CARE
Goal Outcome Evaluation:      Plan of Care Reviewed With: patient, child      Reason for Admission: Hypotension, UTI with hematuria, and L occipital CVA.    Cognitive/Mentation: A/Ox 3, disoriented to situation.   Neuros/CMS: Intact ex slurred speech, otherwise grossly intact.  VS: WDL on RA.   Tele: SR with a BBB.  /GI: Diaz intact with straw colored urine.  Diaz is leaking a small amt from urethral meatus. Plan for trial of void at TCU or urology follow up appointment.   Pulmonary: LS diminished.  Pain: max 5/10 hips and knees, decreased with scheduled tylenol.     Drains/Lines: NA  Skin: scattered bruising/scabs.  Activity: Assist x 2 with lift.  Pt did not want to get out of bed - education provided re the importance of ambulating/changing positions, but pt declined.  Diet: soft bite sized with thin liquids. Takes pills whole.     Therapies recs: TCU  [facility]    Aggression Stoplight Tool: green    End of shift summary: Neuro exam unchanged. Reviewed written discharge instructions with pt's daughter by phone, then with pt in his room.  All questions answered.

## 2024-11-04 NOTE — CONSULTS
[facility] Inpatient Consultation Note    Chris Bhakta MRN# [id number]   Age: 78 year old YOB: 1945     Date of Admission:  11/1/2024    Reason for consult: Urinary retention, hematuria        Requesting physician: Dr. Nunez                 History of Present Illness:   Pt is an 78M who is admitted with hematuria and hypotension. Had willis placed on prior admission for postop YELENA, failed voiding trial, last month. UCx from this admission growing proteus. Non contrast CT was also obtained this admission, willis appropriately placed and no concerning upper tract findings; difficult to assess bladder d/t artifact. He denies any prior urologic hx but does endorse that he has seen blood in his urine in the past- he does not provide further details on this. Willis currently draining clear yellow urine this AM.     From admission H&P: Chris Bhakta is a 78 year old male who presents with hematuria possible slurred speech.  He has history of coronary disease, chronic diastolic heart failure, diabetes, chronic pain among others.  He was recently hospitalized in early October for a left total hip arthroplasty for DJD.  He was discharged on October 3 to the TCU.  At some point he had a Willis catheter placed for urinary retention.  Today care facility noted that he had hematuria.  They also checked his blood pressure and found it to be in the 70s systolic.  He was promptly sent to the emergency department.  His daughter also had noted that she thought it might of had slurred speech over the last couple of days.  On my evaluation he is comfortable.  He may be dysarthric but he also has very dry mouth so it is difficult to assess.  He denies any pain complaints other than his hips and his knees although currently those are not in pain.  He denies chest pain or shortness of breath.  Denies abdominal pain.  Denies vision changes.  Denies upper or lower extremity weakness although does not sound  like his walked very much.           Past Medical History:     Past Medical History:   Diagnosis Date    Anxiety disorder     BPH (benign prostatic hyperplasia)     CAD (coronary artery disease)     Cardiomyopathy (H)     Cataract     Cervical cord myelomalacia (H)     CHF (congestive heart failure) (H)     Chronic ischemic heart disease     Chronic pain syndrome with opioid dependence     CKD (chronic kidney disease) stage 3, GFR 30-59 ml/min (H)     Closed comminuted intertrochanteric fracture of proximal end of left femur (H)     Closed fracture of neck of left femur (H)     Congenital multiple renal cysts     Cyst of right kidney     Dysarthria     Dyslipidemia     Dysphagia     Dyspnea     Exudative age-related macular degeneration of right eye with inactive choroidal neovascularization (H)     Femur fracture, left 09/2024    surgery in october    Folic acid deficiency     GERD (gastroesophageal reflux disease)     Hyperglycemia     Hyperlipidemia     Hypertension     Insomnia     Macular degeneration     MDD (major depressive disorder)     Mixed hyperlipidemia     Obesity     RAYMOND (obstructive sleep apnea)     Osteoarthritis     Osteoporosis     Pathological fracture of left femur due to age-related osteoporosis (H)     Primary osteoarthritis of right knee     PVD (peripheral vascular disease) (H)     Renal insufficiency syndrome     SAH (subarachnoid hemorrhage) (H)     Somnolence     Spinal stenosis     TBI (traumatic brain injury) (H)     Toxic encephalopathy     Type 2 diabetes mellitus (H)     Urinary incontinence              Past Surgical History:     Past Surgical History:   Procedure Laterality Date    ARTHROPLASTY HIP Left 10/1/2024    Procedure: LEFT TOTAL HIP ARTHROPLASTY;  Surgeon: Kathryn Nicole MD;  Location: [facility] OR    CARPAL TUNNEL RELEASE      CERVICAL FUSION      CERVICAL SPINE SURGERY  2009    X2    CORONARY ARTERY BYPASS GRAFT  2003    KIDNEY CYST REMOVAL      LUMBAR DECOMPRESSION L3-S1   2010    LUMBAR SPINE SURGERY  2009    PTCA OF LAD (FOR FAILED GRAFT FAILURE)  2004    REMOVE HARDWARE HIP NAILING Left 10/1/2024    Procedure: LEFT FEMORAL INTRAMEDULARY NAIL REMOVAL;  Surgeon: Kathryn Nicole MD;  Location: [facility] OR    TOTAL HIP ARTHROPLASTY Right 2020    ULNAR TUNNEL RELEASE  2012             Social History:     Social History     Socioeconomic History    Marital status:      Spouse name: Not on file    Number of children: Not on file    Years of education: Not on file    Highest education level: Not on file   Occupational History    Not on file   Tobacco Use    Smoking status: Former     Current packs/day: 0.00     Average packs/day: 0.5 packs/day for 53.9 years (26.9 ttl pk-yrs)     Types: Cigarettes     Start date: 1970     Quit date: 2024     Years since quittin.2    Smokeless tobacco: Never   Vaping Use    Vaping status: Never Used   Substance and Sexual Activity    Alcohol use: Not Currently     Comment: rare    Drug use: Not on file    Sexual activity: Not on file   Other Topics Concern    Not on file   Social History Narrative    Not on file     Social Drivers of Health     Financial Resource Strain: Low Risk  (10/5/2024)    Financial Resource Strain     Within the past 12 months, have you or your family members you live with been unable to get utilities (heat, electricity) when it was really needed?: No   Food Insecurity: Low Risk  (10/5/2024)    Food Insecurity     Within the past 12 months, did you worry that your food would run out before you got money to buy more?: No     Within the past 12 months, did the food you bought just not last and you didn t have money to get more?: No   Transportation Needs: Low Risk  (10/5/2024)    Transportation Needs     Within the past 12 months, has lack of transportation kept you from medical appointments, getting your medicines, non-medical meetings or appointments, work, or from getting things that you need?: No    Physical Activity: Not on file   Stress: Not on file   Social Connections: Not on file   Interpersonal Safety: High Risk (11/2/2024)    Interpersonal Safety     Do you feel physically and emotionally safe where you currently live?: No     Within the past 12 months, have you been hit, slapped, kicked or otherwise physically hurt by someone?: No     Within the past 12 months, have you been humiliated or emotionally abused in other ways by your partner or ex-partner?: No   Housing Stability: Low Risk  (10/5/2024)    Housing Stability     Do you have housing? : Yes     Are you worried about losing your housing?: No             Family History:   No family history on file.          Immunizations:     Immunization History   Administered Date(s) Administered    COVID-19 Monovalent 18+ (Moderna) 03/04/2021, 03/30/2021, 11/11/2021    Flu 65+ (Fluad) 10/12/2020    Flu, Unspecified 12/12/2006, 01/11/2008, 10/11/2009    Hepatitis B, Adult 08/29/2019    Influenza (H1N1) 01/08/2010    Influenza (High Dose) Trivalent,PF (Fluzone) 09/12/2014, 10/26/2015, 09/30/2016, 11/10/2017, 09/13/2018, 10/08/2019    Influenza (IIV3) PF 10/06/2010, 11/16/2012    Influenza Vaccine 65+ (FLUAD) 10/12/2020, 11/30/2021    Influenza Vaccine >6 months,quad, PF 09/09/2013    Influenza Vaccine IM Ages 6-35 Months 4 Valent (PF) 09/09/2013    Influenza, seasonal, injectable, PF 12/12/2006, 01/11/2008, 10/11/2009, 10/06/2010, 11/04/2011, 11/16/2012    Pneumo Conj 13-V (2010&after) 10/27/2014    Pneumococcal 23 valent 06/10/2005, 11/10/2017    TDAP (Adacel,Boostrix) 10/26/2015    Td (Adult), Adsorbed 06/01/1995, 06/10/2005             Allergies:     Allergies   Allergen Reactions    Iodinated Contrast Media Shortness Of Breath    Atorvastatin Fatigue    Lisinopril Cough    Oxycodone Confusion    Sertraline Dizziness             Medications:     Current Facility-Administered Medications   Medication Dose Route Frequency Provider Last Rate Last Admin     acetaminophen (TYLENOL) tablet 1,000 mg  1,000 mg Oral or Feeding Tube TID Anton Moreno MD   1,000 mg at 11/04/24 0949    aspirin EC tablet 81 mg  81 mg Oral Daily Anton Moreno MD   81 mg at 11/04/24 0949    Or    aspirin (ASA) chewable tablet 81 mg  81 mg Oral or NG Tube Daily Anton Moreno MD   81 mg at 11/03/24 0821    aspirin (ASA) Suppository 75 mg  75 mg Rectal Daily Bravo Frias MD        atorvastatin (LIPITOR) tablet 40 mg  40 mg Oral QPM Td Rothman MD   40 mg at 11/03/24 2047    [Held by provider] carvedilol (COREG) tablet 3.125 mg  3.125 mg Oral BID w/meals Anton Moreno MD        cefTRIAXone (ROCEPHIN) 1 g vial to attach to  mL bag for ADULTS or NS 50 mL bag for PEDS  1 g Intravenous Q24H Anton Moreno MD   1 g at 11/03/24 2108    [START ON 11/5/2024] DULoxetine (CYMBALTA) DR capsule 30 mg  30 mg Oral or Feeding Tube QAM Jesus Jade PA-C        And    DULoxetine (CYMBALTA) DR capsule 60 mg  60 mg Oral or Feeding Tube QPM Jesus Jade PA-C        HYDROmorphone (DILAUDID) tablet 2-4 mg  2-4 mg Oral Q4H PRN Anton Moreno MD   2 mg at 11/04/24 0354    hydrOXYzine HCl (ATARAX) tablet 10 mg  10 mg Oral Q6H PRN Anton Moreno MD   10 mg at 11/02/24 2233    latanoprost (XALATAN) 0.005 % ophthalmic solution 1 drop  1 drop Both Eyes At Bedtime Anton Moreno MD   1 drop at 11/03/24 2112    lidocaine (LMX4) cream   Topical Q1H PRN Anton Moreno MD        lidocaine (XYLOCAINE) 2 % external gel   Urethral Once Dinora Galaviz MD        lidocaine 1 % 0.1-1 mL  0.1-1 mL Other Q1H PRN Anton Moreno MD        naloxone (NARCAN) injection 0.2 mg  0.2 mg Intravenous Q2 Min PRN Anton Moreno MD        Or    naloxone (NARCAN) injection 0.4 mg  0.4 mg Intravenous Q2 Min PRN Anton Moreno MD        Or    naloxone (NARCAN) injection 0.2 mg  0.2 mg Intramuscular  "Q2 Min PRN Anton Moreno MD        Or    naloxone (NARCAN) injection 0.4 mg  0.4 mg Intramuscular Q2 Min PRN Anton Moreno MD        ondansetron (ZOFRAN ODT) ODT tab 4 mg  4 mg Oral Q6H PRN Anton Moreno MD        Or    ondansetron (ZOFRAN) injection 4 mg  4 mg Intravenous Q6H PRN Anton Moreno MD        polyethylene glycol (MIRALAX) Packet 17 g  17 g Oral or NG Tube Daily Anton Moreno MD   17 g at 11/04/24 0948    [Held by provider] sacubitril-valsartan (ENTRESTO) 24-26 MG per tablet 1 tablet  1 tablet Oral BID Anton Moreno MD        senna-docusate (SENOKOT-S/PERICOLACE) 8.6-50 MG per tablet 2 tablet  2 tablet Oral or Feeding Tube BID Anton Moreno MD   2 tablet at 11/04/24 0949    sodium chloride (PF) 0.9% PF flush 3 mL  3 mL Intracatheter Q8H Anton Moreno MD   3 mL at 11/04/24 0951    sodium chloride (PF) 0.9% PF flush 3 mL  3 mL Intracatheter q1 min prn Anton Moreno MD        tamsulosin (FLOMAX) capsule 0.4 mg  0.4 mg Oral QAM Anton Moreno MD   0.4 mg at 11/04/24 0949    traZODone (DESYREL) half-tab 25 mg  25 mg Oral At Bedtime PRN Dinora Galaviz MD        vitamin D3 (CHOLECALCIFEROL) tablet 50 mcg  50 mcg Oral Daily Anton Moreno MD   50 mcg at 11/04/24 0949             Review of Systems:   Comprehensive review of systems from the Admission note dated 11/1/24 at [facility] was reviewed with no changes except per HPI.     Examination:  /62 (BP Location: Left arm)   Pulse 84   Temp 96.8  F (36  C) (Oral)   Resp 18   Ht 1.803 m (5' 11\")   Wt 75.5 kg (166 lb 7.2 oz)   SpO2 96%   BMI 23.21 kg/m    General: Alert, no distress  HEENT: Face symmetric, mucous membranes moist and pink  Eyes: No scleral icterus  Neck: Symmetric  Chest wall: Symmetric  Respiratory: Breathing unlabored, no audible wheezing  Cardiac: Extremities warm and well perfused   Abdomen: " soft, non tender, non distended   : willis draining yellow urine   Extremities: No evidence of deformities or trauma  Skin: No evident rashes or lesions            Data:     Lab Results   Component Value Date    WBC 6.6 11/03/2024    WBC 7.5 09/27/2016     Lab Results   Component Value Date    RBC 3.40 11/03/2024    RBC 4.94 09/27/2016     Lab Results   Component Value Date    HGB 11.4 11/03/2024    HGB 16.0 09/27/2016     Lab Results   Component Value Date    HCT 33.6 11/03/2024    HCT 45.6 09/27/2016     Lab Results   Component Value Date     11/03/2024     09/27/2016     Creatinine   Date Value Ref Range Status   11/03/2024 1.19 (H) 0.67 - 1.17 mg/dL Final   09/27/2016 1.09 0.66 - 1.25 mg/dL Final   ]  Lab Results   Component Value Date    BUN 32.4 11/03/2024    BUN 22 09/27/2016       Imaging:  EXAM: CT ABDOMEN PELVIS W/O CONTRAST  LOCATION: [facility]  DATE: 11/3/2024     INDICATION: Haematuria, EBONI, ?stone  COMPARISON: None.  TECHNIQUE: CT scan of the abdomen and pelvis was performed without IV contrast. Multiplanar reformats were obtained. Dose reduction techniques were used.  CONTRAST: None.     FINDINGS:   LOWER CHEST: Moderate scarring and/or fibrosis in both lung bases.     HEPATOBILIARY: Cholelithiasis. Unremarkable noncontrast appearance of the liver.     PANCREAS: Moderate atrophy and fatty replacement.     SPLEEN: Normal.     ADRENAL GLANDS: Normal.     KIDNEYS/BLADDER: No definite urinary calculi. However, the ureterovesical junctions are obscured by artifact from bilateral hip replacements. No hydronephrosis. Multiple benign appearing renal cysts for which no follow-up is recommended. Vascular   calcification in the lenka of both kidneys. A Willis catheter is present within the bladder.     BOWEL: Bowel loops are normal caliber. Mildly increased stool in the colon. The appendix is not identified. Small amount of peritoneal free fluid in the pelvis.     LYMPH  NODES: Normal.     VASCULATURE: Diffuse calcified arterial plaque. No evidence of aortic aneurysm.     PELVIC ORGANS: The prostate gland is obscured by artifact from bilateral hip replacements.     MUSCULOSKELETAL: Bilateral hip replacements partially obscuring the pelvis. The bones are demineralized. Postoperative and degenerative changes in the lumbar spine.                                                                      IMPRESSION:   1.  No definite urinary calculi or hydronephrosis. However, the ureterovesical junctions are obscured by artifact from bilateral hip replacements.  2.  A Willis catheter is present within the bladder. No hydronephrosis.  3.  Cholelithiasis.  4.  Moderate scarring and/or fibrosis in both lung bases.    Impression:  78M with urinary retention, UTI, hematuria     Plan:  Cont current willis at discharge  Cont flomax   Abx per IM, culture informed   Can have TOV as outpatient in the coming weeks in office, or at TCU   Recommend establishing care with urology for urine recheck and cystoscopy as further hematuria workup at some point, AVS updated     Urology will sign off for now.   Please call with any questions or concerns.         Geovanna Xiong PA-C  [facility]   [email or web address]  Text Page (7am to 5pm)

## 2024-11-04 NOTE — PLAN OF CARE
Physical Therapy: Orders received. Chart reviewed and discussed with care team.? Physical Therapy not indicated due to per OT evaluation, recommended that pt return to TCU for further IP PT/OT. [facility] today. Recommend continued IP PT/OT at TCU in order to further address deficits.? Defer discharge recommendations to OT and medical team.? Will complete orders.

## 2024-11-04 NOTE — PROGRESS NOTES
Care Management Discharge Note    Discharge Date: 11/05/2024       [facility]  Discharge Services: Transportation, therapies  Discharge Transportation: stretcher    Private pay costs discussed: Not applicable    Does the patient's insurance plan have a 3 day qualifying hospital stay waiver?  No    PAS Confirmation Code:  does not need for return to TCU    Education Provided on the Discharge Plan: yes  Persons Notified of Discharge Plans: daughter, facility  Patient/Family in Agreement with the Plan: yes    Handoff Referral Completed: No, handoff not indicated or clinically appropriate    Additional Information:  SHEILA spoke with hospitalist and patient is nearly medically ready to leave but needs to see psychiatry and urology. SHEILA called to update [facility] and we will plan for discharge tomorrow morning around 1000. SW reviewed chart and neuro has signed off, as well as psych and urology, so patient should be able to leave. [facility] is scheduled for a pickup today between [phone] to return to [facility] TCU. PCS completed by hand and faxed, as patient is unable to sit up in a moving vehicle due to poor sitting balance, poor trunk support.    ADD: 1115  Received a call from patient's [facility] care coordinator Kandy at [phone] wanting to verify if info in Epic is accurate and patient is leaving today. Plan currently not verified. SHEILA called daughter Pushpa and she said she was told by the doctor that patient was not leaving today. SHEILA messaged hospitalist about discharge plan and she will call family and complete orders.     Orders faxed to facility.    Genny Mcneil, JOAN, LGSW   Social Work   [facility]

## 2024-11-04 NOTE — CONSULTS
Care Management is currently following this Pt.   Please see Consult and follow up notes.     Deneen Cross RN, BSN, Care Coordinator

## 2024-11-05 DIAGNOSIS — I63.9 ACUTE ISCHEMIC STROKE (H): ICD-10-CM

## 2024-11-05 DIAGNOSIS — R47.81 SLURRED SPEECH: ICD-10-CM

## 2024-11-05 DIAGNOSIS — Z96.642 HISTORY OF REVISION OF TOTAL REPLACEMENT OF LEFT HIP JOINT: ICD-10-CM

## 2024-11-05 RX ORDER — HYDROMORPHONE HYDROCHLORIDE 2 MG/1
2 TABLET ORAL EVERY 6 HOURS PRN
Qty: 30 TABLET | Refills: 0 | Status: SHIPPED | OUTPATIENT
Start: 2024-11-05 | End: 2024-11-14

## 2024-11-05 NOTE — PROGRESS NOTES
Speech Language Therapy Discharge Summary    Reason for therapy discharge:    Discharged to transitional care facility.    Progress towards therapy goal(s). See goals on Care Plan in [facility] electronic health record for goal details.  Goals not met.  Barriers to achieving goals:   discharge from facility.    Therapy recommendation(s):    See below         11/02/24 3256   SLP Discharge Planning   SLP Plan diet tolerance, adat, strategy training. ? need for motor speech eval/tx.   SLP Discharge Recommendation Transitional Care Facility  (back to prior TCU)   SLP Rationale for DC Rec Suspect swallow goals will be met during hospitalization.   SLP Brief overview of current status  Recommend Soft/bite sized solids and thin liquids. Direct supervision during meals due to mild impulsivity. Small sips. Upright position for meals. Alternate liquids/solids. SLP will follow.

## 2024-11-05 NOTE — PROGRESS NOTES
"[facility] GERIATRICS    Chief Complaint   Patient presents with    Hospital F/U     HPI:  Chris Bhakta is a 78 year old  (1945), who is being seen today for an episodic care visit at: [facility] [25]. Today's concern is:   Patient hospitalized 11/1-11/4/24 d/t hypotension found to have Willis catheter associated UTI proteus mirabilis  Tx with Ceftin will continue for 7 more days, IVF with improvement in hypotension. Urology consulted, patient will discharge with willis catheter, TOV in TCU, f/u with urology as OP.   Urinary retention, continues with willis  Left hip DJD s/p YELENA on exam today patient is resting in bed, alert, pleasant, states he has pain in legs, knees and hips bilaterally, unable to rate pain at time of exam  In therapy patient is working on strengthening and stand pivot transfers  CAD/HTN/CKD: BP  114/70 no further BP available after readmission with HR 70 range, denies CP, palpitations, SOB   Admission weight was 175lbs and current weight is 165.7lbs  Anemia Hgb see labs  Constipation no concerns noted         Allergies, and PMH/PSH reviewed in EPIC today.  REVIEW OF SYSTEMS:  10 point ROS of systems including Constitutional, Eyes, Respiratory, Cardiovascular, Gastroenterology, Genitourinary, Integumentary, Musculoskeletal, Psychiatric were all negative except for pertinent positives noted in my HPI.    Objective:   /70   Pulse 69   Temp 97.2  F (36.2  C)   Resp 16   Ht 1.803 m (5' 11\")   Wt 75.3 kg (166 lb)   SpO2 96%   BMI 23.15 kg/m    GENERAL APPEARANCE:  Alert, in no distress  ENT:  Mouth and posterior oropharynx normal, moist mucous membranes, [place]  EYES:  EOM, conjunctivae, lids, pupils and irises normal, PERRL  RESP:  respiratory effort and palpation of chest normal, lungs clear to auscultation , no respiratory distress  CV:  regular rate and rhythm, no murmur, rub, or gallop, peripheral edema trace+ in LE bilaterally  ABDOMEN:  normal bowel " sounds, soft, nontender, no hepatosplenomegaly or other masses  M/S:   patient resting in bed  SKIN:  Inspection of skin and subcutaneous tissue baseline  NEURO:   speech wnl  PSYCH:  affect and mood normal    Recent labs in [facility] reviewed by me today.  and Most Recent 3 CBC's:  Recent Labs   Lab Test 11/03/24  1037 11/01/24  2058 10/05/24  0719   WBC 6.6 8.5 8.6   HGB 11.4* 11.9* 13.4   MCV 99 99 100    180 169     Most Recent 3 BMP's:  Recent Labs   Lab Test 11/03/24  1037 11/02/24  2206 11/02/24  0834 11/02/24  0340 11/01/24 2058     --  136  --  132*   POTASSIUM 4.5  --  4.0  --  3.9   CHLORIDE 104  --  102  --  97*   CO2 19*  --  19*  --  17*   BUN 32.4*  --  40.5*  --  47.6*   CR 1.19*  --  1.33*  --  1.36*   ANIONGAP 15  --  15  --  18*   VIVIAN 9.4  --  9.4  --  9.7   * 204* 183*   < > 261*    < > = values in this interval not displayed.       Assessment/Plan:  (N39.0) UTI associated with willis catheter  (R33.9) urinary retention  Acute/ongoing,  Hospitalized 11/1-11/4/24 for hypotension and UTI  Plan: continue ceftin 500mg BID for 7 days,  flomax 0.4mg QD, f/u with urology as directed,  willis cares, will attempt TOV once patient is more ambulatory    (M16.12) Primary osteoarthritis of left hip  (primary encounter diagnosis)  (R53.81) Physical deconditioning  Comment: acute/ongoing s/p left YELENA 10/1/24  No change  Plan: PT and OT, tylenol 500mg TID, dilaudid 2-4mg q 4 hours prn, hydroxyzine 10mg q 6 hours prn,   Finished Lovenox  ASA 81mg QD  F/u with ortho as directed     (I25.10) Coronary artery disease involving native coronary artery of native heart without angina pectoris  (I50.32) Chronic diastolic heart failure (H)  (I10) Benign essential hypertension  (N18.30) Stage 3 chronic kidney disease, unspecified whether stage 3a or 3b CKD (H)  Comment: acute/ongoing, no change   Required ICU stay for hypotension/shock post op, elevated troponin, no ischemic findings per cardiology EF of  60%  Plan: BMP follow,  No further lasix  continue coreg to 3.125mg BID  Continue entresto 24-26mg BID, jardiance 10mg QD     (D62) Anemia due to blood loss, acute  Comment: acute/ongoing  Hgb 11.9 on 10/10/24, hgb 12 on 10/28/24  Plan: Hgb follow     (K59.03) Drug-induced constipation  Comment: acute/ongoing  Plan: continue senna s 2 PO BID, continue miralax 17gm QD prn            MED REC REQUIRED  Post Medication Reconciliation Status: medication reconcilation previously completed during another office visit      Orders:  CBC and BMP on Monday    Total time with patient visit: 48 minutes including discussions about the POC and care coordination with the patient. Greater than 50% of total time spent with counseling and coordinating care due to reviewed internal medicine and urology progress notes, medication changes and lab results. Discussed hospitalization and POC with patient at bedside, discussed POC with nursing at facility.   Electronically signed by: Tonya Lynn Haase, APRN CNP

## 2024-11-06 ENCOUNTER — TRANSITIONAL CARE UNIT VISIT (OUTPATIENT)
Dept: GERIATRICS | Facility: CLINIC | Age: 79
End: 2024-11-06
Payer: COMMERCIAL

## 2024-11-06 VITALS
OXYGEN SATURATION: 96 % | DIASTOLIC BLOOD PRESSURE: 70 MMHG | BODY MASS INDEX: 23.24 KG/M2 | HEART RATE: 69 BPM | SYSTOLIC BLOOD PRESSURE: 114 MMHG | TEMPERATURE: 97.2 F | WEIGHT: 166 LBS | HEIGHT: 71 IN | RESPIRATION RATE: 16 BRPM

## 2024-11-06 DIAGNOSIS — N18.30 STAGE 3 CHRONIC KIDNEY DISEASE, UNSPECIFIED WHETHER STAGE 3A OR 3B CKD (H): ICD-10-CM

## 2024-11-06 DIAGNOSIS — T83.511D URINARY TRACT INFECTION ASSOCIATED WITH INDWELLING URETHRAL CATHETER, SUBSEQUENT ENCOUNTER: Primary | ICD-10-CM

## 2024-11-06 DIAGNOSIS — N39.0 URINARY TRACT INFECTION ASSOCIATED WITH INDWELLING URETHRAL CATHETER, SUBSEQUENT ENCOUNTER: Primary | ICD-10-CM

## 2024-11-06 DIAGNOSIS — M16.12 PRIMARY OSTEOARTHRITIS OF LEFT HIP: ICD-10-CM

## 2024-11-06 DIAGNOSIS — D62 ANEMIA DUE TO BLOOD LOSS, ACUTE: ICD-10-CM

## 2024-11-06 DIAGNOSIS — I63.9 ACUTE ISCHEMIC STROKE (H): ICD-10-CM

## 2024-11-06 DIAGNOSIS — I25.10 CORONARY ARTERY DISEASE INVOLVING NATIVE CORONARY ARTERY OF NATIVE HEART WITHOUT ANGINA PECTORIS: ICD-10-CM

## 2024-11-06 DIAGNOSIS — K59.03 DRUG-INDUCED CONSTIPATION: ICD-10-CM

## 2024-11-06 DIAGNOSIS — I50.32 CHRONIC DIASTOLIC HEART FAILURE (H): ICD-10-CM

## 2024-11-06 DIAGNOSIS — I10 BENIGN ESSENTIAL HYPERTENSION: ICD-10-CM

## 2024-11-06 DIAGNOSIS — R53.81 PHYSICAL DECONDITIONING: ICD-10-CM

## 2024-11-06 DIAGNOSIS — R33.9 URINARY RETENTION: ICD-10-CM

## 2024-11-06 LAB — BACTERIA BLD CULT: NO GROWTH

## 2024-11-06 PROCEDURE — 99310 SBSQ NF CARE HIGH MDM 45: CPT | Performed by: NURSE PRACTITIONER

## 2024-11-06 NOTE — LETTER
" 11/6/2024      Chris Bhakta  [address] [place] [address]        [facility] GERIATRICS    Chief Complaint   Patient presents with     Hospital F/U     HPI:  Chris Bhakta is a 78 year old  (1945), who is being seen today for an episodic care visit at: [facility]) [25]. Today's concern is:   Patient hospitalized 11/1-11/4/24 d/t hypotension found to have Willis catheter associated UTI proteus mirabilis  Tx with Ceftin will continue for 7 more days, IVF with improvement in hypotension. Urology consulted, patient will discharge with willis catheter, TOV in TCU, f/u with urology as OP.   Urinary retention, continues with willis  Left hip DJD s/p YELENA on exam today patient is resting in bed, alert, pleasant, states he has pain in legs, knees and hips bilaterally, unable to rate pain at time of exam  In therapy patient is working on strengthening and stand pivot transfers  CAD/HTN/CKD: BP  114/70 no further BP available after readmission with HR 70 range, denies CP, palpitations, SOB   Admission weight was 175lbs and current weight is 165.7lbs  Anemia Hgb see labs  Constipation no concerns noted         Allergies, and PMH/PSH reviewed in [facility] today.  REVIEW OF SYSTEMS:  10 point ROS of systems including Constitutional, Eyes, Respiratory, Cardiovascular, Gastroenterology, Genitourinary, Integumentary, Musculoskeletal, Psychiatric were all negative except for pertinent positives noted in my HPI.    Objective:   /70   Pulse 69   Temp 97.2  F (36.2  C)   Resp 16   Ht 1.803 m (5' 11\")   Wt 75.3 kg (166 lb)   SpO2 96%   BMI 23.15 kg/m    GENERAL APPEARANCE:  Alert, in no distress  ENT:  Mouth and posterior oropharynx normal, moist mucous membranes, [place]  EYES:  EOM, conjunctivae, lids, pupils and irises normal, PERRL  RESP:  respiratory effort and palpation of chest normal, lungs clear to auscultation , no respiratory distress  CV:  regular rate and rhythm, no murmur, rub, or " gallop, peripheral edema trace+ in LE bilaterally  ABDOMEN:  normal bowel sounds, soft, nontender, no hepatosplenomegaly or other masses  M/S:   patient resting in bed  SKIN:  Inspection of skin and subcutaneous tissue baseline  NEURO:   speech wnl  PSYCH:  affect and mood normal    Recent labs in [facility] reviewed by me today.  and Most Recent 3 CBC's:  Recent Labs   Lab Test 11/03/24  1037 11/01/24  2058 10/05/24  0719   WBC 6.6 8.5 8.6   HGB 11.4* 11.9* 13.4   MCV 99 99 100    180 169     Most Recent 3 BMP's:  Recent Labs   Lab Test 11/03/24  1037 11/02/24  2206 11/02/24  0834 11/02/24  0340 11/01/24 2058     --  136  --  132*   POTASSIUM 4.5  --  4.0  --  3.9   CHLORIDE 104  --  102  --  97*   CO2 19*  --  19*  --  17*   BUN 32.4*  --  40.5*  --  47.6*   CR 1.19*  --  1.33*  --  1.36*   ANIONGAP 15  --  15  --  18*   VIVIAN 9.4  --  9.4  --  9.7   * 204* 183*   < > 261*    < > = values in this interval not displayed.       Assessment/Plan:  (N39.0) UTI associated with willis catheter  (R33.9) urinary retention  Acute/ongoing,  Hospitalized 11/1-11/4/24 for hypotension and UTI  Plan: continue ceftin 500mg BID for 7 days,  flomax 0.4mg QD, f/u with urology as directed,  willis cares, will attempt TOV once patient is more ambulatory    (M16.12) Primary osteoarthritis of left hip  (primary encounter diagnosis)  (R53.81) Physical deconditioning  Comment: acute/ongoing s/p left YELENA 10/1/24  No change  Plan: PT and OT, tylenol 500mg TID, dilaudid 2-4mg q 4 hours prn, hydroxyzine 10mg q 6 hours prn,   Finished Lovenox  ASA 81mg QD  F/u with ortho as directed     (I25.10) Coronary artery disease involving native coronary artery of native heart without angina pectoris  (I50.32) Chronic diastolic heart failure (H)  (I10) Benign essential hypertension  (N18.30) Stage 3 chronic kidney disease, unspecified whether stage 3a or 3b CKD (H)  Comment: acute/ongoing, no change   Required ICU stay for hypotension/shock  post op, elevated troponin, no ischemic findings per cardiology EF of 60%  Plan: BMP follow,  No further lasix  continue coreg to 3.125mg BID  Continue entresto 24-26mg BID, jardiance 10mg QD     (D62) Anemia due to blood loss, acute  Comment: acute/ongoing  Hgb 11.9 on 10/10/24, hgb 12 on 10/28/24  Plan: Hgb follow     (K59.03) Drug-induced constipation  Comment: acute/ongoing  Plan: continue senna s 2 PO BID, continue miralax 17gm QD prn            MED REC REQUIRED  Post Medication Reconciliation Status: medication reconcilation previously completed during another office visit      Orders:  CBC and BMP on Monday    Total time with patient visit: 48 minutes including discussions about the POC and care coordination with the patient. Greater than 50% of total time spent with counseling and coordinating care due to reviewed internal medicine and urology progress notes, medication changes and lab results. Discussed hospitalization and POC with patient at bedside, discussed POC with nursing at facility.   Electronically signed by: Tonya Lynn Haase, APRN CNP         Sincerely,        Tonya Lynn Haase, APRN CNP

## 2024-11-08 ENCOUNTER — DOCUMENTATION ONLY (OUTPATIENT)
Dept: OTHER | Facility: CLINIC | Age: 79
End: 2024-11-08
Payer: COMMERCIAL

## 2024-11-11 ENCOUNTER — DOCUMENTATION ONLY (OUTPATIENT)
Dept: GERIATRICS | Facility: CLINIC | Age: 79
End: 2024-11-11
Payer: COMMERCIAL

## 2024-11-12 ENCOUNTER — TRANSITIONAL CARE UNIT VISIT (OUTPATIENT)
Dept: GERIATRICS | Facility: CLINIC | Age: 79
End: 2024-11-12
Payer: COMMERCIAL

## 2024-11-12 VITALS
HEIGHT: 67 IN | BODY MASS INDEX: 25.9 KG/M2 | RESPIRATION RATE: 17 BRPM | HEART RATE: 88 BPM | DIASTOLIC BLOOD PRESSURE: 64 MMHG | WEIGHT: 165 LBS | SYSTOLIC BLOOD PRESSURE: 101 MMHG | TEMPERATURE: 97.8 F | OXYGEN SATURATION: 94 %

## 2024-11-12 DIAGNOSIS — N18.30 STAGE 3 CHRONIC KIDNEY DISEASE, UNSPECIFIED WHETHER STAGE 3A OR 3B CKD (H): ICD-10-CM

## 2024-11-12 DIAGNOSIS — K59.03 DRUG-INDUCED CONSTIPATION: ICD-10-CM

## 2024-11-12 DIAGNOSIS — R41.89 COGNITIVE IMPAIRMENT: ICD-10-CM

## 2024-11-12 DIAGNOSIS — R33.9 URINARY RETENTION: ICD-10-CM

## 2024-11-12 DIAGNOSIS — I50.32 CHRONIC DIASTOLIC HEART FAILURE (H): ICD-10-CM

## 2024-11-12 DIAGNOSIS — N39.0 URINARY TRACT INFECTION ASSOCIATED WITH INDWELLING URETHRAL CATHETER, SUBSEQUENT ENCOUNTER: ICD-10-CM

## 2024-11-12 DIAGNOSIS — T83.511D URINARY TRACT INFECTION ASSOCIATED WITH INDWELLING URETHRAL CATHETER, SUBSEQUENT ENCOUNTER: ICD-10-CM

## 2024-11-12 DIAGNOSIS — I10 BENIGN ESSENTIAL HYPERTENSION: ICD-10-CM

## 2024-11-12 DIAGNOSIS — D62 ANEMIA DUE TO BLOOD LOSS, ACUTE: ICD-10-CM

## 2024-11-12 DIAGNOSIS — R53.81 PHYSICAL DECONDITIONING: ICD-10-CM

## 2024-11-12 DIAGNOSIS — M16.12 PRIMARY OSTEOARTHRITIS OF LEFT HIP: Primary | ICD-10-CM

## 2024-11-12 DIAGNOSIS — I25.10 CORONARY ARTERY DISEASE INVOLVING NATIVE CORONARY ARTERY OF NATIVE HEART WITHOUT ANGINA PECTORIS: ICD-10-CM

## 2024-11-12 DIAGNOSIS — I63.9 ACUTE ISCHEMIC STROKE (H): ICD-10-CM

## 2024-11-12 DIAGNOSIS — E11.29 TYPE 2 DIABETES MELLITUS WITH OTHER DIABETIC KIDNEY COMPLICATION (H): ICD-10-CM

## 2024-11-12 PROCEDURE — 99310 SBSQ NF CARE HIGH MDM 45: CPT | Performed by: NURSE PRACTITIONER

## 2024-11-12 NOTE — LETTER
" 11/12/2024      Chris Bhakta  [address] [place] [address]        [facility] GERIATRICS    Chief Complaint   Patient presents with     RECHECK     HPI:  Chris Bhakta is a 78 year old  (1945), who is being seen today for an episodic care visit at: [facility]) [25]. Today's concern is:   Left hip DJD s/p YELENA on exam today patient is resting in bed, alert, states he has pain in legs, knees and hips bilaterally which is ongoing c/o, rates pain at time of exam as 8/10  In therapy patient is walking up to 20 feet using a table walker with CGA  Urinary retention/UTI: ongoing willis catheter  Acute CVA/cognitive impairment; BIMS 13/15 and SBT 14/28  CAD/HTN/CKD: BP  101/64, 121/74, 112/57 with HR 70-80 range, denies CP, palpitations, SOB   Admission weight was 175lbs and current weight is 165.7lbs  Anemia Hgb 11.2 on 11/11/24  Constipation no concerns noted  DMII blood sugar range 132-237    Allergies, and PMH/PSH reviewed in [facility] today.  REVIEW OF SYSTEMS:  10 point ROS of systems including Constitutional, Eyes, Respiratory, Cardiovascular, Gastroenterology, Genitourinary, Integumentary, Musculoskeletal, Psychiatric were all negative except for pertinent positives noted in my HPI.    Objective:   /64   Pulse 88   Temp 97.8  F (36.6  C)   Resp 17   Ht 1.702 m (5' 7\")   Wt 74.8 kg (165 lb)   SpO2 94%   BMI 25.84 kg/m    GENERAL APPEARANCE:  Alert, in no distress  ENT:  Mouth and posterior oropharynx normal, moist mucous membranes, [place]  EYES:  EOM, conjunctivae, lids, pupils and irises normal, PERRL  RESP:  respiratory effort and palpation of chest normal, lungs clear to auscultation , no respiratory distress  CV:  regular rate and rhythm, no murmur, rub, or gallop, peripheral edema trace+ in LE bilaterally  ABDOMEN:  normal bowel sounds, soft, nontender, no hepatosplenomegaly or other masses  M/S:   patient resting in bed  SKIN:  Inspection of skin and subcutaneous " tissue baseline  NEURO:   speech wnl  PSYCH:  affect and mood normal    Recent labs in [facility] reviewed by me today.  and Most Recent 3 CBC's:  Recent Labs   Lab Test 11/03/24  1037 11/01/24  2058 10/05/24  0719   WBC 6.6 8.5 8.6   HGB 11.4* 11.9* 13.4   MCV 99 99 100    180 169     Most Recent 3 BMP's:  Recent Labs   Lab Test 11/03/24  1037 11/02/24  2206 11/02/24  0834 11/02/24  0340 11/01/24 2058     --  136  --  132*   POTASSIUM 4.5  --  4.0  --  3.9   CHLORIDE 104  --  102  --  97*   CO2 19*  --  19*  --  17*   BUN 32.4*  --  40.5*  --  47.6*   CR 1.19*  --  1.33*  --  1.36*   ANIONGAP 15  --  15  --  18*   VIVIAN 9.4  --  9.4  --  9.7   * 204* 183*   < > 261*    < > = values in this interval not displayed.       Assessment/Plan:  (M16.12) Primary osteoarthritis of left hip  (primary encounter diagnosis)  (R53.81) Physical deconditioning  Comment: acute/ongoing s/p left YELENA 10/1/24  No change  Plan: PT and OT, tylenol 500mg TID, dilaudid 2-4mg q 4 hours prn, hydroxyzine 10mg q 6 hours prn,   Finished Lovenox  ASA 81mg QD  F/u with ortho as directed    (N39.0) UTI associated with willis catheter  (R33.9) urinary retention  Acute/ongoing, no change  Hospitalized 11/1-11/4/24 for hypotension and UTI  Plan: finished ceftin 500mg course, continue   flomax 0.4mg QD, f/u with urology as directed,  willis cares, will attempt TOV once patient is more ambulatory    (I63.9) acute ischemic stroke  (R41.89) cognitive impairment  Acute/ongoing, no change  BIMS 13/15 and SBT 14/28  Plan: continue PT and OT, atorvastatin 40mg qhs, ASA 81mg QD     (I25.10) Coronary artery disease involving native coronary artery of native heart without angina pectoris  (I50.32) Chronic diastolic heart failure (H)  (I10) Benign essential hypertension  (N18.30) Stage 3 chronic kidney disease, unspecified whether stage 3a or 3b CKD (H)  Comment: acute/ongoing, no change  Required ICU stay for hypotension/shock post op, elevated  troponin, no ischemic findings per cardiology EF of 60%  Plan: BMP follow,  No further lasix  continue coreg to 3.125mg BID  Continue entresto 24-26mg BID, jardiance 10mg QD     (D62) Anemia due to blood loss, acute  Comment: acute/ongoing, no change  Hgb 11.9 on 10/10/24, hgb 12 on 10/28/24  Plan: Hgb follow     (K59.03) Drug-induced constipation  Comment: acute/ongoing, no change  Plan: continue senna s 2 PO BID, continue miralax 17gm QD prn    (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 Greater than 50% of total time spent with counseling and coordinating care due to reviewed nursing and therapy progress notes, medications and POC, discussed pain control and progress in therapy with patient at bedside, encouraged patient to participate in therapy.   Electronically signed by: Tonya Lynn Haase, APRN CNP          Sincerely,        Tonya Lynn Haase, APRN CNP

## 2024-11-12 NOTE — PROGRESS NOTES
"[facility] GERIATRICS    Chief Complaint   Patient presents with    RECHECK     HPI:  Chris Bhakta is a 78 year old  (1945), who is being seen today for an episodic care visit at: [facility]) [25]. Today's concern is:   Left hip DJD s/p YELENA on exam today patient is resting in bed, alert, states he has pain in legs, knees and hips bilaterally which is ongoing c/o, rates pain at time of exam as 8/10  In therapy patient is walking up to 20 feet using a table walker with CGA  Urinary retention/UTI: ongoing willis catheter  Acute CVA/cognitive impairment; BIMS 13/15 and SBT 14/28  CAD/HTN/CKD: BP  101/64, 121/74, 112/57 with HR 70-80 range, denies CP, palpitations, SOB   Admission weight was 175lbs and current weight is 165.7lbs  Anemia Hgb 11.2 on 11/11/24  Constipation no concerns noted  DMII blood sugar range 132-237    Allergies, and PMH/PSH reviewed in [facility] today.  REVIEW OF SYSTEMS:  10 point ROS of systems including Constitutional, Eyes, Respiratory, Cardiovascular, Gastroenterology, Genitourinary, Integumentary, Musculoskeletal, Psychiatric were all negative except for pertinent positives noted in my HPI.    Objective:   /64   Pulse 88   Temp 97.8  F (36.6  C)   Resp 17   Ht 1.702 m (5' 7\")   Wt 74.8 kg (165 lb)   SpO2 94%   BMI 25.84 kg/m    GENERAL APPEARANCE:  Alert, in no distress  ENT:  Mouth and posterior oropharynx normal, moist mucous membranes, [place]  EYES:  EOM, conjunctivae, lids, pupils and irises normal, PERRL  RESP:  respiratory effort and palpation of chest normal, lungs clear to auscultation , no respiratory distress  CV:  regular rate and rhythm, no murmur, rub, or gallop, peripheral edema trace+ in LE bilaterally  ABDOMEN:  normal bowel sounds, soft, nontender, no hepatosplenomegaly or other masses  M/S:   patient resting in bed  SKIN:  Inspection of skin and subcutaneous tissue baseline  NEURO:   speech wnl  PSYCH:  affect and mood normal    Recent " labs in [facility] reviewed by me today.  and Most Recent 3 CBC's:  Recent Labs   Lab Test 11/03/24  1037 11/01/24  2058 10/05/24  0719   WBC 6.6 8.5 8.6   HGB 11.4* 11.9* 13.4   MCV 99 99 100    180 169     Most Recent 3 BMP's:  Recent Labs   Lab Test 11/03/24  1037 11/02/24  2206 11/02/24  0834 11/02/24  0340 11/01/24 2058     --  136  --  132*   POTASSIUM 4.5  --  4.0  --  3.9   CHLORIDE 104  --  102  --  97*   CO2 19*  --  19*  --  17*   BUN 32.4*  --  40.5*  --  47.6*   CR 1.19*  --  1.33*  --  1.36*   ANIONGAP 15  --  15  --  18*   VIVIAN 9.4  --  9.4  --  9.7   * 204* 183*   < > 261*    < > = values in this interval not displayed.       Assessment/Plan:  (M16.12) Primary osteoarthritis of left hip  (primary encounter diagnosis)  (R53.81) Physical deconditioning  Comment: acute/ongoing s/p left YELENA 10/1/24  No change  Plan: PT and OT, tylenol 500mg TID, dilaudid 2-4mg q 4 hours prn, hydroxyzine 10mg q 6 hours prn,   Finished Lovenox  ASA 81mg QD  F/u with ortho as directed    (N39.0) UTI associated with willis catheter  (R33.9) urinary retention  Acute/ongoing, no change  Hospitalized 11/1-11/4/24 for hypotension and UTI  Plan: finished ceftin 500mg course, continue   flomax 0.4mg QD, f/u with urology as directed,  willis cares, will attempt TOV once patient is more ambulatory    (I63.9) acute ischemic stroke  (R41.89) cognitive impairment  Acute/ongoing, no change  BIMS 13/15 and SBT 14/28  Plan: continue PT and OT, atorvastatin 40mg qhs, ASA 81mg QD     (I25.10) Coronary artery disease involving native coronary artery of native heart without angina pectoris  (I50.32) Chronic diastolic heart failure (H)  (I10) Benign essential hypertension  (N18.30) Stage 3 chronic kidney disease, unspecified whether stage 3a or 3b CKD (H)  Comment: acute/ongoing, no change  Required ICU stay for hypotension/shock post op, elevated troponin, no ischemic findings per cardiology EF of 60%  Plan: BMP follow,  No  further lasix  continue coreg to 3.125mg BID  Continue entresto 24-26mg BID, jardiance 10mg QD     (D62) Anemia due to blood loss, acute  Comment: acute/ongoing, no change  Hgb 11.9 on 10/10/24, hgb 12 on 10/28/24  Plan: Hgb follow     (K59.03) Drug-induced constipation  Comment: acute/ongoing, no change  Plan: continue senna s 2 PO BID, continue miralax 17gm QD prn    (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 Greater than 50% of total time spent with counseling and coordinating care due to reviewed nursing and therapy progress notes, medications and POC, discussed pain control and progress in therapy with patient at bedside, encouraged patient to participate in therapy.   Electronically signed by: Tonya Lynn Haase, APRN CNP

## 2024-11-14 ENCOUNTER — TRANSITIONAL CARE UNIT VISIT (OUTPATIENT)
Dept: GERIATRICS | Facility: CLINIC | Age: 79
End: 2024-11-14
Payer: COMMERCIAL

## 2024-11-14 VITALS
HEIGHT: 67 IN | RESPIRATION RATE: 18 BRPM | BODY MASS INDEX: 25.9 KG/M2 | OXYGEN SATURATION: 95 % | HEART RATE: 82 BPM | DIASTOLIC BLOOD PRESSURE: 61 MMHG | TEMPERATURE: 97.9 F | SYSTOLIC BLOOD PRESSURE: 96 MMHG | WEIGHT: 165 LBS

## 2024-11-14 DIAGNOSIS — T83.511D URINARY TRACT INFECTION ASSOCIATED WITH INDWELLING URETHRAL CATHETER, SUBSEQUENT ENCOUNTER: ICD-10-CM

## 2024-11-14 DIAGNOSIS — R47.81 SLURRED SPEECH: ICD-10-CM

## 2024-11-14 DIAGNOSIS — N39.0 URINARY TRACT INFECTION ASSOCIATED WITH INDWELLING URETHRAL CATHETER, SUBSEQUENT ENCOUNTER: ICD-10-CM

## 2024-11-14 DIAGNOSIS — E11.29 TYPE 2 DIABETES MELLITUS WITH OTHER DIABETIC KIDNEY COMPLICATION (H): ICD-10-CM

## 2024-11-14 DIAGNOSIS — I63.9 ACUTE ISCHEMIC STROKE (H): ICD-10-CM

## 2024-11-14 DIAGNOSIS — M16.12 PRIMARY OSTEOARTHRITIS OF LEFT HIP: Primary | ICD-10-CM

## 2024-11-14 DIAGNOSIS — R53.81 PHYSICAL DECONDITIONING: ICD-10-CM

## 2024-11-14 DIAGNOSIS — R41.89 COGNITIVE IMPAIRMENT: ICD-10-CM

## 2024-11-14 DIAGNOSIS — I10 BENIGN ESSENTIAL HYPERTENSION: ICD-10-CM

## 2024-11-14 DIAGNOSIS — I50.32 CHRONIC DIASTOLIC HEART FAILURE (H): ICD-10-CM

## 2024-11-14 DIAGNOSIS — Z96.642 HISTORY OF REVISION OF TOTAL REPLACEMENT OF LEFT HIP JOINT: ICD-10-CM

## 2024-11-14 DIAGNOSIS — R33.9 URINARY RETENTION: ICD-10-CM

## 2024-11-14 DIAGNOSIS — D62 ANEMIA DUE TO BLOOD LOSS, ACUTE: ICD-10-CM

## 2024-11-14 DIAGNOSIS — I25.10 CORONARY ARTERY DISEASE INVOLVING NATIVE CORONARY ARTERY OF NATIVE HEART WITHOUT ANGINA PECTORIS: ICD-10-CM

## 2024-11-14 DIAGNOSIS — N18.30 STAGE 3 CHRONIC KIDNEY DISEASE, UNSPECIFIED WHETHER STAGE 3A OR 3B CKD (H): ICD-10-CM

## 2024-11-14 DIAGNOSIS — K59.03 DRUG-INDUCED CONSTIPATION: ICD-10-CM

## 2024-11-14 PROCEDURE — 99310 SBSQ NF CARE HIGH MDM 45: CPT | Performed by: NURSE PRACTITIONER

## 2024-11-14 RX ORDER — HYDROMORPHONE HYDROCHLORIDE 2 MG/1
2 TABLET ORAL EVERY 6 HOURS PRN
Status: SHIPPED
Start: 2024-11-14

## 2024-11-14 NOTE — LETTER
" 11/14/2024      Chris Bhakta  [address] [place] [address]        [facility] GERIATRICS    Chief Complaint   Patient presents with     RECHECK     HPI:  Chris Bhakta is a 78 year old  (1945), who is being seen today for an episodic care visit at: [facility]) [25]. Today's concern is:   Left hip DJD s/p YELENA on exam today patient is resting in bed, states he has ongoing pain in hips and knees, rates pain as 8/10 at time of exam  In therapy patient is walking up to 50 feet using a RW with CGA making slow progress  Urinary retention/UTI: ongoing willis catheter  Will DC willis   Acute CVA/cognitive impairment; BIMS 13/15 and SBT 14/28  CAD/HTN/CKD: BP  106/67, 96/61, 119/71  with HR 70-80 range, denies CP, palpitations, SOB   Admission weight was 175lbs and current weight is 158.7lbs  Anemia Hgb 11.2 on 11/11/24  LBM 11/12/24   DMII blood sugar range 132-237    Allergies, and PMH/PSH reviewed in [facility] today.  REVIEW OF SYSTEMS:  10 point ROS of systems including Constitutional, Eyes, Respiratory, Cardiovascular, Gastroenterology, Genitourinary, Integumentary, Musculoskeletal, Psychiatric were all negative except for pertinent positives noted in my HPI.    Objective:   BP 96/61   Pulse 82   Temp 97.9  F (36.6  C)   Resp 18   Ht 1.702 m (5' 7\")   Wt 74.8 kg (165 lb)   SpO2 95%   BMI 25.84 kg/m    GENERAL APPEARANCE:  Alert, in no distress  ENT:  Mouth and posterior oropharynx normal, moist mucous membranes, [place]  EYES:  EOM, conjunctivae, lids, pupils and irises normal, PERRL  RESP:  respiratory effort and palpation of chest normal, lungs clear to auscultation , no respiratory distress  CV:  regular rate and rhythm, no murmur, rub, or gallop, peripheral edema trace+ in LE bilaterally  ABDOMEN:  normal bowel sounds, soft, nontender, no hepatosplenomegaly or other masses  M/S:   patient resting in bed  SKIN:  Inspection of skin and subcutaneous tissue baseline  NEURO:   " speech wnl  PSYCH:  affect and mood normal    Recent labs in [facility] reviewed by me today.  and Most Recent 3 CBC's:  Recent Labs   Lab Test 11/03/24  1037 11/01/24  2058 10/05/24  0719   WBC 6.6 8.5 8.6   HGB 11.4* 11.9* 13.4   MCV 99 99 100    180 169     Most Recent 3 BMP's:  Recent Labs   Lab Test 11/03/24  1037 11/02/24  2206 11/02/24  0834 11/02/24  0340 11/01/24 2058     --  136  --  132*   POTASSIUM 4.5  --  4.0  --  3.9   CHLORIDE 104  --  102  --  97*   CO2 19*  --  19*  --  17*   BUN 32.4*  --  40.5*  --  47.6*   CR 1.19*  --  1.33*  --  1.36*   ANIONGAP 15  --  15  --  18*   VIVIAN 9.4  --  9.4  --  9.7   * 204* 183*   < > 261*    < > = values in this interval not displayed.       Assessment/Plan:  (M16.12) Primary osteoarthritis of left hip  (primary encounter diagnosis)  (R53.81) Physical deconditioning  Comment: acute/ongoing s/p left YELENA 10/1/24  No change  Plan: PT and OT, tylenol 500mg TID,  dilaudid to 2mg q 6 hours prn, continue hydroxyzine 10mg q 6 hours prn,   Finished Lovenox  ASA 81mg QD  F/u with ortho as directed     (N39.0) UTI associated with willis catheter  (R33.9) urinary retention  Acute/ongoing  Hospitalized 11/1-11/4/24 for hypotension and UTI  Plan: finished ceftin 500mg course, continue   flomax 0.4mg QD, f/u with urology as directed,  willis cares, will attempt TOV on Monday 11/18/24     (I63.9) acute ischemic stroke  (R41.89) cognitive impairment  Acute/ongoing, no change  BIMS 13/15 and SBT 14/28  Plan: continue PT and OT, atorvastatin 40mg qhs, ASA 81mg QD     (I25.10) Coronary artery disease involving native coronary artery of native heart without angina pectoris  (I50.32) Chronic diastolic heart failure (H)  (I10) Benign essential hypertension  (N18.30) Stage 3 chronic kidney disease, unspecified whether stage 3a or 3b CKD (H)  Comment: acute/ongoing, no change  Required ICU stay for hypotension/shock post op, elevated troponin, no ischemic findings per  cardiology EF of 60%  Plan: BMP follow,  No further lasix  continue coreg to 3.125mg BID  Continue entresto 24-26mg BID, jardiance 10mg QD     (D62) Anemia due to blood loss, acute  Comment: acute/ongoing, no change  Hgb 11.9 on 10/10/24, hgb 12 on 10/28/24  Plan: Hgb follow     (K59.03) Drug-induced constipation  Comment: acute/ongoing, no change  Plan: continue senna s 2 PO BID, continue miralax 17gm QD prn     (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 Greater than 50% of total time spent with counseling and coordinating care due to reviewed nursing and therapy progress notes, medications and POC. Discussed pain control and POC with patient at bedside, discussed progress in therapy and discharge plan with IDT at facility.   Electronically signed by: Tonya Lynn Haase, APRN CNP         Sincerely,        Tonya Lynn Haase, APRN CNP

## 2024-11-14 NOTE — PROGRESS NOTES
"[facility] GERIATRICS    Chief Complaint   Patient presents with    RECHECK     HPI:  Chris Bhakta is a 78 year old  (1945), who is being seen today for an episodic care visit at: [facility]) [25]. Today's concern is:   Left hip DJD s/p YELENA on exam today patient is resting in bed, states he has ongoing pain in hips and knees, rates pain as 8/10 at time of exam  In therapy patient is walking up to 50 feet using a RW with CGA making slow progress  Urinary retention/UTI: ongoing willis catheter  Will DC willis   Acute CVA/cognitive impairment; BIMS 13/15 and SBT 14/28  CAD/HTN/CKD: BP  106/67, 96/61, 119/71  with HR 70-80 range, denies CP, palpitations, SOB   Admission weight was 175lbs and current weight is 158.7lbs  Anemia Hgb 11.2 on 11/11/24  LBM 11/12/24   DMII blood sugar range 132-237    Allergies, and PMH/PSH reviewed in [facility] today.  REVIEW OF SYSTEMS:  10 point ROS of systems including Constitutional, Eyes, Respiratory, Cardiovascular, Gastroenterology, Genitourinary, Integumentary, Musculoskeletal, Psychiatric were all negative except for pertinent positives noted in my HPI.    Objective:   BP 96/61   Pulse 82   Temp 97.9  F (36.6  C)   Resp 18   Ht 1.702 m (5' 7\")   Wt 74.8 kg (165 lb)   SpO2 95%   BMI 25.84 kg/m    GENERAL APPEARANCE:  Alert, in no distress  ENT:  Mouth and posterior oropharynx normal, moist mucous membranes, [place]  EYES:  EOM, conjunctivae, lids, pupils and irises normal, PERRL  RESP:  respiratory effort and palpation of chest normal, lungs clear to auscultation , no respiratory distress  CV:  regular rate and rhythm, no murmur, rub, or gallop, peripheral edema trace+ in LE bilaterally  ABDOMEN:  normal bowel sounds, soft, nontender, no hepatosplenomegaly or other masses  M/S:   patient resting in bed  SKIN:  Inspection of skin and subcutaneous tissue baseline  NEURO:   speech wnl  PSYCH:  affect and mood normal    Recent labs in [facility] reviewed by me " today.  and Most Recent 3 CBC's:  Recent Labs   Lab Test 11/03/24  1037 11/01/24  2058 10/05/24  0719   WBC 6.6 8.5 8.6   HGB 11.4* 11.9* 13.4   MCV 99 99 100    180 169     Most Recent 3 BMP's:  Recent Labs   Lab Test 11/03/24  1037 11/02/24  2206 11/02/24  0834 11/02/24  0340 11/01/24 2058     --  136  --  132*   POTASSIUM 4.5  --  4.0  --  3.9   CHLORIDE 104  --  102  --  97*   CO2 19*  --  19*  --  17*   BUN 32.4*  --  40.5*  --  47.6*   CR 1.19*  --  1.33*  --  1.36*   ANIONGAP 15  --  15  --  18*   VIVIAN 9.4  --  9.4  --  9.7   * 204* 183*   < > 261*    < > = values in this interval not displayed.       Assessment/Plan:  (M16.12) Primary osteoarthritis of left hip  (primary encounter diagnosis)  (R53.81) Physical deconditioning  Comment: acute/ongoing s/p left YELENA 10/1/24  No change  Plan: PT and OT, tylenol 500mg TID,  dilaudid to 2mg q 6 hours prn, continue hydroxyzine 10mg q 6 hours prn,   Finished Lovenox  ASA 81mg QD  F/u with ortho as directed     (N39.0) UTI associated with willis catheter  (R33.9) urinary retention  Acute/ongoing  Hospitalized 11/1-11/4/24 for hypotension and UTI  Plan: finished ceftin 500mg course, continue   flomax 0.4mg QD, f/u with urology as directed,  willis cares, will attempt TOV on Monday 11/18/24     (I63.9) acute ischemic stroke  (R41.89) cognitive impairment  Acute/ongoing, no change  BIMS 13/15 and SBT 14/28  Plan: continue PT and OT, atorvastatin 40mg qhs, ASA 81mg QD     (I25.10) Coronary artery disease involving native coronary artery of native heart without angina pectoris  (I50.32) Chronic diastolic heart failure (H)  (I10) Benign essential hypertension  (N18.30) Stage 3 chronic kidney disease, unspecified whether stage 3a or 3b CKD (H)  Comment: acute/ongoing, no change  Required ICU stay for hypotension/shock post op, elevated troponin, no ischemic findings per cardiology EF of 60%  Plan: BMP follow,  No further lasix  continue coreg to 3.125mg  BID  Continue entresto 24-26mg BID, jardiance 10mg QD     (D62) Anemia due to blood loss, acute  Comment: acute/ongoing, no change  Hgb 11.9 on 10/10/24, hgb 12 on 10/28/24  Plan: Hgb follow     (K59.03) Drug-induced constipation  Comment: acute/ongoing, no change  Plan: continue senna s 2 PO BID, continue miralax 17gm QD prn     (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 Greater than 50% of total time spent with counseling and coordinating care due to reviewed nursing and therapy progress notes, medications and POC. Discussed pain control and POC with patient at bedside, discussed progress in therapy and discharge plan with IDT at facility.   Electronically signed by: Tonya Lynn Haase, APRN CNP

## 2024-11-19 ENCOUNTER — TRANSITIONAL CARE UNIT VISIT (OUTPATIENT)
Dept: GERIATRICS | Facility: CLINIC | Age: 79
End: 2024-11-19
Payer: COMMERCIAL

## 2024-11-19 VITALS
OXYGEN SATURATION: 96 % | RESPIRATION RATE: 16 BRPM | HEART RATE: 86 BPM | SYSTOLIC BLOOD PRESSURE: 121 MMHG | HEIGHT: 67 IN | DIASTOLIC BLOOD PRESSURE: 74 MMHG | BODY MASS INDEX: 24.8 KG/M2 | WEIGHT: 158 LBS | TEMPERATURE: 97.5 F

## 2024-11-19 DIAGNOSIS — N39.0 URINARY TRACT INFECTION ASSOCIATED WITH INDWELLING URETHRAL CATHETER, SUBSEQUENT ENCOUNTER: ICD-10-CM

## 2024-11-19 DIAGNOSIS — I63.9 ACUTE ISCHEMIC STROKE (H): ICD-10-CM

## 2024-11-19 DIAGNOSIS — R33.9 URINARY RETENTION: ICD-10-CM

## 2024-11-19 DIAGNOSIS — I25.10 CORONARY ARTERY DISEASE INVOLVING NATIVE CORONARY ARTERY OF NATIVE HEART WITHOUT ANGINA PECTORIS: ICD-10-CM

## 2024-11-19 DIAGNOSIS — R41.89 COGNITIVE IMPAIRMENT: ICD-10-CM

## 2024-11-19 DIAGNOSIS — N18.30 STAGE 3 CHRONIC KIDNEY DISEASE, UNSPECIFIED WHETHER STAGE 3A OR 3B CKD (H): ICD-10-CM

## 2024-11-19 DIAGNOSIS — R53.81 PHYSICAL DECONDITIONING: ICD-10-CM

## 2024-11-19 DIAGNOSIS — I10 BENIGN ESSENTIAL HYPERTENSION: ICD-10-CM

## 2024-11-19 DIAGNOSIS — I50.32 CHRONIC DIASTOLIC HEART FAILURE (H): ICD-10-CM

## 2024-11-19 DIAGNOSIS — D62 ANEMIA DUE TO BLOOD LOSS, ACUTE: ICD-10-CM

## 2024-11-19 DIAGNOSIS — K59.03 DRUG-INDUCED CONSTIPATION: ICD-10-CM

## 2024-11-19 DIAGNOSIS — T83.511D URINARY TRACT INFECTION ASSOCIATED WITH INDWELLING URETHRAL CATHETER, SUBSEQUENT ENCOUNTER: ICD-10-CM

## 2024-11-19 DIAGNOSIS — M16.12 PRIMARY OSTEOARTHRITIS OF LEFT HIP: Primary | ICD-10-CM

## 2024-11-19 PROCEDURE — 99310 SBSQ NF CARE HIGH MDM 45: CPT | Performed by: NURSE PRACTITIONER

## 2024-11-19 NOTE — LETTER
" 11/19/2024      Chris Bhakta  [address] [place] [address]        [facility] GERIATRICS    Chief Complaint   Patient presents with     RECHECK     HPI:  Chris Bhkata is a 78 year old  (1945), who is being seen today for an episodic care visit at: [facility]) [25]. Today's concern is:   Left hip DJD s/p YELENA on exam today patient is resting in bed, ongoing c/o bilateral hip pain, unable to rate pain at time of exam  In therapy patient is walking 40-50 feet using a platform walker with CGA making slow progress  Total assist with cares  Urinary retention/UTI: willis catheter dc yesterday 11/18/24, patient voiding in urinal  Acute CVA/cognitive impairment; BIMS 13/15 and SBT 14/28  CAD/HTN/CKD: BP  121/74, 109/63, 106/67 with HR 70-80 range, denies CP, palpitations, SOB   Admission weight was 175lbs and current weight is 158.7lbs  Anemia Hgb 11.2 on 11/11/24  LBM 11/13/24 per nursing notes  DMII blood sugar range 138-237    Allergies, and PMH/PSH reviewed in [facility] today.  REVIEW OF SYSTEMS:  10 point ROS of systems including Constitutional, Eyes, Respiratory, Cardiovascular, Gastroenterology, Genitourinary, Integumentary, Musculoskeletal, Psychiatric were all negative except for pertinent positives noted in my HPI.    Objective:   /74   Pulse 86   Temp 97.5  F (36.4  C)   Resp 16   Ht 1.702 m (5' 7\")   Wt 71.7 kg (158 lb)   SpO2 96%   BMI 24.75 kg/m    GENERAL APPEARANCE:  Alert, in no distress  ENT:  Mouth and posterior oropharynx normal, moist mucous membranes, [place]  EYES:  EOM, conjunctivae, lids, pupils and irises normal, PERRL  RESP:  respiratory effort and palpation of chest normal, lungs clear to auscultation , no respiratory distress  CV:  regular rate and rhythm, no murmur, rub, or gallop, no edema  ABDOMEN:  normal bowel sounds, soft, nontender, no hepatosplenomegaly or other masses  M/S:   patient resting in  bed  SKIN:  Inspection of skin and " subcutaneous tissue baseline  NEURO:   speech wnl  PSYCH:  affect and mood normal    Recent labs in [facility] reviewed by me today.  and Most Recent 3 CBC's:  Recent Labs   Lab Test 11/03/24  1037 11/01/24  2058 10/05/24  0719   WBC 6.6 8.5 8.6   HGB 11.4* 11.9* 13.4   MCV 99 99 100    180 169     Most Recent 3 BMP's:  Recent Labs   Lab Test 11/03/24  1037 11/02/24  2206 11/02/24  0834 11/02/24  0340 11/01/24 2058     --  136  --  132*   POTASSIUM 4.5  --  4.0  --  3.9   CHLORIDE 104  --  102  --  97*   CO2 19*  --  19*  --  17*   BUN 32.4*  --  40.5*  --  47.6*   CR 1.19*  --  1.33*  --  1.36*   ANIONGAP 15  --  15  --  18*   VIVIAN 9.4  --  9.4  --  9.7   * 204* 183*   < > 261*    < > = values in this interval not displayed.       Assessment/Plan:  (M16.12) Primary osteoarthritis of left hip  (primary encounter diagnosis)  (R53.81) Physical deconditioning  Comment: acute/ongoing s/p left YELENA 10/1/24  No change  Plan: PT and OT, tylenol 500mg TID,  dilaudid to 2mg q 6 hours prn, continue hydroxyzine 10mg q 6 hours prn,   Finished Lovenox  ASA 81mg QD  F/u with ortho as directed     (N39.0) UTI associated with willis catheter  (R33.9) urinary retention  Acute/ongoing  Hospitalized 11/1-11/4/24 for hypotension and UTI  Plan: finished ceftin 500mg course, continue   flomax 0.4mg QD, f/u with urology as directed,  willis discontinued on 11/18/24 continue PVR q shift, straight cath for PVR > 350cc     (I63.9) acute ischemic stroke  (R41.89) cognitive impairment  Acute/ongoing, no change  BIMS 13/15 and SBT 14/28  Plan: continue PT and OT, atorvastatin 40mg qhs, ASA 81mg QD     (I25.10) Coronary artery disease involving native coronary artery of native heart without angina pectoris  (I50.32) Chronic diastolic heart failure (H)  (I10) Benign essential hypertension  (N18.30) Stage 3 chronic kidney disease, unspecified whether stage 3a or 3b CKD (H)  Comment: acute/ongoing, no change  Required ICU stay for  hypotension/shock post op, elevated troponin, no ischemic findings per cardiology EF of 60%  Plan: BMP follow,  No further lasix  continue coreg to 3.125mg BID  Continue entresto 24-26mg BID, jardiance 10mg QD     (D62) Anemia due to blood loss, acute  Comment: acute/ongoing, no change  Hgb 11.9 on 10/10/24, hgb 12 on 10/28/24  Plan: Hgb follow     (K59.03) Drug-induced constipation  Comment: acute/ongoing, no change  Plan: continue senna s 2 PO BID, continue miralax 17gm QD prn     (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 Greater than 50% of total time spent with counseling and coordinating care due to reviewed nursing and therapy progress notes, medications, lab results, medications and POC. Discussed DC willis and voiding status with patient at bedside. .   Electronically signed by: Tonya Lynn Haase, APRN CNP         Sincerely,        Tonya Lynn Haase, APRN CNP

## 2024-11-19 NOTE — PROGRESS NOTES
"[facility] GERIATRICS    Chief Complaint   Patient presents with    RECHECK     HPI:  Chris Bhakta is a 78 year old  (1945), who is being seen today for an episodic care visit at: [facility] ([facility]) [25]. Today's concern is:   Left hip DJD s/p YELENA on exam today patient is resting in bed, ongoing c/o bilateral hip pain, unable to rate pain at time of exam  In therapy patient is walking 40-50 feet using a platform walker with CGA making slow progress  Total assist with cares  Urinary retention/UTI: willis catheter dc yesterday 11/18/24, patient voiding in urinal  Acute CVA/cognitive impairment; BIMS 13/15 and SBT 14/28  CAD/HTN/CKD: BP  121/74, 109/63, 106/67 with HR 70-80 range, denies CP, palpitations, SOB   Admission weight was 175lbs and current weight is 158.7lbs  Anemia Hgb 11.2 on 11/11/24  LBM 11/13/24 per nursing notes  DMII blood sugar range 138-237    Allergies, and PMH/PSH reviewed in [facility] today.  REVIEW OF SYSTEMS:  10 point ROS of systems including Constitutional, Eyes, Respiratory, Cardiovascular, Gastroenterology, Genitourinary, Integumentary, Musculoskeletal, Psychiatric were all negative except for pertinent positives noted in my HPI.    Objective:   /74   Pulse 86   Temp 97.5  F (36.4  C)   Resp 16   Ht 1.702 m (5' 7\")   Wt 71.7 kg (158 lb)   SpO2 96%   BMI 24.75 kg/m    GENERAL APPEARANCE:  Alert, in no distress  ENT:  Mouth and posterior oropharynx normal, moist mucous membranes, [place]  EYES:  EOM, conjunctivae, lids, pupils and irises normal, PERRL  RESP:  respiratory effort and palpation of chest normal, lungs clear to auscultation , no respiratory distress  CV:  regular rate and rhythm, no murmur, rub, or gallop, no edema  ABDOMEN:  normal bowel sounds, soft, nontender, no hepatosplenomegaly or other masses  M/S:   patient resting in  bed  SKIN:  Inspection of skin and subcutaneous tissue baseline  NEURO:   speech wnl  PSYCH:  affect and mood normal    Recent " labs in EPIC reviewed by me today.  and Most Recent 3 CBC's:  Recent Labs   Lab Test 11/03/24  1037 11/01/24  2058 10/05/24  0719   WBC 6.6 8.5 8.6   HGB 11.4* 11.9* 13.4   MCV 99 99 100    180 169     Most Recent 3 BMP's:  Recent Labs   Lab Test 11/03/24  1037 11/02/24  2206 11/02/24  0834 11/02/24  0340 11/01/24 2058     --  136  --  132*   POTASSIUM 4.5  --  4.0  --  3.9   CHLORIDE 104  --  102  --  97*   CO2 19*  --  19*  --  17*   BUN 32.4*  --  40.5*  --  47.6*   CR 1.19*  --  1.33*  --  1.36*   ANIONGAP 15  --  15  --  18*   VIVIAN 9.4  --  9.4  --  9.7   * 204* 183*   < > 261*    < > = values in this interval not displayed.       Assessment/Plan:  (M16.12) Primary osteoarthritis of left hip  (primary encounter diagnosis)  (R53.81) Physical deconditioning  Comment: acute/ongoing s/p left YELENA 10/1/24  No change  Plan: PT and OT, tylenol 500mg TID,  dilaudid to 2mg q 6 hours prn, continue hydroxyzine 10mg q 6 hours prn,   Finished Lovenox  ASA 81mg QD  F/u with ortho as directed     (N39.0) UTI associated with willis catheter  (R33.9) urinary retention  Acute/ongoing  Hospitalized 11/1-11/4/24 for hypotension and UTI  Plan: finished ceftin 500mg course, continue   flomax 0.4mg QD, f/u with urology as directed,  willis discontinued on 11/18/24 continue PVR q shift, straight cath for PVR > 350cc     (I63.9) acute ischemic stroke  (R41.89) cognitive impairment  Acute/ongoing, no change  BIMS 13/15 and SBT 14/28  Plan: continue PT and OT, atorvastatin 40mg qhs, ASA 81mg QD     (I25.10) Coronary artery disease involving native coronary artery of native heart without angina pectoris  (I50.32) Chronic diastolic heart failure (H)  (I10) Benign essential hypertension  (N18.30) Stage 3 chronic kidney disease, unspecified whether stage 3a or 3b CKD (H)  Comment: acute/ongoing, no change  Required ICU stay for hypotension/shock post op, elevated troponin, no ischemic findings per cardiology EF of 60%  Plan:  BMP follow,  No further lasix  continue coreg to 3.125mg BID  Continue entresto 24-26mg BID, jardiance 10mg QD     (D62) Anemia due to blood loss, acute  Comment: acute/ongoing, no change  Hgb 11.9 on 10/10/24, hgb 12 on 10/28/24  Plan: Hgb follow     (K59.03) Drug-induced constipation  Comment: acute/ongoing, no change  Plan: continue senna s 2 PO BID, continue miralax 17gm QD prn     (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 Greater than 50% of total time spent with counseling and coordinating care due to reviewed nursing and therapy progress notes, medications, lab results, medications and POC. Discussed DC willis and voiding status with patient at bedside. .   Electronically signed by: Tonya Lynn Haase, APRN CNP

## 2024-11-26 ENCOUNTER — TRANSITIONAL CARE UNIT VISIT (OUTPATIENT)
Dept: GERIATRICS | Facility: CLINIC | Age: 79
End: 2024-11-26
Payer: COMMERCIAL

## 2024-11-26 VITALS
WEIGHT: 160 LBS | TEMPERATURE: 97.8 F | DIASTOLIC BLOOD PRESSURE: 69 MMHG | HEART RATE: 63 BPM | HEIGHT: 67 IN | BODY MASS INDEX: 25.11 KG/M2 | RESPIRATION RATE: 16 BRPM | SYSTOLIC BLOOD PRESSURE: 112 MMHG | OXYGEN SATURATION: 96 %

## 2024-11-26 DIAGNOSIS — R53.81 PHYSICAL DECONDITIONING: ICD-10-CM

## 2024-11-26 DIAGNOSIS — R41.89 COGNITIVE IMPAIRMENT: ICD-10-CM

## 2024-11-26 DIAGNOSIS — T83.511D URINARY TRACT INFECTION ASSOCIATED WITH INDWELLING URETHRAL CATHETER, SUBSEQUENT ENCOUNTER: ICD-10-CM

## 2024-11-26 DIAGNOSIS — N39.0 URINARY TRACT INFECTION ASSOCIATED WITH INDWELLING URETHRAL CATHETER, SUBSEQUENT ENCOUNTER: ICD-10-CM

## 2024-11-26 DIAGNOSIS — I10 BENIGN ESSENTIAL HYPERTENSION: ICD-10-CM

## 2024-11-26 DIAGNOSIS — N18.30 STAGE 3 CHRONIC KIDNEY DISEASE, UNSPECIFIED WHETHER STAGE 3A OR 3B CKD (H): ICD-10-CM

## 2024-11-26 DIAGNOSIS — I63.9 ACUTE ISCHEMIC STROKE (H): ICD-10-CM

## 2024-11-26 DIAGNOSIS — K59.03 DRUG-INDUCED CONSTIPATION: ICD-10-CM

## 2024-11-26 DIAGNOSIS — M16.12 PRIMARY OSTEOARTHRITIS OF LEFT HIP: Primary | ICD-10-CM

## 2024-11-26 DIAGNOSIS — I25.10 CORONARY ARTERY DISEASE INVOLVING NATIVE CORONARY ARTERY OF NATIVE HEART WITHOUT ANGINA PECTORIS: ICD-10-CM

## 2024-11-26 DIAGNOSIS — E11.29 TYPE 2 DIABETES MELLITUS WITH OTHER DIABETIC KIDNEY COMPLICATION (H): ICD-10-CM

## 2024-11-26 DIAGNOSIS — R33.9 URINARY RETENTION: ICD-10-CM

## 2024-11-26 DIAGNOSIS — D62 ANEMIA DUE TO BLOOD LOSS, ACUTE: ICD-10-CM

## 2024-11-26 DIAGNOSIS — I50.32 CHRONIC DIASTOLIC HEART FAILURE (H): ICD-10-CM

## 2024-11-26 PROCEDURE — 99310 SBSQ NF CARE HIGH MDM 45: CPT | Performed by: NURSE PRACTITIONER

## 2024-11-26 NOTE — TELEPHONE ENCOUNTER
Left Voicemail (1st Attempt) for the patient to call back and schedule the following:    Appointment type: Hospital Stroke Follow up  Provider: Talha Fontanez or Stroke IMER  Return date: end of Dec early Jan 2025  Specialty phone number: [phone]  Additional appointment(s) needed: NA  Additonal Notes: OK elliott Barahona to schedule with Estee or Talha Fontanez-in person  Talha-héctor Freed on 11/26/2024 at 9:40 AM

## 2024-11-26 NOTE — LETTER
" 11/26/2024      Chris Bhakta  [address] [place] [address]        [facility] GERIATRICS    Chief Complaint   Patient presents with     RECHECK     HPI:  Chris Bhakta is a 79 year old  (1945), who is being seen today for an episodic care visit at: [facility]) [25]. Today's concern is:   Left hip DJD s/p YELENA on exam today patient resting in bed, ongoing bilateral knee and hip pain reported   In therapy patient is walking 20-40 feet using a platform walker with CGA making slow progress  Total assist with cares no change  Urinary retention patient voding without difficulty  Acute CVA/cognitive impairment; BIMS 13/15 and SBT 14/28  CAD/HTN/CKD: BP  108/69, 112/69, 119/62  with HR 80 range, denies CP, palpitations, SOB   Admission weight was 175lbs and current weight is 160.8lbs  Anemia Hgb 11.2 on 11/11/24  LBM 11/13/24 per nursing notes  DMII blood sugar range 132-237    Allergies, and PMH/PSH reviewed in [facility] today.  REVIEW OF SYSTEMS:  10 point ROS of systems including Constitutional, Eyes, Respiratory, Cardiovascular, Gastroenterology, Genitourinary, Integumentary, Musculoskeletal, Psychiatric were all negative except for pertinent positives noted in my HPI.    Objective:   /69   Pulse 63   Temp 97.8  F (36.6  C)   Resp 16   Ht 1.702 m (5' 7\")   Wt 72.6 kg (160 lb)   SpO2 96%   BMI 25.06 kg/m    GENERAL APPEARANCE:  Alert, in no distress  ENT:  Mouth and posterior oropharynx normal, moist mucous membranes, [place]  EYES:  EOM, conjunctivae, lids, pupils and irises normal, PERRL  RESP:  respiratory effort and palpation of chest normal, lungs clear to auscultation , no respiratory distress  CV:  regular rate and rhythm, no murmur, rub, or gallop, peripheral edema trace+ in LE bilaterally  ABDOMEN:  normal bowel sounds, soft, nontender, no hepatosplenomegaly or other masses  M/S:   patient resting in bed  SKIN:  Inspection of skin and subcutaneous tissue " baseline  NEURO:   speech wnl  PSYCH:  affect and mood normal    Recent labs in EPIC reviewed by me today.  and Most Recent 3 CBC's:  Recent Labs   Lab Test 11/03/24  1037 11/01/24  2058 10/05/24  0719   WBC 6.6 8.5 8.6   HGB 11.4* 11.9* 13.4   MCV 99 99 100    180 169     Most Recent 3 BMP's:  Recent Labs   Lab Test 11/03/24  1037 11/02/24  2206 11/02/24  0834 11/02/24  0340 11/01/24 2058     --  136  --  132*   POTASSIUM 4.5  --  4.0  --  3.9   CHLORIDE 104  --  102  --  97*   CO2 19*  --  19*  --  17*   BUN 32.4*  --  40.5*  --  47.6*   CR 1.19*  --  1.33*  --  1.36*   ANIONGAP 15  --  15  --  18*   VIVIAN 9.4  --  9.4  --  9.7   * 204* 183*   < > 261*    < > = values in this interval not displayed.       Assessment/Plan:  (M16.12) Primary osteoarthritis of left hip  (primary encounter diagnosis)  (R53.81) Physical deconditioning  Comment: acute/ongoing s/p left YELENA 10/1/24  No change  Plan: PT and OT, tylenol 500mg TID,  dilaudid to 2mg q 6 hours prn  DC hydroxyzine  Finished Lovenox  ASA 81mg QD  F/u with ortho as directed     (N39.0) UTI associated with willis catheter  (R33.9) urinary retention  Acute/ongoing, UTI resolved, willis discontinued 11/18/24  Hospitalized 11/1-11/4/24 for hypotension and UTI  Plan: finished ceftin 500mg course, f/u with urology as directed,  continue flomax 0.4mg qd     (I63.9) acute ischemic stroke  (R41.89) cognitive impairment  Acute/ongoing, no change  BIMS 13/15 and SBT 14/28  Plan: continue PT and OT, atorvastatin 40mg qhs, ASA 81mg QD     (I25.10) Coronary artery disease involving native coronary artery of native heart without angina pectoris  (I50.32) Chronic diastolic heart failure (H)  (I10) Benign essential hypertension  (N18.30) Stage 3 chronic kidney disease, unspecified whether stage 3a or 3b CKD (H)  Comment: acute/ongoing, no change  Required ICU stay for hypotension/shock post op, elevated troponin, no ischemic findings per cardiology EF of  60%  Plan: BMP follow,  No further lasix  continue coreg to 3.125mg BID  Continue entresto 24-26mg BID, jardiance 10mg QD     (D62) Anemia due to blood loss, acute  Comment: acute/ongoing, no change  Hgb 11.9 on 10/10/24, hgb 12 on 10/28/24  Plan: Hgb follow     (K59.03) Drug-induced constipation  Comment: acute/ongoing, no change  Plan: continue senna s 2 PO BID, continue miralax 17gm QD prn     (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 Greater than 50% of total time spent with counseling and coordinating care due to reviewed nursing and therapy progress notes, medications, vitals and POC. Discussed pain control and medications with patient at bedside. .   Electronically signed by: Tonya Lynn Haase, APRN CNP         Sincerely,        Tonya Lynn Haase, APRN CNP

## 2024-11-26 NOTE — PROGRESS NOTES
"[facility] GERIATRICS    Chief Complaint   Patient presents with    RECHECK     HPI:  Chris Bhakta is a 79 year old  (1945), who is being seen today for an episodic care visit at: [facility]) [25]. Today's concern is:   Left hip DJD s/p YELENA on exam today patient resting in bed, ongoing bilateral knee and hip pain reported   In therapy patient is walking 20-40 feet using a platform walker with CGA making slow progress  Total assist with cares no change  Urinary retention patient voding without difficulty  Acute CVA/cognitive impairment; BIMS 13/15 and SBT 14/28  CAD/HTN/CKD: BP  108/69, 112/69, 119/62  with HR 80 range, denies CP, palpitations, SOB   Admission weight was 175lbs and current weight is 160.8lbs  Anemia Hgb 11.2 on 11/11/24  LBM 11/13/24 per nursing notes  DMII blood sugar range 132-237    Allergies, and PMH/PSH reviewed in [facility] today.  REVIEW OF SYSTEMS:  10 point ROS of systems including Constitutional, Eyes, Respiratory, Cardiovascular, Gastroenterology, Genitourinary, Integumentary, Musculoskeletal, Psychiatric were all negative except for pertinent positives noted in my HPI.    Objective:   /69   Pulse 63   Temp 97.8  F (36.6  C)   Resp 16   Ht 1.702 m (5' 7\")   Wt 72.6 kg (160 lb)   SpO2 96%   BMI 25.06 kg/m    GENERAL APPEARANCE:  Alert, in no distress  ENT:  Mouth and posterior oropharynx normal, moist mucous membranes, [place]  EYES:  EOM, conjunctivae, lids, pupils and irises normal, PERRL  RESP:  respiratory effort and palpation of chest normal, lungs clear to auscultation , no respiratory distress  CV:  regular rate and rhythm, no murmur, rub, or gallop, peripheral edema trace+ in LE bilaterally  ABDOMEN:  normal bowel sounds, soft, nontender, no hepatosplenomegaly or other masses  M/S:   patient resting in bed  SKIN:  Inspection of skin and subcutaneous tissue baseline  NEURO:   speech wnl  PSYCH:  affect and mood normal    Recent labs in [facility] " reviewed by me today.  and Most Recent 3 CBC's:  Recent Labs   Lab Test 11/03/24  1037 11/01/24  2058 10/05/24  0719   WBC 6.6 8.5 8.6   HGB 11.4* 11.9* 13.4   MCV 99 99 100    180 169     Most Recent 3 BMP's:  Recent Labs   Lab Test 11/03/24  1037 11/02/24  2206 11/02/24  0834 11/02/24  0340 11/01/24 2058     --  136  --  132*   POTASSIUM 4.5  --  4.0  --  3.9   CHLORIDE 104  --  102  --  97*   CO2 19*  --  19*  --  17*   BUN 32.4*  --  40.5*  --  47.6*   CR 1.19*  --  1.33*  --  1.36*   ANIONGAP 15  --  15  --  18*   VIVIAN 9.4  --  9.4  --  9.7   * 204* 183*   < > 261*    < > = values in this interval not displayed.       Assessment/Plan:  (M16.12) Primary osteoarthritis of left hip  (primary encounter diagnosis)  (R53.81) Physical deconditioning  Comment: acute/ongoing s/p left YELENA 10/1/24  No change  Plan: PT and OT, tylenol 500mg TID,  dilaudid to 2mg q 6 hours prn  DC hydroxyzine  Finished Lovenox  ASA 81mg QD  F/u with ortho as directed     (N39.0) UTI associated with willis catheter  (R33.9) urinary retention  Acute/ongoing, UTI resolved, willis discontinued 11/18/24  Hospitalized 11/1-11/4/24 for hypotension and UTI  Plan: finished ceftin 500mg course, f/u with urology as directed,  continue flomax 0.4mg qd     (I63.9) acute ischemic stroke  (R41.89) cognitive impairment  Acute/ongoing, no change  BIMS 13/15 and SBT 14/28  Plan: continue PT and OT, atorvastatin 40mg qhs, ASA 81mg QD     (I25.10) Coronary artery disease involving native coronary artery of native heart without angina pectoris  (I50.32) Chronic diastolic heart failure (H)  (I10) Benign essential hypertension  (N18.30) Stage 3 chronic kidney disease, unspecified whether stage 3a or 3b CKD (H)  Comment: acute/ongoing, no change  Required ICU stay for hypotension/shock post op, elevated troponin, no ischemic findings per cardiology EF of 60%  Plan: BMP follow,  No further lasix  continue coreg to 3.125mg BID  Continue entresto  24-26mg BID, jardiance 10mg QD     (D62) Anemia due to blood loss, acute  Comment: acute/ongoing, no change  Hgb 11.9 on 10/10/24, hgb 12 on 10/28/24  Plan: Hgb follow     (K59.03) Drug-induced constipation  Comment: acute/ongoing, no change  Plan: continue senna s 2 PO BID, continue miralax 17gm QD prn     (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 Greater than 50% of total time spent with counseling and coordinating care due to reviewed nursing and therapy progress notes, medications, vitals and POC. Discussed pain control and medications with patient at bedside. .   Electronically signed by: Tonya Lynn Haase, APRN CNP

## 2024-12-02 VITALS
HEART RATE: 70 BPM | HEIGHT: 67 IN | BODY MASS INDEX: 24.96 KG/M2 | TEMPERATURE: 98 F | DIASTOLIC BLOOD PRESSURE: 65 MMHG | RESPIRATION RATE: 16 BRPM | WEIGHT: 159 LBS | OXYGEN SATURATION: 94 % | SYSTOLIC BLOOD PRESSURE: 99 MMHG

## 2024-12-02 NOTE — PROGRESS NOTES
"[facility] GERIATRICS    Chief Complaint   Patient presents with    RECHECK     HPI:  Chris Bhakta is a 79 year old  (1945), who is being seen today for an episodic care visit at: [facility]) [25]. Today's concern is:   Left hip DJD s/p YELENA on exam today patient is resting in bed, alert, pleasant, c/o ongoing bilateral hip and knee pain  In therapy patient is walking very little 10-30 feet using a 4ww with CGA, stand pivot transfers with CGA  Total assist with cares no change  Urinary retention patient voding without difficulty  Acute CVA/cognitive impairment; BIMS 13/15 and SBT 14/28  CAD/HTN/CKD: BP  95/61, 100/50, 99/65  with HR 70-80 range, denies CP, palpitations, SOB   Admission weight was 175lbs and current weight is 159.3lbs  Anemia Hgb 11.2 on 11/11/24  LBM 11/29/24 per nursing notes  DMII blood sugar range 138-237    Allergies, and PMH/PSH reviewed in [facility] today.  REVIEW OF SYSTEMS:  10 point ROS of systems including Constitutional, Eyes, Respiratory, Cardiovascular, Gastroenterology, Genitourinary, Integumentary, Musculoskeletal, Psychiatric were all negative except for pertinent positives noted in my HPI.    Objective:   BP 99/65   Pulse 70   Temp 98  F (36.7  C)   Resp 16   Ht 1.702 m (5' 7\")   Wt 72.1 kg (159 lb)   SpO2 94%   BMI 24.90 kg/m    GENERAL APPEARANCE:  Alert, in no distress  ENT:  Mouth and posterior oropharynx normal, moist mucous membranes, [place]  EYES:  EOM, conjunctivae, lids, pupils and irises normal, PERRL  RESP:  respiratory effort and palpation of chest normal, lungs clear to auscultation , no respiratory distress  CV:  regular rate and rhythm, no murmur, rub, or gallop, peripheral edema trace+ in LE bilaterally  ABDOMEN:  normal bowel sounds, soft, nontender, no hepatosplenomegaly or other masses  M/S:   patient resting in bed  SKIN:  Inspection of skin and subcutaneous tissue baseline  NEURO:   speech wnl  PSYCH:  affect and mood " normal    Recent labs in [facility] reviewed by me today.  and Most Recent 3 CBC's:  Recent Labs   Lab Test 11/03/24  1037 11/01/24  2058 10/05/24  0719   WBC 6.6 8.5 8.6   HGB 11.4* 11.9* 13.4   MCV 99 99 100    180 169     Most Recent 3 BMP's:  Recent Labs   Lab Test 11/03/24  1037 11/02/24  2206 11/02/24  0834 11/02/24  0340 11/01/24 2058     --  136  --  132*   POTASSIUM 4.5  --  4.0  --  3.9   CHLORIDE 104  --  102  --  97*   CO2 19*  --  19*  --  17*   BUN 32.4*  --  40.5*  --  47.6*   CR 1.19*  --  1.33*  --  1.36*   ANIONGAP 15  --  15  --  18*   VIVIAN 9.4  --  9.4  --  9.7   * 204* 183*   < > 261*    < > = values in this interval not displayed.       Assessment/Plan:  (M16.12) Primary osteoarthritis of left hip  (primary encounter diagnosis)  (R53.81) Physical deconditioning  Comment: acute/ongoing s/p left YELENA 10/1/24  No change  Plan: PT and OT, tylenol 500mg TID,  dilaudid to 2mg q 6 hours prn  DC hydroxyzine  Finished Lovenox  ASA 81mg QD  F/u with ortho as directed     (N39.0) UTI associated with willis catheter  (R33.9) urinary retention resolved  Acute/ongoing, UTI resolved, willis discontinued 11/18/24  Hospitalized 11/1-11/4/24 for hypotension and UTI  Plan: finished ceftin 500mg course, f/u with urology as directed,  continue flomax 0.4mg qd     (I63.9) acute ischemic stroke  (R41.89) cognitive impairment  Acute/ongoing, no change  BIMS 13/15 and SBT 14/28  Plan: continue PT and OT, atorvastatin 40mg qhs, ASA 81mg QD     (I25.10) Coronary artery disease involving native coronary artery of native heart without angina pectoris  (I50.32) Chronic diastolic heart failure (H)  (I10) Benign essential hypertension  (N18.30) Stage 3 chronic kidney disease, unspecified whether stage 3a or 3b CKD (H)  Comment: acute/ongoing, no change  Required ICU stay for hypotension/shock post op, elevated troponin, no ischemic findings per cardiology EF of 60%  Plan: BMP follow,  No further lasix  continue  coreg to 3.125mg BID  Continue entresto 24-26mg BID, jardiance 10mg QD     (D62) Anemia due to blood loss, acute  Comment: acute/ongoing, no change  Hgb 11.9 on 10/10/24, hgb 12 on 10/28/24  Plan: Hgb follow     (K59.03) Drug-induced constipation  Comment: acute/ongoing, no change  Plan: continue senna s 2 PO BID, continue miralax 17gm QD prn     (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 Greater than 50% of total time spent with counseling and coordinating care due to reviewed nursing and therapy progress notes, medications and POC, discussed advanced directives with patient at bedside and discharge planning. .   Electronically signed by: Tonya Lynn Haase, APRN CNP

## 2024-12-03 ENCOUNTER — TRANSITIONAL CARE UNIT VISIT (OUTPATIENT)
Dept: GERIATRICS | Facility: CLINIC | Age: 79
End: 2024-12-03
Payer: COMMERCIAL

## 2024-12-03 DIAGNOSIS — I63.9 ACUTE ISCHEMIC STROKE (H): ICD-10-CM

## 2024-12-03 DIAGNOSIS — K59.03 DRUG-INDUCED CONSTIPATION: ICD-10-CM

## 2024-12-03 DIAGNOSIS — R41.89 COGNITIVE IMPAIRMENT: ICD-10-CM

## 2024-12-03 DIAGNOSIS — N18.30 STAGE 3 CHRONIC KIDNEY DISEASE, UNSPECIFIED WHETHER STAGE 3A OR 3B CKD (H): ICD-10-CM

## 2024-12-03 DIAGNOSIS — D62 ANEMIA DUE TO BLOOD LOSS, ACUTE: ICD-10-CM

## 2024-12-03 DIAGNOSIS — Z71.89 ADVANCED DIRECTIVES, COUNSELING/DISCUSSION: ICD-10-CM

## 2024-12-03 DIAGNOSIS — R53.81 PHYSICAL DECONDITIONING: ICD-10-CM

## 2024-12-03 DIAGNOSIS — T83.511D URINARY TRACT INFECTION ASSOCIATED WITH INDWELLING URETHRAL CATHETER, SUBSEQUENT ENCOUNTER: ICD-10-CM

## 2024-12-03 DIAGNOSIS — I25.10 CORONARY ARTERY DISEASE INVOLVING NATIVE CORONARY ARTERY OF NATIVE HEART WITHOUT ANGINA PECTORIS: ICD-10-CM

## 2024-12-03 DIAGNOSIS — M16.12 PRIMARY OSTEOARTHRITIS OF LEFT HIP: Primary | ICD-10-CM

## 2024-12-03 DIAGNOSIS — I50.32 CHRONIC DIASTOLIC HEART FAILURE (H): ICD-10-CM

## 2024-12-03 DIAGNOSIS — N39.0 URINARY TRACT INFECTION ASSOCIATED WITH INDWELLING URETHRAL CATHETER, SUBSEQUENT ENCOUNTER: ICD-10-CM

## 2024-12-03 DIAGNOSIS — E11.29 TYPE 2 DIABETES MELLITUS WITH OTHER DIABETIC KIDNEY COMPLICATION (H): ICD-10-CM

## 2024-12-03 DIAGNOSIS — I10 BENIGN ESSENTIAL HYPERTENSION: ICD-10-CM

## 2024-12-03 PROCEDURE — 99310 SBSQ NF CARE HIGH MDM 45: CPT | Performed by: NURSE PRACTITIONER

## 2024-12-03 NOTE — LETTER
" 12/3/2024      Chris Bhakta  [address] [place] [address]        [facility] GERIATRICS    Chief Complaint   Patient presents with     RECHECK     HPI:  Chris Bhakta is a 79 year old  (1945), who is being seen today for an episodic care visit at: [facility]) [25]. Today's concern is:   Left hip DJD s/p YELENA on exam today patient is resting in bed, alert, pleasant, c/o ongoing bilateral hip and knee pain  In therapy patient is walking very little 10-30 feet using a 4ww with CGA, stand pivot transfers with CGA  Total assist with cares no change  Urinary retention patient voding without difficulty  Acute CVA/cognitive impairment; BIMS 13/15 and SBT 14/28  CAD/HTN/CKD: BP  95/61, 100/50, 99/65  with HR 70-80 range, denies CP, palpitations, SOB   Admission weight was 175lbs and current weight is 159.3lbs  Anemia Hgb 11.2 on 11/11/24  LBM 11/29/24 per nursing notes  DMII blood sugar range 138-237    Allergies, and PMH/PSH reviewed in [facility] today.  REVIEW OF SYSTEMS:  10 point ROS of systems including Constitutional, Eyes, Respiratory, Cardiovascular, Gastroenterology, Genitourinary, Integumentary, Musculoskeletal, Psychiatric were all negative except for pertinent positives noted in my HPI.    Objective:   BP 99/65   Pulse 70   Temp 98  F (36.7  C)   Resp 16   Ht 1.702 m (5' 7\")   Wt 72.1 kg (159 lb)   SpO2 94%   BMI 24.90 kg/m    GENERAL APPEARANCE:  Alert, in no distress  ENT:  Mouth and posterior oropharynx normal, moist mucous membranes, [place]  EYES:  EOM, conjunctivae, lids, pupils and irises normal, PERRL  RESP:  respiratory effort and palpation of chest normal, lungs clear to auscultation , no respiratory distress  CV:  regular rate and rhythm, no murmur, rub, or gallop, peripheral edema trace+ in LE bilaterally  ABDOMEN:  normal bowel sounds, soft, nontender, no hepatosplenomegaly or other masses  M/S:   patient resting in bed  SKIN:  Inspection of skin and " subcutaneous tissue baseline  NEURO:   speech wnl  PSYCH:  affect and mood normal    Recent labs in [facility] reviewed by me today.  and Most Recent 3 CBC's:  Recent Labs   Lab Test 11/03/24  1037 11/01/24  2058 10/05/24  0719   WBC 6.6 8.5 8.6   HGB 11.4* 11.9* 13.4   MCV 99 99 100    180 169     Most Recent 3 BMP's:  Recent Labs   Lab Test 11/03/24  1037 11/02/24  2206 11/02/24  0834 11/02/24  0340 11/01/24 2058     --  136  --  132*   POTASSIUM 4.5  --  4.0  --  3.9   CHLORIDE 104  --  102  --  97*   CO2 19*  --  19*  --  17*   BUN 32.4*  --  40.5*  --  47.6*   CR 1.19*  --  1.33*  --  1.36*   ANIONGAP 15  --  15  --  18*   VIVIAN 9.4  --  9.4  --  9.7   * 204* 183*   < > 261*    < > = values in this interval not displayed.       Assessment/Plan:  (M16.12) Primary osteoarthritis of left hip  (primary encounter diagnosis)  (R53.81) Physical deconditioning  Comment: acute/ongoing s/p left YELENA 10/1/24  No change  Plan: PT and OT, tylenol 500mg TID,  dilaudid to 2mg q 6 hours prn  DC hydroxyzine  Finished Lovenox  ASA 81mg QD  F/u with ortho as directed     (N39.0) UTI associated with willis catheter  (R33.9) urinary retention resolved  Acute/ongoing, UTI resolved, willis discontinued 11/18/24  Hospitalized 11/1-11/4/24 for hypotension and UTI  Plan: finished ceftin 500mg course, f/u with urology as directed,  continue flomax 0.4mg qd     (I63.9) acute ischemic stroke  (R41.89) cognitive impairment  Acute/ongoing, no change  BIMS 13/15 and SBT 14/28  Plan: continue PT and OT, atorvastatin 40mg qhs, ASA 81mg QD     (I25.10) Coronary artery disease involving native coronary artery of native heart without angina pectoris  (I50.32) Chronic diastolic heart failure (H)  (I10) Benign essential hypertension  (N18.30) Stage 3 chronic kidney disease, unspecified whether stage 3a or 3b CKD (H)  Comment: acute/ongoing, no change  Required ICU stay for hypotension/shock post op, elevated troponin, no ischemic findings  per cardiology EF of 60%  Plan: BMP follow,  No further lasix  continue coreg to 3.125mg BID  Continue entresto 24-26mg BID, jardiance 10mg QD     (D62) Anemia due to blood loss, acute  Comment: acute/ongoing, no change  Hgb 11.9 on 10/10/24, hgb 12 on 10/28/24  Plan: Hgb follow     (K59.03) Drug-induced constipation  Comment: acute/ongoing, no change  Plan: continue senna s 2 PO BID, continue miralax 17gm QD prn     (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 Greater than 50% of total time spent with counseling and coordinating care due to reviewed nursing and therapy progress notes, medications and POC, discussed advanced directives with patient at bedside and discharge planning. .   Electronically signed by: Tonya Lynn Haase, APRN CNP         Sincerely,        Tonya Lynn Haase, APRN CNP

## 2024-12-05 ENCOUNTER — DISCHARGE SUMMARY NURSING HOME (OUTPATIENT)
Dept: GERIATRICS | Facility: CLINIC | Age: 79
End: 2024-12-05
Payer: COMMERCIAL

## 2024-12-05 VITALS
HEIGHT: 67 IN | BODY MASS INDEX: 24.96 KG/M2 | WEIGHT: 159 LBS | HEART RATE: 87 BPM | OXYGEN SATURATION: 94 % | TEMPERATURE: 98.2 F | DIASTOLIC BLOOD PRESSURE: 58 MMHG | RESPIRATION RATE: 16 BRPM | SYSTOLIC BLOOD PRESSURE: 96 MMHG

## 2024-12-05 DIAGNOSIS — R53.81 PHYSICAL DECONDITIONING: ICD-10-CM

## 2024-12-05 DIAGNOSIS — I50.32 CHRONIC DIASTOLIC HEART FAILURE (H): ICD-10-CM

## 2024-12-05 DIAGNOSIS — I25.10 CORONARY ARTERY DISEASE INVOLVING NATIVE CORONARY ARTERY OF NATIVE HEART WITHOUT ANGINA PECTORIS: ICD-10-CM

## 2024-12-05 DIAGNOSIS — I63.9 ACUTE ISCHEMIC STROKE (H): ICD-10-CM

## 2024-12-05 DIAGNOSIS — K59.03 DRUG-INDUCED CONSTIPATION: ICD-10-CM

## 2024-12-05 DIAGNOSIS — M16.12 PRIMARY OSTEOARTHRITIS OF LEFT HIP: Primary | ICD-10-CM

## 2024-12-05 DIAGNOSIS — I10 BENIGN ESSENTIAL HYPERTENSION: ICD-10-CM

## 2024-12-05 DIAGNOSIS — R41.89 COGNITIVE IMPAIRMENT: ICD-10-CM

## 2024-12-05 DIAGNOSIS — T83.511D URINARY TRACT INFECTION ASSOCIATED WITH INDWELLING URETHRAL CATHETER, SUBSEQUENT ENCOUNTER: ICD-10-CM

## 2024-12-05 DIAGNOSIS — L89.612 PRESSURE ULCER OF RIGHT HEEL, STAGE 2 (H): ICD-10-CM

## 2024-12-05 DIAGNOSIS — E11.29 TYPE 2 DIABETES MELLITUS WITH OTHER DIABETIC KIDNEY COMPLICATION (H): ICD-10-CM

## 2024-12-05 DIAGNOSIS — N39.0 URINARY TRACT INFECTION ASSOCIATED WITH INDWELLING URETHRAL CATHETER, SUBSEQUENT ENCOUNTER: ICD-10-CM

## 2024-12-05 DIAGNOSIS — N18.30 STAGE 3 CHRONIC KIDNEY DISEASE, UNSPECIFIED WHETHER STAGE 3A OR 3B CKD (H): ICD-10-CM

## 2024-12-05 DIAGNOSIS — D62 ANEMIA DUE TO BLOOD LOSS, ACUTE: ICD-10-CM

## 2024-12-05 NOTE — LETTER
12/5/2024      Chris Bhakta  [address] [place] [address]        [facility] GERIATRICS DISCHARGE SUMMARY  PATIENT'S NAME: Chris Bhakta  YOB: 1945  MEDICAL RECORD NUMBER:  [id number]  [facility]    PRIMARY CARE PROVIDER AND CLINIC RESPONSIBLE AFTER TRANSFER:   Blaise Rodriguez, MEMO CNP, [address] [facility] / [facility] [address]    Assisted Living: [facility] [place]      Transferring providers: Tonya Lynn Haase, APRN CNP, Diogenes Lombardo MD  Recent Hospitalization/ED:  [facility] Hospital stay 10/1/24 to 10/5/24.  Date of SNF Admission: October 05, 2024  Date of SNF (anticipated) Discharge: December 5, 2004  [facility]  Cognitive Scores: BIMS: 13/15, Short blessed: 14/28, and MOCA: 13/30  Physical Function: Ambulating 10-30 ft with 4ww  DME: Wheelchair and Walker    CODE STATUS/ADVANCE DIRECTIVES DISCUSSION:  No CPR- Do NOT Intubate   ALLERGIES: Iodinated contrast media, Atorvastatin, Lisinopril, Oxycodone, and Sertraline    NURSING FACILITY COURSE   Medication Changes/Rationale:       SummaryAssessment/Plan:  (M16.12) Primary osteoarthritis of left hip  (primary encounter diagnosis)  (R53.81) Physical deconditioning  Comment: acute/ongoing s/p left YELENA 10/1/24  No change  Plan: discharge to [facility] in [place] with home PT, RN and HHA through [facility], continue tylenol 500mg TID,  dilaudid to 2mg q 6 hours prn sent #10 tabs with patient  Finished Lovenox  ASA 81mg QD  F/u with ortho as directed     (N39.0) UTI associated with willis catheter  (R33.9) urinary retention resolved  Acute/ongoing, UTI resolved, willis discontinued 11/18/24  Hospitalized 11/1-11/4/24 for hypotension and UTI  Plan: finished ceftin 500mg course, f/u with urology as directed,  continue flomax 0.4mg qd     (I63.9) acute ischemic stroke  (R41.89) cognitive  impairment  Acute/ongoing  BIMS 13/15 and SBT 14/28, MOCA 13/30  Plan: continue atorvastatin 40mg qhs, ASA 81mg QD     (I25.10) Coronary artery disease involving native coronary artery of native heart without angina pectoris  (I50.32) Chronic diastolic heart failure (H)  (I10) Benign essential hypertension  (N18.30) Stage 3 chronic kidney disease, unspecified whether stage 3a or 3b CKD (H)  Comment: acute/ongoing  Required ICU stay for hypotension/shock post op, elevated troponin, no ischemic findings per cardiology EF of 60%  Plan:  No further lasix  continue coreg to 3.125mg BID  Continue entresto 24-26mg BID, jardiance 10mg QD     (D62) Anemia due to blood loss, acute  Comment: ongoing   Hgb 11.2 on 11/11/24  Plan: f/u with PCP     (K59.03) Drug-induced constipation  Comment: ongoing  Plan: continue senna s 2 PO BID, continue miralax 17gm QD prn     (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 Patient spent most of his time in his bed. Did walk 10-30 feet using a 4ww with CGA, assist with ADL's and toileting. Patient will discharge to [facility] with home PT, RN and HHA through [facility]    Discharge Medications:  MED REC REQUIRED  Post Medication Reconciliation Status: discharge medications reconciled and changed, per note/orders       Current Outpatient Medications   Medication Sig Dispense Refill     acetaminophen (TYLENOL) 500 MG tablet Take 1,000 mg by mouth 3 times daily.       aspirin 81 MG EC tablet Take 1 tablet (81 mg) by mouth daily.       atorvastatin (LIPITOR) 40 MG tablet Take 1 tablet (40 mg) by mouth every evening.       bisacodyl  (DULCOLAX) 10 MG suppository Place 10 mg rectally daily as needed for constipation.       carvedilol (COREG) 3.125 MG tablet Take 1 tablet (3.125 mg) by mouth 2 times daily (with meals).       DULoxetine (CYMBALTA) 30 MG capsule Take 1 capsule (30 mg) by mouth every morning AND 2 capsules (60 mg) every evening.       empagliflozin (JARDIANCE) 10 MG TABS tablet Take 10 mg by mouth daily.       folic acid (FOLVITE) 1 MG tablet Take 1 mg by mouth daily.       HYDROmorphone (DILAUDID) 2 MG tablet Take 1 tablet (2 mg) by mouth every 6 hours as needed for moderate pain. 12 tablet 0     latanoprost (XALATAN) 0.005 % ophthalmic solution Place 1 drop into both eyes at bedtime.       metFORMIN (GLUCOPHAGE) 500 MG tablet Take 1 tablet (500 mg) by mouth daily (with breakfast). Diabetes mellitus       nystatin (MYCOSTATIN) 483417 UNIT/GM external powder Apply topically 2 times daily as needed for other (Rash).       ondansetron (ZOFRAN ODT) 4 MG ODT tab Take 1 tablet (4 mg) by mouth every 6 hours as needed for nausea or vomiting. 30 tablet 0     polyethylene glycol (MIRALAX) 17 g packet Take 1 packet by mouth daily as needed for constipation       senna-docusate (SENOKOT-S/PERICOLACE) 8.6-50 MG tablet Take 2 tablets by mouth 2 times daily.       tamsulosin (FLOMAX) 0.4 MG 24 hr capsule Take 0.4 mg by mouth every morning       trolamine salicylate (ASPERCREME) 10 % external cream Apply topically daily as needed for moderate pain.       vitamin D3 (CHOLECALCIFEROL) 50 mcg (2000 units) tablet Take 1 tablet by mouth daily.            Controlled medications:   not applicable/none  Medication: dilaudid 2mg tabs q 6 hours prn , #10  tabs given to patient at the time of discharge to take home     Past Medical History:   Past Medical History:   Diagnosis Date     Anxiety disorder      BPH (benign prostatic hyperplasia)      CAD (coronary artery disease)      Cardiomyopathy (H)      Cataract      Cervical cord myelomalacia (H)      CHF  "(congestive heart failure) (H)      Chronic ischemic heart disease      Chronic pain syndrome with opioid dependence      CKD (chronic kidney disease) stage 3, GFR 30-59 ml/min (H)      Closed comminuted intertrochanteric fracture of proximal end of left femur (H)      Closed fracture of neck of left femur (H)      Congenital multiple renal cysts      Cyst of right kidney      Dysarthria      Dyslipidemia      Dysphagia      Dyspnea      Exudative age-related macular degeneration of right eye with inactive choroidal neovascularization (H)      Femur fracture, left 09/2024    surgery in october     Folic acid deficiency      GERD (gastroesophageal reflux disease)      Hyperglycemia      Hyperlipidemia      Hypertension      Insomnia      Macular degeneration      MDD (major depressive disorder)      Mixed hyperlipidemia      Obesity      RAYMOND (obstructive sleep apnea)      Osteoarthritis      Osteoporosis      Pathological fracture of left femur due to age-related osteoporosis (H)      Primary osteoarthritis of right knee      PVD (peripheral vascular disease) (H)      Renal insufficiency syndrome      SAH (subarachnoid hemorrhage) (H)      Somnolence      Spinal stenosis      TBI (traumatic brain injury) (H)      Toxic encephalopathy      Type 2 diabetes mellitus (H)      Urinary incontinence      Physical Exam:   Vitals: BP 96/58   Pulse 87   Temp 98.2  F (36.8  C)   Resp 16   Ht 1.702 m (5' 7\")   Wt 72.1 kg (159 lb)   SpO2 94%   BMI 24.90 kg/m    BMI: Body mass index is 24.9 kg/m .  GENERAL APPEARANCE:  Alert, in no distress  ENT:  Mouth and posterior oropharynx normal, moist mucous membranes, [place]  EYES:  EOM, conjunctivae, lids, pupils and irises normal, PERRL  RESP:  respiratory effort and palpation of chest normal, lungs clear to auscultation , no respiratory distress  CV:  regular rate and rhythm, no murmur, rub, or gallop, no edema  ABDOMEN:  normal bowel sounds, soft, nontender, no hepatosplenomegaly " or other masses  M/S:   patient resting in bed  SKIN:  Inspection of skin and subcutaneous tissue baseline, did not visualize right heel wound  NEURO:   speech wnl  PSYCH:  affect and mood normal     SNF labs: Recent labs in [facility] reviewed by me today.  and Most Recent 3 CBC's:  Recent Labs   Lab Test 11/03/24  1037 11/01/24  2058 10/05/24  0719   WBC 6.6 8.5 8.6   HGB 11.4* 11.9* 13.4   MCV 99 99 100    180 169     Most Recent 3 BMP's:  Recent Labs   Lab Test 11/03/24  1037 11/02/24  2206 11/02/24  0834 11/02/24  0340 11/01/24 2058     --  136  --  132*   POTASSIUM 4.5  --  4.0  --  3.9   CHLORIDE 104  --  102  --  97*   CO2 19*  --  19*  --  17*   BUN 32.4*  --  40.5*  --  47.6*   CR 1.19*  --  1.33*  --  1.36*   ANIONGAP 15  --  15  --  18*   VIVIAN 9.4  --  9.4  --  9.7   * 204* 183*   < > 261*    < > = values in this interval not displayed.       DISCHARGE PLAN:  Follow up labs: No labs orders/due  Medical Follow Up:      Follow up with primary care provider in 1-2 weeks  [facility] scheduled appointments:  Appointments in Next Year      Dec 05, 2024 7:30 AM  Transitional Care Unit Visit with Tonya Lynn Haase, APRN CNP  [facility] ([facility] ) [phone] Jan 07, 2025 1:00 PM  (Arrive by 12:45 PM)  Return Stroke with Lakisha Leach PA-C  [facility] Neurology Clinics [facility] ([facility] ) [phone]           Discharge Services: Home Care:  Physical Therapy, Registered Nurse, and Home Health Aide  Discharge Instructions Verbalized to Patient at Discharge:   None    TOTAL DISCHARGE TIME:   Greater than 30 minutes  Electronically signed by:  Tonya Lynn Haase, APRN CNP     Home care Face to Face documentation done in [facility] attached to Home care orders for [facility]. , Documentation of Face to Face and Certification for Home Health Services    I certify that patient: Chris Bhakta is under my  care and that I, or a nurse practitioner or physician's assistant working with me, had a face-to-face encounter that meets the physician face-to-face encounter requirements with this patient on: 12/5/2024.    This encounter with the patient was in whole, or in part, for the following medical condition, which is the primary reason for home health care: left YELENA.    I certify that, based on my findings, the following services are medically necessary home health services: Nursing and Physical Therapy.    My clinical findings support the need for the above services because: Nurse is needed: To assess right heel wound, wound care, medication management after changes in medications or other medical regimen.. and Physical Therapy Services are needed to assess and treat the following functional impairments: strengthening and endurance, home safety.    Further, I certify that my clinical findings support that this patient is homebound (i.e. absences from home require considerable and taxing effort and are for medical reasons or [employer] services or infrequently or of short duration when for other reasons) because: Requires assistance of another person or specialized equipment to access medical services because patient: Requires supervision of another for safe transfer...    Based on the above findings. I certify that this patient is confined to the home and needs intermittent skilled nursing care, physical therapy and/or speech therapy.  The patient is under my care, and I have initiated the establishment of the plan of care.  This patient will be followed by a physician who will periodically review the plan of care.  Physician/Provider to provide follow up care: Blaise Rodriguez    Attending hospital physician (the Medicare certified [facility] provider): Tonya Lynn Haase, APRN CNP  Physician Signature: See electronic signature associated with these discharge orders.  Date: 12/5/2024              Sincerely,        Tabatha  Lynn Haase, APRN CNP

## 2024-12-05 NOTE — PROGRESS NOTES
[facility] GERIATRICS DISCHARGE SUMMARY  PATIENT'S NAME: Chris Bhakta  YOB: 1945  MEDICAL RECORD NUMBER:  [id number]  [facility]    PRIMARY CARE PROVIDER AND CLINIC RESPONSIBLE AFTER TRANSFER:   MEMO Atwood CNP, [address] [facility] [address]    Assisted Living: [facility] [place]      Transferring providers: Tonya Lynn Haase, APRN CNP, Diogenes Lombardo MD  Recent Hospitalization/ED:  [facility] Hospital stay 10/1/24 to 10/5/24.  Date of SNF Admission: October 05, 2024  Date of SNF (anticipated) Discharge: December 5, 2004  Discharged to: [facility] [place]  Cognitive Scores: BIMS: 13/15, Short blessed: 14/28, and MOCA: 13/30  Physical Function: Ambulating 10-30 ft with 4ww  DME: Wheelchair and Walker    CODE STATUS/ADVANCE DIRECTIVES DISCUSSION:  No CPR- Do NOT Intubate   ALLERGIES: Iodinated contrast media, Atorvastatin, Lisinopril, Oxycodone, and Sertraline    NURSING FACILITY COURSE   Medication Changes/Rationale:       SummaryAssessment/Plan:  (M16.12) Primary osteoarthritis of left hip  (primary encounter diagnosis)  (R53.81) Physical deconditioning  Comment: acute/ongoing s/p left YELENA 10/1/24  No change  Plan: discharge to [facility] in [place] with home PT, RN and HHA through [facility], continue tylenol 500mg TID,  dilaudid to 2mg q 6 hours prn sent #10 tabs with patient  Finished Lovenox  ASA 81mg QD  F/u with ortho as directed     (N39.0) UTI associated with willis catheter  (R33.9) urinary retention resolved  Acute/ongoing, UTI resolved, willis discontinued 11/18/24  Hospitalized 11/1-11/4/24 for hypotension and UTI  Plan: finished ceftin 500mg course, f/u with urology as directed,  continue flomax 0.4mg qd     (I63.9) acute ischemic stroke  (R41.89) cognitive impairment  Acute/ongoing  BIMS 13/15 and SBT 14/28, MOCA 13/30  Plan: continue  atorvastatin 40mg qhs, ASA 81mg QD     (I25.10) Coronary artery disease involving native coronary artery of native heart without angina pectoris  (I50.32) Chronic diastolic heart failure (H)  (I10) Benign essential hypertension  (N18.30) Stage 3 chronic kidney disease, unspecified whether stage 3a or 3b CKD (H)  Comment: acute/ongoing  Required ICU stay for hypotension/shock post op, elevated troponin, no ischemic findings per cardiology EF of 60%  Plan:  No further lasix  continue coreg to 3.125mg BID  Continue entresto 24-26mg BID, jardiance 10mg QD     (D62) Anemia due to blood loss, acute  Comment: ongoing   Hgb 11.2 on 11/11/24  Plan: f/u with PCP     (K59.03) Drug-induced constipation  Comment: ongoing  Plan: continue senna s 2 PO BID, continue miralax 17gm QD prn     (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 Patient spent most of his time in his bed. Did walk 10-30 feet using a 4ww with CGA, assist with ADL's and toileting. Patient will discharge to [facility] with home PT, RN and HHA through [facility]    Discharge Medications:  MED REC REQUIRED  Post Medication Reconciliation Status: discharge medications reconciled and changed, per note/orders       Current Outpatient Medications   Medication Sig Dispense Refill    acetaminophen (TYLENOL) 500 MG tablet Take 1,000 mg by mouth 3 times daily.      aspirin 81 MG EC tablet Take 1 tablet (81 mg) by mouth daily.      atorvastatin (LIPITOR) 40 MG tablet Take 1 tablet (40 mg) by mouth every evening.      bisacodyl (DULCOLAX) 10 MG suppository Place 10 mg rectally daily as needed for constipation.       carvedilol (COREG) 3.125 MG tablet Take 1 tablet (3.125 mg) by mouth 2 times daily (with meals).      DULoxetine (CYMBALTA) 30 MG capsule Take 1 capsule (30 mg) by mouth every morning AND 2 capsules (60 mg) every evening.      empagliflozin (JARDIANCE) 10 MG TABS tablet Take 10 mg by mouth daily.      folic acid (FOLVITE) 1 MG tablet Take 1 mg by mouth daily.      HYDROmorphone (DILAUDID) 2 MG tablet Take 1 tablet (2 mg) by mouth every 6 hours as needed for moderate pain. 12 tablet 0    latanoprost (XALATAN) 0.005 % ophthalmic solution Place 1 drop into both eyes at bedtime.      metFORMIN (GLUCOPHAGE) 500 MG tablet Take 1 tablet (500 mg) by mouth daily (with breakfast). Diabetes mellitus      nystatin (MYCOSTATIN) 851492 UNIT/GM external powder Apply topically 2 times daily as needed for other (Rash).      ondansetron (ZOFRAN ODT) 4 MG ODT tab Take 1 tablet (4 mg) by mouth every 6 hours as needed for nausea or vomiting. 30 tablet 0    polyethylene glycol (MIRALAX) 17 g packet Take 1 packet by mouth daily as needed for constipation      senna-docusate (SENOKOT-S/PERICOLACE) 8.6-50 MG tablet Take 2 tablets by mouth 2 times daily.      tamsulosin (FLOMAX) 0.4 MG 24 hr capsule Take 0.4 mg by mouth every morning      trolamine salicylate (ASPERCREME) 10 % external cream Apply topically daily as needed for moderate pain.      vitamin D3 (CHOLECALCIFEROL) 50 mcg (2000 units) tablet Take 1 tablet by mouth daily.            Controlled medications:   not applicable/none  Medication: dilaudid 2mg tabs q 6 hours prn , #10  tabs given to patient at the time of discharge to take home     Past Medical History:   Past Medical History:   Diagnosis Date    Anxiety disorder     BPH (benign prostatic hyperplasia)     CAD (coronary artery disease)     Cardiomyopathy (H)     Cataract     Cervical cord myelomalacia (H)     CHF (congestive heart failure) (H)     Chronic ischemic heart disease     Chronic pain syndrome with opioid  "dependence     CKD (chronic kidney disease) stage 3, GFR 30-59 ml/min (H)     Closed comminuted intertrochanteric fracture of proximal end of left femur (H)     Closed fracture of neck of left femur (H)     Congenital multiple renal cysts     Cyst of right kidney     Dysarthria     Dyslipidemia     Dysphagia     Dyspnea     Exudative age-related macular degeneration of right eye with inactive choroidal neovascularization (H)     Femur fracture, left 09/2024    surgery in october    Folic acid deficiency     GERD (gastroesophageal reflux disease)     Hyperglycemia     Hyperlipidemia     Hypertension     Insomnia     Macular degeneration     MDD (major depressive disorder)     Mixed hyperlipidemia     Obesity     RAYMOND (obstructive sleep apnea)     Osteoarthritis     Osteoporosis     Pathological fracture of left femur due to age-related osteoporosis (H)     Primary osteoarthritis of right knee     PVD (peripheral vascular disease) (H)     Renal insufficiency syndrome     SAH (subarachnoid hemorrhage) (H)     Somnolence     Spinal stenosis     TBI (traumatic brain injury) (H)     Toxic encephalopathy     Type 2 diabetes mellitus (H)     Urinary incontinence      Physical Exam:   Vitals: BP 96/58   Pulse 87   Temp 98.2  F (36.8  C)   Resp 16   Ht 1.702 m (5' 7\")   Wt 72.1 kg (159 lb)   SpO2 94%   BMI 24.90 kg/m    BMI: Body mass index is 24.9 kg/m .  GENERAL APPEARANCE:  Alert, in no distress  ENT:  Mouth and posterior oropharynx normal, moist mucous membranes, [place]  EYES:  EOM, conjunctivae, lids, pupils and irises normal, PERRL  RESP:  respiratory effort and palpation of chest normal, lungs clear to auscultation , no respiratory distress  CV:  regular rate and rhythm, no murmur, rub, or gallop, no edema  ABDOMEN:  normal bowel sounds, soft, nontender, no hepatosplenomegaly or other masses  M/S:   patient resting in bed  SKIN:  Inspection of skin and subcutaneous tissue baseline, did not visualize right heel " wound  NEURO:   speech wnl  PSYCH:  affect and mood normal     SNF labs: Recent labs in [facility] reviewed by me today.  and Most Recent 3 CBC's:  Recent Labs   Lab Test 11/03/24  1037 11/01/24  2058 10/05/24  0719   WBC 6.6 8.5 8.6   HGB 11.4* 11.9* 13.4   MCV 99 99 100    180 169     Most Recent 3 BMP's:  Recent Labs   Lab Test 11/03/24  1037 11/02/24  2206 11/02/24  0834 11/02/24  0340 11/01/24 2058     --  136  --  132*   POTASSIUM 4.5  --  4.0  --  3.9   CHLORIDE 104  --  102  --  97*   CO2 19*  --  19*  --  17*   BUN 32.4*  --  40.5*  --  47.6*   CR 1.19*  --  1.33*  --  1.36*   ANIONGAP 15  --  15  --  18*   VIVIAN 9.4  --  9.4  --  9.7   * 204* 183*   < > 261*    < > = values in this interval not displayed.       DISCHARGE PLAN:  Follow up labs: No labs orders/due  Medical Follow Up:      Follow up with primary care provider in 1-2 weeks  Current [place] scheduled appointments:  Appointments in Next Year      Dec 05, 2024 7:30 AM  Transitional Care Unit Visit with Tonya Lynn Haase, APRN CNP  [facility] ([facility] ) [phone] Jan 07, 2025 1:00 PM  (Arrive by 12:45 PM)  Return Stroke with Lakisha Leach PA-C  [facility] Neurology Clinics [facility] ([facility] ) [phone]           Discharge Services: Home Care:  Physical Therapy, Registered Nurse, and Home Health Aide  Discharge Instructions Verbalized to Patient at Discharge:   None    TOTAL DISCHARGE TIME:   Greater than 30 minutes  Electronically signed by:  Tonya Lynn Haase, APRN CNP     Home care Face to Face documentation done in [facility] attached to Home care orders for [facility]. , Documentation of Face to Face and Certification for Home Health Services    I certify that patient: Chris Bhakta is under my care and that I, or a nurse practitioner or physician's assistant working with me, had a face-to-face encounter that meets the physician  face-to-face encounter requirements with this patient on: 12/5/2024.    This encounter with the patient was in whole, or in part, for the following medical condition, which is the primary reason for home health care: left YELENA.    I certify that, based on my findings, the following services are medically necessary home health services: Nursing and Physical Therapy.    My clinical findings support the need for the above services because: Nurse is needed: To assess right heel wound, wound care, medication management after changes in medications or other medical regimen.. and Physical Therapy Services are needed to assess and treat the following functional impairments: strengthening and endurance, home safety.    Further, I certify that my clinical findings support that this patient is homebound (i.e. absences from home require considerable and taxing effort and are for medical reasons or [employer] services or infrequently or of short duration when for other reasons) because: Requires assistance of another person or specialized equipment to access medical services because patient: Requires supervision of another for safe transfer...    Based on the above findings. I certify that this patient is confined to the home and needs intermittent skilled nursing care, physical therapy and/or speech therapy.  The patient is under my care, and I have initiated the establishment of the plan of care.  This patient will be followed by a physician who will periodically review the plan of care.  Physician/Provider to provide follow up care: Blaise Rodriguez    Attending hospital physician (the Medicare certified [place] provider): Tonya Lynn Haase, APRN CNP  Physician Signature: See electronic signature associated with these discharge orders.  Date: 12/5/2024

## 2025-04-06 NOTE — DISCHARGE SUMMARY
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  Procedures Performed: removal of IMN and placement of revision left total hip replacement  Complications: None apparent(patient became hypotensive in the PACU and was transferred to ICU overnight)   History of Present Illness: see operative report for full HPI  Past Medical History:   Past Medical History:   Diagnosis Date    Anxiety disorder     BPH (benign prostatic hyperplasia)     CAD (coronary artery disease)     Cardiomyopathy (H)     Cataract     Cervical cord myelomalacia (H)     CHF (congestive heart failure) (H)     Chronic ischemic heart disease     Chronic pain syndrome with opioid dependence     CKD (chronic kidney disease) stage 3, GFR 30-59 ml/min (H)     Closed comminuted intertrochanteric fracture of proximal end of left femur (H)     Closed fracture of neck of left femur (H)     Congenital multiple renal cysts     Cyst of right kidney     Dysarthria     Dyslipidemia     Dysphagia     Dyspnea     Exudative age-related macular degeneration of right eye with inactive choroidal neovascularization (H)     Femur fracture, left 09/2024    surgery in october    Folic acid deficiency     GERD (gastroesophageal reflux disease)     Hyperglycemia     Hyperlipidemia     Hypertension     Insomnia     Macular degeneration     MDD (major depressive disorder)     Mixed hyperlipidemia     Obesity     RAYMOND (obstructive sleep apnea)     Osteoarthritis     Osteoporosis     Pathological fracture of left femur due to age-related osteoporosis (H)     Primary osteoarthritis of right knee     PVD (peripheral vascular disease) (H)     Renal insufficiency syndrome     SAH  (subarachnoid hemorrhage) (H)     Somnolence     Spinal stenosis     TBI (traumatic brain injury) (H)     Toxic encephalopathy     Type 2 diabetes mellitus (H)     Urinary incontinence      Patient Active Problem List   Diagnosis    Neck pain    Arthrodesis status    Acute chest pain    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    Hypotension after procedure     Past Surgical History:   Procedure Laterality Date    ARTHROPLASTY HIP Left 10/1/2024    Procedure: LEFT TOTAL HIP ARTHROPLASTY;  Surgeon: Kathryn Nicole MD;  Location: [facility] OR    CARPAL TUNNEL RELEASE      CERVICAL FUSION      CERVICAL SPINE SURGERY  2009    X2    CORONARY ARTERY BYPASS GRAFT  2003    KIDNEY CYST REMOVAL      LUMBAR DECOMPRESSION L3-S1  2010    LUMBAR SPINE SURGERY  2009    PTCA OF LAD (FOR FAILED GRAFT FAILURE)  2004    REMOVE HARDWARE HIP NAILING Left 10/1/2024    Procedure: LEFT FEMORAL INTRAMEDULARY NAIL REMOVAL;  Surgeon: Kathryn Nicole MD;  Location: [facility] OR    TOTAL HIP ARTHROPLASTY Right 2020    ULNAR TUNNEL RELEASE  2012     Social History     Socioeconomic History    Marital status:      Spouse name: Not on file    Number of children: Not on file    Years of education: Not on file    Highest education level: Not on file   Occupational History    Not on file   Tobacco Use    Smoking status: Former     Current packs/day: 0.00     Average packs/day: 0.5 packs/day for 53.9 years (26.9 ttl pk-yrs)     Types: Cigarettes     Start date: 1970     Quit date: 2024     Years since quittin.1    Smokeless tobacco: Never   Vaping Use    Vaping status: Never Used   Substance and Sexual Activity    Alcohol use: Not Currently     Comment: rare    Drug use: Not on file    Sexual activity: Not on file   Other Topics Concern    Not on file   Social History Narrative    Not on file     Social Determinants of Health     Financial Resource Strain: Not on file   Food Insecurity: No Food Insecurity  (10/6/2023)    Received from [facility]    Hunger Vital Sign     Worried About Running Out of Food in the Last Year: Never true     Ran Out of Food in the Last Year: Never true   Transportation Needs: Not on file   Physical Activity: Not on file   Stress: Not on file   Social Connections: Not on file   Interpersonal Safety: Low Risk  (10/1/2024)    Interpersonal Safety     Do you feel physically and emotionally safe where you currently live?: Yes     Within the past 12 months, have you been hit, slapped, kicked or otherwise physically hurt by someone?: No     Within the past 12 months, have you been humiliated or emotionally abused in other ways by your partner or ex-partner?: No   Housing Stability: Not on file     Medications on admission:   Medications Prior to Admission   Medication Sig Dispense Refill Last Dose    acetaminophen (TYLENOL) 500 MG tablet Take 500 mg by mouth 3 times daily   Past Week    bisacodyl (DULCOLAX) 10 MG suppository Place 10 mg rectally daily as needed for constipation.    at PRN    DULoxetine (CYMBALTA) 30 MG capsule Take 30 mg by mouth 2 times daily. 0900 & 1900    at 0900    empagliflozin (JARDIANCE) 10 MG TABS tablet Take 10 mg by mouth daily.    at 0900    folic acid (FOLVITE) 1 MG tablet Take 1 mg by mouth daily.    at 0900    furosemide (LASIX) 20 MG tablet Take 10 mg by mouth daily. (0.7o85rh=10wj)    at 0900    latanoprost (XALATAN) 0.005 % ophthalmic solution Place 1 drop into both eyes at bedtime.    at HS    metFORMIN (GLUCOPHAGE) 500 MG tablet Take 500 mg by mouth daily (with breakfast).    at 0900    morphine (MSIR) 15 MG IR tablet 1 TABLET BY MOUTH THREE TIMES DAILY AS NEEDED (Patient taking differently: Take 15 mg by mouth every morning. And 1 additional tablet as needed during same 24 hour period.) 21 tablet 0  at 0900    nystatin (MYCOSTATIN) 804421 UNIT/GM external powder Apply topically 2 times daily as needed for other (Rash).    at PRN    polyethylene glycol  (MIRALAX) 17 g packet Take 1 packet by mouth daily as needed for constipation    at 0900    sacubitril-valsartan (ENTRESTO) 24-26 MG per tablet Take 1 tablet by mouth 2 times daily. 0900 & 1900    at 0900    senna-docusate (SENOKOT-S/PERICOLACE) 8.6-50 MG tablet Take 2 tablets by mouth daily.    at PRN    tamsulosin (FLOMAX) 0.4 MG 24 hr capsule Take 0.4 mg by mouth every morning    at 0900    trolamine salicylate (ASPERCREME) 10 % external cream Apply topically as needed for moderate pain.    at PRN    vitamin D3 (CHOLECALCIFEROL) 50 mcg (2000 units) tablet Take 1 tablet by mouth daily.    at 0900    [DISCONTINUED] aspirin 81 MG EC tablet Take 81 mg by mouth daily.    at 0900     Allergies:    Allergies   Allergen Reactions    Iodinated Contrast Media Shortness Of Breath    Atorvastatin Fatigue    Lisinopril Cough    Oxycodone Confusion    Sertraline Dizziness       Hospital Course: Patient was admitted to Orthopedics and brought to the OR on where he underwent the above named procedure. Postoperatively he did very well with no apparent complications, and he had an uneventful hospital stay. Antibiotics were given for 24 hours after surgery. He was given lovenox for DVT prophylaxis and his pain was well controlled with oral meds, then transitioned to oral pain medications during his hospital stay. He progressed well with physical therapy and discharge to rehab facility was recommended. His chronic medical problems were followed by the medicine team during his hospital stay and there were no significant changes to conditions or treatment plans. No new medical problems presented during his hospital stay.   Consultations Obtained During Hospitalization:  1. Internal medicine for management of comorbid conditions and home medication management.  2. Physical Therapy for gait training, mobilization, range of motion and strengthening exercises  3. Occupational Therapy for activities of daily living.  4. Social Work for  disposition planning.  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 acetaminophen (TYLENOL) 325 MG tablet Take 2 tablets (650 mg) by mouth every 4 hours as needed for other (mild pain).  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 - Potential duplicate medications found. Please discuss with provider.        CONTINUE these medications which have NOT CHANGED    Details   !! acetaminophen (TYLENOL) 500 MG tablet Take 500 mg by mouth 3 times daily      bisacodyl (DULCOLAX) 10 MG suppository Place 10 mg rectally daily as needed for constipation.      DULoxetine (CYMBALTA) 30 MG capsule Take 30 mg by mouth 2 times daily. 0900 & 1900      empagliflozin (JARDIANCE) 10  MG TABS tablet Take 10 mg by mouth daily.      folic acid (FOLVITE) 1 MG tablet Take 1 mg by mouth daily.      furosemide (LASIX) 20 MG tablet Take 10 mg by mouth daily. (0.2p81wa=66qt)      latanoprost (XALATAN) 0.005 % ophthalmic solution Place 1 drop into both eyes at bedtime.      metFORMIN (GLUCOPHAGE) 500 MG tablet Take 500 mg by mouth daily (with breakfast).      morphine (MSIR) 15 MG IR tablet 1 TABLET BY MOUTH THREE TIMES DAILY AS NEEDED  Qty: 21 tablet, Refills: 0    Associated Diagnoses: Pain      nystatin (MYCOSTATIN) 241131 UNIT/GM external powder Apply topically 2 times daily as needed for other (Rash).      polyethylene glycol (MIRALAX) 17 g packet Take 1 packet by mouth daily as needed for constipation      sacubitril-valsartan (ENTRESTO) 24-26 MG per tablet Take 1 tablet by mouth 2 times daily. 0900 & 1900      senna-docusate (SENOKOT-S/PERICOLACE) 8.6-50 MG tablet Take 2 tablets by mouth daily.      tamsulosin (FLOMAX) 0.4 MG 24 hr capsule Take 0.4 mg by mouth every morning      trolamine salicylate (ASPERCREME) 10 % external cream Apply topically as needed for moderate pain.      vitamin D3 (CHOLECALCIFEROL) 50 mcg (2000 units) tablet Take 1 tablet by mouth daily.       !! - Potential duplicate medications found. Please discuss with provider.        STOP taking these medications       aspirin 81 MG EC tablet Comments:   Reason for Stopping:             Code Status:   X-rays needed at the follow up visit:   Les Douglas PA-C  10/3/2024 11:13 AM   RAAD Begum  [phone]   The patient is a 25y Female complaining of chest pain.

## (undated) DEVICE — PACK TOTAL HIP W/POUCH SOP15HPFSM

## (undated) DEVICE — SOL NACL 0.9% IRRIG 1000ML BOTTLE 2F7124

## (undated) DEVICE — CAST PADDING 4" UNSTERILE 9044

## (undated) DEVICE — SU DERMABOND PRINEO 22CM CLR222US

## (undated) DEVICE — MANIFOLD NEPTUNE 4 PORT 700-20

## (undated) DEVICE — ROD SYN REAMER BALL TIP 2.5X950MM  351.706S

## (undated) DEVICE — GOWN IMPERVIOUS SPECIALTY XL/XLONG 39049

## (undated) DEVICE — SOLUTION WOUND CLEANSING 3/4OZ 10% PVP EA-L3011FB-50

## (undated) DEVICE — SYR 30ML LL W/O NDL 302832

## (undated) DEVICE — Device

## (undated) DEVICE — DRSG XEROFORM 1X8"

## (undated) DEVICE — GLOVE BIOGEL PI ULTRATOUCH SZ 8.0 41180

## (undated) DEVICE — SU STRATAFIX PDS PLUS 0 CT 45CM SXPP1A406

## (undated) DEVICE — SUCTION IRR SYSTEM W/O TIP INTERPULSE HANDPIECE 0210-100-000

## (undated) DEVICE — SU VICRYL 2-0 CT-1 27" J339H

## (undated) DEVICE — SU VICRYL 0 CT-1 27" J340H

## (undated) DEVICE — SOL WATER IRRIG 1000ML BOTTLE 2F7114

## (undated) DEVICE — PAD ABD 10X8IN DERMACEA ABS NWVN 4 SL EDG SFT LF STRL 7198D

## (undated) DEVICE — PAD FOAM MCGUIRE KIT 0814-0150

## (undated) DEVICE — IMM PILLOW ABDUCT HIP MED 31143061

## (undated) DEVICE — DECANTER BAG 2002S

## (undated) DEVICE — CLOSURE SYS SKIN PREMIERPRO EXOFIN FUSION 4X22CM STRL 3472

## (undated) DEVICE — ESU ELEC BLADE 6" COATED/INSULATED E1455-6

## (undated) DEVICE — BONE WAX 2.5GM W31G

## (undated) DEVICE — DRSG AQUACEL AG HYDROFIBER 3.5X6" 422604

## (undated) DEVICE — STPL SKIN 35W 6.9MM  PXW35

## (undated) DEVICE — IRRISEPT

## (undated) DEVICE — GLOVE BIOGEL PI SZ 8.0 40880

## (undated) DEVICE — SUCTION MANIFOLD NEPTUNE SGL

## (undated) DEVICE — LINEN TOWEL PACK X5 5464

## (undated) DEVICE — GLOVE BIOGEL PI MICRO INDICATOR UNDERGLOVE SZ 8.0 48980

## (undated) DEVICE — ESU GROUND PAD UNIVERSAL W/O CORD

## (undated) DEVICE — DRAPE IOBAN INCISE 23X17" 6650EZ

## (undated) DEVICE — SU ETHIBOND 0 CTX CR  8X18" CX31D

## (undated) DEVICE — SU MONOCRYL 3-0 PS-2 27" Y427H

## (undated) DEVICE — SU VICRYL 2-0 CP-1 27" UND J266H

## (undated) DEVICE — SOL NACL 0.9% IRRIG 3000ML BAG 2B7477

## (undated) DEVICE — BLADE SAW SAGITTAL STRK 18X90X1.27MM HD SYS 6 6118-127-090

## (undated) DEVICE — NDL 19GA 1.5"

## (undated) DEVICE — CAST PADDING 4" COTTON WEBRIL UNSTERILE 9084

## (undated) DEVICE — POSITIONER ABDUCTION PILLOW FOAM MED FP-ABDUCTM

## (undated) DEVICE — PACK HIP NAILING SOP15HNSB

## (undated) DEVICE — GOWN XXLG REINFORCED 9071EL

## (undated) DEVICE — DRSG ADAPTIC 3X8" 6113

## (undated) DEVICE — DRAPE C-ARM 60X42" 1013

## (undated) DEVICE — SU ETHIBOND 2 V-37 4X30" MX69G

## (undated) DEVICE — TUBING SUCTION MEDI-VAC SOFT 3/16"X20' N520A

## (undated) RX ORDER — TRANEXAMIC ACID 650 MG/1
TABLET ORAL
Status: DISPENSED
Start: 2024-10-01

## (undated) RX ORDER — FENTANYL CITRATE-0.9 % NACL/PF 10 MCG/ML
PLASTIC BAG, INJECTION (ML) INTRAVENOUS
Status: DISPENSED
Start: 2024-10-01

## (undated) RX ORDER — FENTANYL CITRATE 50 UG/ML
INJECTION, SOLUTION INTRAMUSCULAR; INTRAVENOUS
Status: DISPENSED
Start: 2024-10-01